# Patient Record
Sex: MALE | Race: OTHER | HISPANIC OR LATINO | URBAN - METROPOLITAN AREA
[De-identification: names, ages, dates, MRNs, and addresses within clinical notes are randomized per-mention and may not be internally consistent; named-entity substitution may affect disease eponyms.]

---

## 2021-05-09 ENCOUNTER — EMERGENCY (EMERGENCY)
Facility: HOSPITAL | Age: 26
LOS: 1 days | End: 2021-05-09
Admitting: EMERGENCY MEDICINE
Payer: SELF-PAY

## 2021-05-09 PROCEDURE — 71045 X-RAY EXAM CHEST 1 VIEW: CPT | Mod: 26

## 2021-05-09 PROCEDURE — 93010 ELECTROCARDIOGRAM REPORT: CPT

## 2021-05-09 PROCEDURE — 99285 EMERGENCY DEPT VISIT HI MDM: CPT

## 2022-08-28 ENCOUNTER — EMERGENCY (EMERGENCY)
Facility: HOSPITAL | Age: 27
LOS: 1 days | Discharge: HOME | End: 2022-08-28
Admitting: EMERGENCY MEDICINE
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
## 2022-08-28 DIAGNOSIS — S01.01XA LACERATION WITHOUT FOREIGN BODY OF SCALP, INITIAL ENCOUNTER: ICD-10-CM

## 2022-08-28 DIAGNOSIS — Z04.89 ENCOUNTER FOR EXAMINATION AND OBSERVATION FOR OTHER SPECIFIED REASONS: ICD-10-CM

## 2022-08-28 DIAGNOSIS — S21.111A LACERATION WITHOUT FOREIGN BODY OF RIGHT FRONT WALL OF THORAX WITHOUT PENETRATION INTO THORACIC CAVITY, INITIAL ENCOUNTER: ICD-10-CM

## 2022-08-28 DIAGNOSIS — S11.81XA LACERATION WITHOUT FOREIGN BODY OF OTHER SPECIFIED PART OF NECK, INITIAL ENCOUNTER: ICD-10-CM
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
## 2022-08-28 DIAGNOSIS — S09.8XXA OTHER SPECIFIED INJURIES OF HEAD, INITIAL ENCOUNTER: ICD-10-CM

## 2022-08-28 PROCEDURE — 99283 EMERGENCY DEPT VISIT LOW MDM: CPT | Mod: 25

## 2022-08-28 PROCEDURE — 12002 RPR S/N/AX/GEN/TRNK2.6-7.5CM: CPT

## 2022-08-28 PROCEDURE — 70450 CT HEAD/BRAIN W/O DYE: CPT | Mod: 26

## 2022-09-01 ENCOUNTER — EMERGENCY (EMERGENCY)
Facility: HOSPITAL | Age: 27
LOS: 1 days | Discharge: HOME | End: 2022-09-01
Admitting: EMERGENCY MEDICINE

## 2022-09-01 DIAGNOSIS — S21.111D: ICD-10-CM

## 2022-09-01 DIAGNOSIS — Z48.02 ENCOUNTER FOR REMOVAL OF SUTURES: ICD-10-CM
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
## 2022-09-01 DIAGNOSIS — S01.01XD LACERATION WITHOUT FOREIGN BODY OF SCALP, SUBSEQUENT ENCOUNTER: ICD-10-CM

## 2022-09-01 PROCEDURE — L9995: CPT

## 2024-05-06 ENCOUNTER — TRANSCRIPTION ENCOUNTER (OUTPATIENT)
Age: 29
End: 2024-05-06

## 2024-05-07 ENCOUNTER — RESULT REVIEW (OUTPATIENT)
Age: 29
End: 2024-05-07

## 2024-05-07 ENCOUNTER — INPATIENT (INPATIENT)
Facility: HOSPITAL | Age: 29
LOS: 22 days | Discharge: HOME | DRG: 536 | End: 2024-05-30
Attending: STUDENT IN AN ORGANIZED HEALTH CARE EDUCATION/TRAINING PROGRAM | Admitting: STUDENT IN AN ORGANIZED HEALTH CARE EDUCATION/TRAINING PROGRAM
Payer: COMMERCIAL

## 2024-05-07 ENCOUNTER — TRANSCRIPTION ENCOUNTER (OUTPATIENT)
Age: 29
End: 2024-05-07

## 2024-05-07 VITALS
HEART RATE: 122 BPM | DIASTOLIC BLOOD PRESSURE: 60 MMHG | TEMPERATURE: 98 F | OXYGEN SATURATION: 96 % | RESPIRATION RATE: 16 BRPM | SYSTOLIC BLOOD PRESSURE: 105 MMHG

## 2024-05-07 DIAGNOSIS — S32.9XXA FRACTURE OF UNSPECIFIED PARTS OF LUMBOSACRAL SPINE AND PELVIS, INITIAL ENCOUNTER FOR CLOSED FRACTURE: ICD-10-CM

## 2024-05-07 LAB
ABO RH CONFIRMATION: SIGNIFICANT CHANGE UP
ALBUMIN SERPL ELPH-MCNC: 2.8 G/DL — LOW (ref 3.3–5.2)
ALBUMIN SERPL ELPH-MCNC: 2.8 G/DL — LOW (ref 3.3–5.2)
ALBUMIN SERPL ELPH-MCNC: 3.2 G/DL — LOW (ref 3.3–5.2)
ALBUMIN SERPL ELPH-MCNC: 3.2 G/DL — LOW (ref 3.3–5.2)
ALP SERPL-CCNC: 43 U/L — SIGNIFICANT CHANGE UP (ref 40–120)
ALP SERPL-CCNC: 49 U/L — SIGNIFICANT CHANGE UP (ref 40–120)
ALP SERPL-CCNC: 54 U/L — SIGNIFICANT CHANGE UP (ref 40–120)
ALP SERPL-CCNC: 57 U/L — SIGNIFICANT CHANGE UP (ref 40–120)
ALT FLD-CCNC: 59 U/L — HIGH
ALT FLD-CCNC: 65 U/L — HIGH
ALT FLD-CCNC: 70 U/L — HIGH
ALT FLD-CCNC: 71 U/L — HIGH
ANION GAP SERPL CALC-SCNC: 11 MMOL/L — SIGNIFICANT CHANGE UP (ref 5–17)
ANION GAP SERPL CALC-SCNC: 12 MMOL/L — SIGNIFICANT CHANGE UP (ref 5–17)
ANION GAP SERPL CALC-SCNC: 12 MMOL/L — SIGNIFICANT CHANGE UP (ref 5–17)
ANION GAP SERPL CALC-SCNC: 13 MMOL/L — SIGNIFICANT CHANGE UP (ref 5–17)
APPEARANCE UR: CLEAR — SIGNIFICANT CHANGE UP
APTT BLD: 26 SEC — SIGNIFICANT CHANGE UP (ref 24.5–35.6)
APTT BLD: 26.2 SEC — SIGNIFICANT CHANGE UP (ref 24.5–35.6)
APTT BLD: 26.9 SEC — SIGNIFICANT CHANGE UP (ref 24.5–35.6)
APTT BLD: 27.7 SEC — SIGNIFICANT CHANGE UP (ref 24.5–35.6)
AST SERPL-CCNC: 122 U/L — HIGH
AST SERPL-CCNC: 152 U/L — HIGH
AST SERPL-CCNC: 172 U/L — HIGH
AST SERPL-CCNC: 93 U/L — HIGH
BACTERIA # UR AUTO: NEGATIVE /HPF — SIGNIFICANT CHANGE UP
BASE EXCESS BLDV CALC-SCNC: -7.5 MMOL/L — LOW (ref -2–3)
BASOPHILS # BLD AUTO: 0.02 K/UL — SIGNIFICANT CHANGE UP (ref 0–0.2)
BASOPHILS # BLD AUTO: 0.03 K/UL — SIGNIFICANT CHANGE UP (ref 0–0.2)
BASOPHILS # BLD AUTO: 0.03 K/UL — SIGNIFICANT CHANGE UP (ref 0–0.2)
BASOPHILS # BLD AUTO: 0.04 K/UL — SIGNIFICANT CHANGE UP (ref 0–0.2)
BASOPHILS NFR BLD AUTO: 0.2 % — SIGNIFICANT CHANGE UP (ref 0–2)
BASOPHILS NFR BLD AUTO: 0.3 % — SIGNIFICANT CHANGE UP (ref 0–2)
BASOPHILS NFR BLD AUTO: 0.3 % — SIGNIFICANT CHANGE UP (ref 0–2)
BASOPHILS NFR BLD AUTO: 0.5 % — SIGNIFICANT CHANGE UP (ref 0–2)
BILIRUB DIRECT SERPL-MCNC: 0.1 MG/DL — SIGNIFICANT CHANGE UP (ref 0–0.3)
BILIRUB DIRECT SERPL-MCNC: 0.1 MG/DL — SIGNIFICANT CHANGE UP (ref 0–0.3)
BILIRUB INDIRECT FLD-MCNC: 0.3 MG/DL — SIGNIFICANT CHANGE UP (ref 0.2–1)
BILIRUB INDIRECT FLD-MCNC: 0.4 MG/DL — SIGNIFICANT CHANGE UP (ref 0.2–1)
BILIRUB SERPL-MCNC: 0.4 MG/DL — SIGNIFICANT CHANGE UP (ref 0.4–2)
BILIRUB SERPL-MCNC: 0.5 MG/DL — SIGNIFICANT CHANGE UP (ref 0.4–2)
BILIRUB SERPL-MCNC: 0.5 MG/DL — SIGNIFICANT CHANGE UP (ref 0.4–2)
BILIRUB SERPL-MCNC: 0.7 MG/DL — SIGNIFICANT CHANGE UP (ref 0.4–2)
BILIRUB UR-MCNC: NEGATIVE — SIGNIFICANT CHANGE UP
BLD GP AB SCN SERPL QL: SIGNIFICANT CHANGE UP
BUN SERPL-MCNC: 11.6 MG/DL — SIGNIFICANT CHANGE UP (ref 8–20)
BUN SERPL-MCNC: 13.3 MG/DL — SIGNIFICANT CHANGE UP (ref 8–20)
BUN SERPL-MCNC: 18.5 MG/DL — SIGNIFICANT CHANGE UP (ref 8–20)
BUN SERPL-MCNC: 19.2 MG/DL — SIGNIFICANT CHANGE UP (ref 8–20)
CALCIUM SERPL-MCNC: 6.3 MG/DL — CRITICAL LOW (ref 8.4–10.5)
CALCIUM SERPL-MCNC: 7.4 MG/DL — LOW (ref 8.4–10.5)
CALCIUM SERPL-MCNC: 7.8 MG/DL — LOW (ref 8.4–10.5)
CALCIUM SERPL-MCNC: 7.8 MG/DL — LOW (ref 8.4–10.5)
CAST: 1 /LPF — SIGNIFICANT CHANGE UP (ref 0–4)
CHLORIDE SERPL-SCNC: 101 MMOL/L — SIGNIFICANT CHANGE UP (ref 96–108)
CHLORIDE SERPL-SCNC: 102 MMOL/L — SIGNIFICANT CHANGE UP (ref 96–108)
CHLORIDE SERPL-SCNC: 106 MMOL/L — SIGNIFICANT CHANGE UP (ref 96–108)
CHLORIDE SERPL-SCNC: 109 MMOL/L — HIGH (ref 96–108)
CK MB CFR SERPL CALC: 35.1 NG/ML — HIGH (ref 0–6.7)
CK MB CFR SERPL CALC: 58.8 NG/ML — HIGH (ref 0–6.7)
CK MB CFR SERPL CALC: 66.3 NG/ML — HIGH (ref 0–6.7)
CK MB CFR SERPL CALC: 77.4 NG/ML — HIGH (ref 0–6.7)
CK SERPL-CCNC: 1734 U/L — HIGH (ref 30–200)
CK SERPL-CCNC: 4030 U/L — HIGH (ref 30–200)
CK SERPL-CCNC: 7146 U/L — HIGH (ref 30–200)
CK SERPL-CCNC: 8880 U/L — HIGH (ref 30–200)
CO2 SERPL-SCNC: 18 MMOL/L — LOW (ref 22–29)
CO2 SERPL-SCNC: 19 MMOL/L — LOW (ref 22–29)
CO2 SERPL-SCNC: 19 MMOL/L — LOW (ref 22–29)
CO2 SERPL-SCNC: 21 MMOL/L — LOW (ref 22–29)
COLOR SPEC: YELLOW — SIGNIFICANT CHANGE UP
CREAT SERPL-MCNC: 0.77 MG/DL — SIGNIFICANT CHANGE UP (ref 0.5–1.3)
CREAT SERPL-MCNC: 0.79 MG/DL — SIGNIFICANT CHANGE UP (ref 0.5–1.3)
CREAT SERPL-MCNC: 0.92 MG/DL — SIGNIFICANT CHANGE UP (ref 0.5–1.3)
CREAT SERPL-MCNC: 0.93 MG/DL — SIGNIFICANT CHANGE UP (ref 0.5–1.3)
DIFF PNL FLD: ABNORMAL
EGFR: 115 ML/MIN/1.73M2 — SIGNIFICANT CHANGE UP
EGFR: 116 ML/MIN/1.73M2 — SIGNIFICANT CHANGE UP
EGFR: 124 ML/MIN/1.73M2 — SIGNIFICANT CHANGE UP
EGFR: 125 ML/MIN/1.73M2 — SIGNIFICANT CHANGE UP
EOSINOPHIL # BLD AUTO: 0 K/UL — SIGNIFICANT CHANGE UP (ref 0–0.5)
EOSINOPHIL # BLD AUTO: 0 K/UL — SIGNIFICANT CHANGE UP (ref 0–0.5)
EOSINOPHIL # BLD AUTO: 0.01 K/UL — SIGNIFICANT CHANGE UP (ref 0–0.5)
EOSINOPHIL # BLD AUTO: 0.02 K/UL — SIGNIFICANT CHANGE UP (ref 0–0.5)
EOSINOPHIL NFR BLD AUTO: 0 % — SIGNIFICANT CHANGE UP (ref 0–6)
EOSINOPHIL NFR BLD AUTO: 0 % — SIGNIFICANT CHANGE UP (ref 0–6)
EOSINOPHIL NFR BLD AUTO: 0.1 % — SIGNIFICANT CHANGE UP (ref 0–6)
EOSINOPHIL NFR BLD AUTO: 0.1 % — SIGNIFICANT CHANGE UP (ref 0–6)
ETHANOL SERPL-MCNC: <10 MG/DL — SIGNIFICANT CHANGE UP (ref 0–9)
FIBRINOGEN PPP-MCNC: 152 MG/DL — LOW (ref 200–450)
FIBRINOGEN PPP-MCNC: 387 MG/DL — SIGNIFICANT CHANGE UP (ref 200–450)
GAS PNL BLDV: SIGNIFICANT CHANGE UP
GLUCOSE BLDC GLUCOMTR-MCNC: 138 MG/DL — HIGH (ref 70–99)
GLUCOSE BLDC GLUCOMTR-MCNC: 173 MG/DL — HIGH (ref 70–99)
GLUCOSE SERPL-MCNC: 131 MG/DL — HIGH (ref 70–99)
GLUCOSE SERPL-MCNC: 158 MG/DL — HIGH (ref 70–99)
GLUCOSE SERPL-MCNC: 159 MG/DL — HIGH (ref 70–99)
GLUCOSE SERPL-MCNC: 170 MG/DL — HIGH (ref 70–99)
GLUCOSE UR QL: NEGATIVE MG/DL — SIGNIFICANT CHANGE UP
HCO3 BLDV-SCNC: 21 MMOL/L — LOW (ref 22–29)
HCT VFR BLD CALC: 22.8 % — LOW (ref 39–50)
HCT VFR BLD CALC: 27.8 % — LOW (ref 39–50)
HCT VFR BLD CALC: 35.3 % — LOW (ref 39–50)
HCT VFR BLD CALC: 35.9 % — LOW (ref 39–50)
HGB BLD-MCNC: 10 G/DL — LOW (ref 13–17)
HGB BLD-MCNC: 12.2 G/DL — LOW (ref 13–17)
HGB BLD-MCNC: 12.3 G/DL — LOW (ref 13–17)
HGB BLD-MCNC: 8.2 G/DL — LOW (ref 13–17)
IMM GRANULOCYTES NFR BLD AUTO: 0.4 % — SIGNIFICANT CHANGE UP (ref 0–0.9)
IMM GRANULOCYTES NFR BLD AUTO: 0.5 % — SIGNIFICANT CHANGE UP (ref 0–0.9)
IMM GRANULOCYTES NFR BLD AUTO: 0.5 % — SIGNIFICANT CHANGE UP (ref 0–0.9)
IMM GRANULOCYTES NFR BLD AUTO: 1.1 % — HIGH (ref 0–0.9)
INR BLD: 1.16 RATIO — SIGNIFICANT CHANGE UP (ref 0.85–1.18)
INR BLD: 1.18 RATIO — SIGNIFICANT CHANGE UP (ref 0.85–1.18)
INR BLD: 1.2 RATIO — HIGH (ref 0.85–1.18)
INR BLD: 1.22 RATIO — HIGH (ref 0.85–1.18)
KETONES UR-MCNC: NEGATIVE MG/DL — SIGNIFICANT CHANGE UP
LACTATE SERPL-SCNC: 2.5 MMOL/L — HIGH (ref 0.5–2)
LEUKOCYTE ESTERASE UR-ACNC: NEGATIVE — SIGNIFICANT CHANGE UP
LIDOCAIN IGE QN: 39 U/L — SIGNIFICANT CHANGE UP (ref 22–51)
LYMPHOCYTES # BLD AUTO: 0.71 K/UL — LOW (ref 1–3.3)
LYMPHOCYTES # BLD AUTO: 0.93 K/UL — LOW (ref 1–3.3)
LYMPHOCYTES # BLD AUTO: 1.51 K/UL — SIGNIFICANT CHANGE UP (ref 1–3.3)
LYMPHOCYTES # BLD AUTO: 10.1 % — LOW (ref 13–44)
LYMPHOCYTES # BLD AUTO: 10.2 % — LOW (ref 13–44)
LYMPHOCYTES # BLD AUTO: 16.1 % — SIGNIFICANT CHANGE UP (ref 13–44)
LYMPHOCYTES # BLD AUTO: 16.7 % — SIGNIFICANT CHANGE UP (ref 13–44)
LYMPHOCYTES # BLD AUTO: 2.44 K/UL — SIGNIFICANT CHANGE UP (ref 1–3.3)
MAGNESIUM SERPL-MCNC: 1.4 MG/DL — LOW (ref 1.6–2.6)
MAGNESIUM SERPL-MCNC: 1.4 MG/DL — LOW (ref 1.8–2.6)
MAGNESIUM SERPL-MCNC: 1.8 MG/DL — SIGNIFICANT CHANGE UP (ref 1.6–2.6)
MCHC RBC-ENTMCNC: 30.6 PG — SIGNIFICANT CHANGE UP (ref 27–34)
MCHC RBC-ENTMCNC: 30.7 PG — SIGNIFICANT CHANGE UP (ref 27–34)
MCHC RBC-ENTMCNC: 31 PG — SIGNIFICANT CHANGE UP (ref 27–34)
MCHC RBC-ENTMCNC: 31.4 PG — SIGNIFICANT CHANGE UP (ref 27–34)
MCHC RBC-ENTMCNC: 34 GM/DL — SIGNIFICANT CHANGE UP (ref 32–36)
MCHC RBC-ENTMCNC: 34.8 GM/DL — SIGNIFICANT CHANGE UP (ref 32–36)
MCHC RBC-ENTMCNC: 36 GM/DL — SIGNIFICANT CHANGE UP (ref 32–36)
MCHC RBC-ENTMCNC: 36 GM/DL — SIGNIFICANT CHANGE UP (ref 32–36)
MCV RBC AUTO: 86.1 FL — SIGNIFICANT CHANGE UP (ref 80–100)
MCV RBC AUTO: 87.4 FL — SIGNIFICANT CHANGE UP (ref 80–100)
MCV RBC AUTO: 87.8 FL — SIGNIFICANT CHANGE UP (ref 80–100)
MCV RBC AUTO: 90.2 FL — SIGNIFICANT CHANGE UP (ref 80–100)
MONOCYTES # BLD AUTO: 0.44 K/UL — SIGNIFICANT CHANGE UP (ref 0–0.9)
MONOCYTES # BLD AUTO: 0.53 K/UL — SIGNIFICANT CHANGE UP (ref 0–0.9)
MONOCYTES # BLD AUTO: 1.12 K/UL — HIGH (ref 0–0.9)
MONOCYTES # BLD AUTO: 1.47 K/UL — HIGH (ref 0–0.9)
MONOCYTES NFR BLD AUTO: 7.5 % — SIGNIFICANT CHANGE UP (ref 2–14)
MONOCYTES NFR BLD AUTO: 7.6 % — SIGNIFICANT CHANGE UP (ref 2–14)
MONOCYTES NFR BLD AUTO: 7.6 % — SIGNIFICANT CHANGE UP (ref 2–14)
MONOCYTES NFR BLD AUTO: 9.9 % — SIGNIFICANT CHANGE UP (ref 2–14)
MRSA PCR RESULT.: SIGNIFICANT CHANGE UP
NEUTROPHILS # BLD AUTO: 10.88 K/UL — HIGH (ref 1.8–7.4)
NEUTROPHILS # BLD AUTO: 11.71 K/UL — HIGH (ref 1.8–7.4)
NEUTROPHILS # BLD AUTO: 4.36 K/UL — SIGNIFICANT CHANGE UP (ref 1.8–7.4)
NEUTROPHILS # BLD AUTO: 5.76 K/UL — SIGNIFICANT CHANGE UP (ref 1.8–7.4)
NEUTROPHILS NFR BLD AUTO: 74.3 % — SIGNIFICANT CHANGE UP (ref 43–77)
NEUTROPHILS NFR BLD AUTO: 75.3 % — SIGNIFICANT CHANGE UP (ref 43–77)
NEUTROPHILS NFR BLD AUTO: 79 % — HIGH (ref 43–77)
NEUTROPHILS NFR BLD AUTO: 81.7 % — HIGH (ref 43–77)
NITRITE UR-MCNC: NEGATIVE — SIGNIFICANT CHANGE UP
PCO2 BLDV: 56 MMHG — HIGH (ref 42–55)
PH BLDV: 7.18 — CRITICAL LOW (ref 7.32–7.43)
PH UR: 6 — SIGNIFICANT CHANGE UP (ref 5–8)
PHOSPHATE SERPL-MCNC: 2.4 MG/DL — SIGNIFICANT CHANGE UP (ref 2.4–4.7)
PHOSPHATE SERPL-MCNC: 3.4 MG/DL — SIGNIFICANT CHANGE UP (ref 2.4–4.7)
PHOSPHATE SERPL-MCNC: 3.4 MG/DL — SIGNIFICANT CHANGE UP (ref 2.4–4.7)
PLATELET # BLD AUTO: 122 K/UL — LOW (ref 150–400)
PLATELET # BLD AUTO: 145 K/UL — LOW (ref 150–400)
PLATELET # BLD AUTO: 190 K/UL — SIGNIFICANT CHANGE UP (ref 150–400)
PLATELET # BLD AUTO: 202 K/UL — SIGNIFICANT CHANGE UP (ref 150–400)
PO2 BLDV: 58 MMHG — HIGH (ref 25–45)
POTASSIUM SERPL-MCNC: 2.9 MMOL/L — CRITICAL LOW (ref 3.5–5.3)
POTASSIUM SERPL-MCNC: 3.9 MMOL/L — SIGNIFICANT CHANGE UP (ref 3.5–5.3)
POTASSIUM SERPL-MCNC: 4.1 MMOL/L — SIGNIFICANT CHANGE UP (ref 3.5–5.3)
POTASSIUM SERPL-MCNC: 4.4 MMOL/L — SIGNIFICANT CHANGE UP (ref 3.5–5.3)
POTASSIUM SERPL-SCNC: 2.9 MMOL/L — CRITICAL LOW (ref 3.5–5.3)
POTASSIUM SERPL-SCNC: 3.9 MMOL/L — SIGNIFICANT CHANGE UP (ref 3.5–5.3)
POTASSIUM SERPL-SCNC: 4.1 MMOL/L — SIGNIFICANT CHANGE UP (ref 3.5–5.3)
POTASSIUM SERPL-SCNC: 4.4 MMOL/L — SIGNIFICANT CHANGE UP (ref 3.5–5.3)
PROT SERPL-MCNC: 4.2 G/DL — LOW (ref 6.6–8.7)
PROT SERPL-MCNC: 4.7 G/DL — LOW (ref 6.6–8.7)
PROT SERPL-MCNC: 4.9 G/DL — LOW (ref 6.6–8.7)
PROT SERPL-MCNC: 5.2 G/DL — LOW (ref 6.6–8.7)
PROT UR-MCNC: NEGATIVE MG/DL — SIGNIFICANT CHANGE UP
PROTHROM AB SERPL-ACNC: 12.8 SEC — SIGNIFICANT CHANGE UP (ref 9.5–13)
PROTHROM AB SERPL-ACNC: 13 SEC — SIGNIFICANT CHANGE UP (ref 9.5–13)
PROTHROM AB SERPL-ACNC: 13.2 SEC — HIGH (ref 9.5–13)
PROTHROM AB SERPL-ACNC: 13.5 SEC — HIGH (ref 9.5–13)
RBC # BLD: 2.61 M/UL — LOW (ref 4.2–5.8)
RBC # BLD: 3.23 M/UL — LOW (ref 4.2–5.8)
RBC # BLD: 3.98 M/UL — LOW (ref 4.2–5.8)
RBC # BLD: 4.02 M/UL — LOW (ref 4.2–5.8)
RBC # FLD: 12.8 % — SIGNIFICANT CHANGE UP (ref 10.3–14.5)
RBC # FLD: 13 % — SIGNIFICANT CHANGE UP (ref 10.3–14.5)
RBC # FLD: 13.6 % — SIGNIFICANT CHANGE UP (ref 10.3–14.5)
RBC # FLD: 14 % — SIGNIFICANT CHANGE UP (ref 10.3–14.5)
RBC CASTS # UR COMP ASSIST: 8 /HPF — HIGH (ref 0–4)
S AUREUS DNA NOSE QL NAA+PROBE: SIGNIFICANT CHANGE UP
SAO2 % BLDV: 88 % — SIGNIFICANT CHANGE UP
SODIUM SERPL-SCNC: 133 MMOL/L — LOW (ref 135–145)
SODIUM SERPL-SCNC: 133 MMOL/L — LOW (ref 135–145)
SODIUM SERPL-SCNC: 137 MMOL/L — SIGNIFICANT CHANGE UP (ref 135–145)
SODIUM SERPL-SCNC: 139 MMOL/L — SIGNIFICANT CHANGE UP (ref 135–145)
SP GR SPEC: >1.03 — HIGH (ref 1–1.03)
SQUAMOUS # UR AUTO: 0 /HPF — SIGNIFICANT CHANGE UP (ref 0–5)
UROBILINOGEN FLD QL: 0.2 MG/DL — SIGNIFICANT CHANGE UP (ref 0.2–1)
WBC # BLD: 14.65 K/UL — HIGH (ref 3.8–10.5)
WBC # BLD: 14.82 K/UL — HIGH (ref 3.8–10.5)
WBC # BLD: 5.79 K/UL — SIGNIFICANT CHANGE UP (ref 3.8–10.5)
WBC # BLD: 7.05 K/UL — SIGNIFICANT CHANGE UP (ref 3.8–10.5)
WBC # FLD AUTO: 14.65 K/UL — HIGH (ref 3.8–10.5)
WBC # FLD AUTO: 14.82 K/UL — HIGH (ref 3.8–10.5)
WBC # FLD AUTO: 5.79 K/UL — SIGNIFICANT CHANGE UP (ref 3.8–10.5)
WBC # FLD AUTO: 7.05 K/UL — SIGNIFICANT CHANGE UP (ref 3.8–10.5)
WBC UR QL: 1 /HPF — SIGNIFICANT CHANGE UP (ref 0–5)

## 2024-05-07 PROCEDURE — 72125 CT NECK SPINE W/O DYE: CPT | Mod: 26

## 2024-05-07 PROCEDURE — 72192 CT PELVIS W/O DYE: CPT | Mod: 26

## 2024-05-07 PROCEDURE — 76937 US GUIDE VASCULAR ACCESS: CPT | Mod: 26

## 2024-05-07 PROCEDURE — 70498 CT ANGIOGRAPHY NECK: CPT | Mod: 26

## 2024-05-07 PROCEDURE — 36247 INS CATH ABD/L-EXT ART 3RD: CPT

## 2024-05-07 PROCEDURE — 73560 X-RAY EXAM OF KNEE 1 OR 2: CPT | Mod: 26,50

## 2024-05-07 PROCEDURE — 99254 IP/OBS CNSLTJ NEW/EST MOD 60: CPT | Mod: 57,25

## 2024-05-07 PROCEDURE — 72128 CT CHEST SPINE W/O DYE: CPT | Mod: 26

## 2024-05-07 PROCEDURE — 73070 X-RAY EXAM OF ELBOW: CPT | Mod: 26,LT

## 2024-05-07 PROCEDURE — 72170 X-RAY EXAM OF PELVIS: CPT | Mod: 26

## 2024-05-07 PROCEDURE — 99223 1ST HOSP IP/OBS HIGH 75: CPT | Mod: 57

## 2024-05-07 PROCEDURE — 70450 CT HEAD/BRAIN W/O DYE: CPT | Mod: 26,59

## 2024-05-07 PROCEDURE — 71045 X-RAY EXAM CHEST 1 VIEW: CPT | Mod: 26

## 2024-05-07 PROCEDURE — 73090 X-RAY EXAM OF FOREARM: CPT | Mod: 26,LT

## 2024-05-07 PROCEDURE — 26670 TREAT HAND DISLOCATION: CPT | Mod: F4

## 2024-05-07 PROCEDURE — 36246 INS CATH ABD/L-EXT ART 2ND: CPT | Mod: 59

## 2024-05-07 PROCEDURE — 27220 TREAT HIP SOCKET FRACTURE: CPT | Mod: 50

## 2024-05-07 PROCEDURE — 73600 X-RAY EXAM OF ANKLE: CPT | Mod: 26,50

## 2024-05-07 PROCEDURE — 99285 EMERGENCY DEPT VISIT HI MDM: CPT

## 2024-05-07 PROCEDURE — 76870 US EXAM SCROTUM: CPT | Mod: 26

## 2024-05-07 PROCEDURE — 73200 CT UPPER EXTREMITY W/O DYE: CPT | Mod: 26,LT

## 2024-05-07 PROCEDURE — 88305 TISSUE EXAM BY PATHOLOGIST: CPT | Mod: 26

## 2024-05-07 PROCEDURE — 70496 CT ANGIOGRAPHY HEAD: CPT | Mod: 26

## 2024-05-07 PROCEDURE — 73130 X-RAY EXAM OF HAND: CPT | Mod: 26,LT

## 2024-05-07 PROCEDURE — 27216 TREAT PELVIC RING FRACTURE: CPT | Mod: LT

## 2024-05-07 PROCEDURE — 70450 CT HEAD/BRAIN W/O DYE: CPT | Mod: 26,59,76

## 2024-05-07 PROCEDURE — 93010 ELECTROCARDIOGRAM REPORT: CPT

## 2024-05-07 PROCEDURE — 73030 X-RAY EXAM OF SHOULDER: CPT | Mod: 26,RT,76

## 2024-05-07 PROCEDURE — 72131 CT LUMBAR SPINE W/O DYE: CPT | Mod: 26

## 2024-05-07 PROCEDURE — 54520 REMOVAL OF TESTIS: CPT | Mod: LT

## 2024-05-07 PROCEDURE — 37244 VASC EMBOLIZE/OCCLUDE BLEED: CPT

## 2024-05-07 PROCEDURE — 27217 TREAT PELVIC RING FRACTURE: CPT

## 2024-05-07 DEVICE — SCREW CORT S-T 3.5X40MM: Type: IMPLANTABLE DEVICE | Site: LEFT | Status: FUNCTIONAL

## 2024-05-07 DEVICE — IMPLANTABLE DEVICE: Type: IMPLANTABLE DEVICE | Site: LEFT | Status: FUNCTIONAL

## 2024-05-07 DEVICE — SCREW CORT S-T 3.5X34MM: Type: IMPLANTABLE DEVICE | Site: LEFT | Status: FUNCTIONAL

## 2024-05-07 DEVICE — SCREW CORT S-T 3.5X38MM: Type: IMPLANTABLE DEVICE | Site: LEFT | Status: FUNCTIONAL

## 2024-05-07 DEVICE — GUIDE WIRE 2.8 150MM CALIBRATION: Type: IMPLANTABLE DEVICE | Site: LEFT | Status: FUNCTIONAL

## 2024-05-07 DEVICE — WASHER LG SCRW 13MM: Type: IMPLANTABLE DEVICE | Site: LEFT | Status: FUNCTIONAL

## 2024-05-07 DEVICE — BONE WAX 2.5GM: Type: IMPLANTABLE DEVICE | Site: LEFT | Status: FUNCTIONAL

## 2024-05-07 DEVICE — SCREW CORT S-T 3.5X30MM: Type: IMPLANTABLE DEVICE | Site: LEFT | Status: FUNCTIONAL

## 2024-05-07 RX ORDER — LIDOCAINE 4 G/100G
2 CREAM TOPICAL EVERY 24 HOURS
Refills: 0 | Status: DISCONTINUED | OUTPATIENT
Start: 2024-05-07 | End: 2024-05-14

## 2024-05-07 RX ORDER — CHLORHEXIDINE GLUCONATE 213 G/1000ML
1 SOLUTION TOPICAL DAILY
Refills: 0 | Status: DISCONTINUED | OUTPATIENT
Start: 2024-05-07 | End: 2024-05-07

## 2024-05-07 RX ORDER — LIDOCAINE HCL 20 MG/ML
40 VIAL (ML) INJECTION ONCE
Refills: 0 | Status: DISCONTINUED | OUTPATIENT
Start: 2024-05-07 | End: 2024-05-07

## 2024-05-07 RX ORDER — CEFAZOLIN SODIUM 1 G
2000 VIAL (EA) INJECTION ONCE
Refills: 0 | Status: DISCONTINUED | OUTPATIENT
Start: 2024-05-07 | End: 2024-05-07

## 2024-05-07 RX ORDER — MAGNESIUM SULFATE 500 MG/ML
2 VIAL (ML) INJECTION
Refills: 0 | Status: COMPLETED | OUTPATIENT
Start: 2024-05-07 | End: 2024-05-07

## 2024-05-07 RX ORDER — POTASSIUM CHLORIDE 20 MEQ
10 PACKET (EA) ORAL
Refills: 0 | Status: COMPLETED | OUTPATIENT
Start: 2024-05-07 | End: 2024-05-07

## 2024-05-07 RX ORDER — CEFAZOLIN SODIUM 1 G
2000 VIAL (EA) INJECTION
Refills: 0 | Status: COMPLETED | OUTPATIENT
Start: 2024-05-07 | End: 2024-05-08

## 2024-05-07 RX ORDER — FENTANYL CITRATE 50 UG/ML
50 INJECTION INTRAVENOUS ONCE
Refills: 0 | Status: DISCONTINUED | OUTPATIENT
Start: 2024-05-07 | End: 2024-05-07

## 2024-05-07 RX ORDER — LIDOCAINE 4 G/100G
1 CREAM TOPICAL DAILY
Refills: 0 | Status: DISCONTINUED | OUTPATIENT
Start: 2024-05-07 | End: 2024-05-07

## 2024-05-07 RX ORDER — SENNA PLUS 8.6 MG/1
2 TABLET ORAL AT BEDTIME
Refills: 0 | Status: DISCONTINUED | OUTPATIENT
Start: 2024-05-07 | End: 2024-05-14

## 2024-05-07 RX ORDER — CHLORHEXIDINE GLUCONATE 213 G/1000ML
1 SOLUTION TOPICAL DAILY
Refills: 0 | Status: DISCONTINUED | OUTPATIENT
Start: 2024-05-07 | End: 2024-05-10

## 2024-05-07 RX ORDER — FENTANYL CITRATE 50 UG/ML
100 INJECTION INTRAVENOUS ONCE
Refills: 0 | Status: DISCONTINUED | OUTPATIENT
Start: 2024-05-07 | End: 2024-05-07

## 2024-05-07 RX ORDER — FENTANYL CITRATE 50 UG/ML
50 INJECTION INTRAVENOUS EVERY 4 HOURS
Refills: 0 | Status: DISCONTINUED | OUTPATIENT
Start: 2024-05-07 | End: 2024-05-07

## 2024-05-07 RX ORDER — POVIDONE-IODINE 5 %
1 AEROSOL (ML) TOPICAL ONCE
Refills: 0 | Status: DISCONTINUED | OUTPATIENT
Start: 2024-05-07 | End: 2024-05-07

## 2024-05-07 RX ORDER — CHLORHEXIDINE GLUCONATE 213 G/1000ML
1 SOLUTION TOPICAL
Refills: 0 | Status: DISCONTINUED | OUTPATIENT
Start: 2024-05-07 | End: 2024-05-07

## 2024-05-07 RX ORDER — SODIUM CHLORIDE 9 MG/ML
1000 INJECTION, SOLUTION INTRAVENOUS
Refills: 0 | Status: DISCONTINUED | OUTPATIENT
Start: 2024-05-07 | End: 2024-05-09

## 2024-05-07 RX ORDER — SODIUM CHLORIDE 9 MG/ML
1000 INJECTION, SOLUTION INTRAVENOUS ONCE
Refills: 0 | Status: COMPLETED | OUTPATIENT
Start: 2024-05-07 | End: 2024-05-07

## 2024-05-07 RX ORDER — ONDANSETRON 8 MG/1
4 TABLET, FILM COATED ORAL ONCE
Refills: 0 | Status: COMPLETED | OUTPATIENT
Start: 2024-05-07 | End: 2024-05-07

## 2024-05-07 RX ORDER — HYDROMORPHONE HYDROCHLORIDE 2 MG/ML
0.2 INJECTION INTRAMUSCULAR; INTRAVENOUS; SUBCUTANEOUS EVERY 4 HOURS
Refills: 0 | Status: DISCONTINUED | OUTPATIENT
Start: 2024-05-07 | End: 2024-05-07

## 2024-05-07 RX ORDER — ACETAMINOPHEN 500 MG
1000 TABLET ORAL ONCE
Refills: 0 | Status: COMPLETED | OUTPATIENT
Start: 2024-05-07 | End: 2024-05-07

## 2024-05-07 RX ORDER — CEFAZOLIN SODIUM 1 G
2000 VIAL (EA) INJECTION ONCE
Refills: 0 | Status: COMPLETED | OUTPATIENT
Start: 2024-05-07 | End: 2024-05-07

## 2024-05-07 RX ORDER — LEVETIRACETAM 250 MG/1
500 TABLET, FILM COATED ORAL EVERY 12 HOURS
Refills: 0 | Status: DISCONTINUED | OUTPATIENT
Start: 2024-05-07 | End: 2024-05-07

## 2024-05-07 RX ORDER — SODIUM CHLORIDE 9 MG/ML
1000 INJECTION, SOLUTION INTRAVENOUS
Refills: 0 | Status: DISCONTINUED | OUTPATIENT
Start: 2024-05-07 | End: 2024-05-07

## 2024-05-07 RX ORDER — OXYCODONE HYDROCHLORIDE 5 MG/1
5 TABLET ORAL EVERY 4 HOURS
Refills: 0 | Status: DISCONTINUED | OUTPATIENT
Start: 2024-05-07 | End: 2024-05-08

## 2024-05-07 RX ORDER — ENOXAPARIN SODIUM 100 MG/ML
40 INJECTION SUBCUTANEOUS EVERY 12 HOURS
Refills: 0 | Status: DISCONTINUED | OUTPATIENT
Start: 2024-05-07 | End: 2024-05-08

## 2024-05-07 RX ORDER — CALCIUM GLUCONATE 100 MG/ML
2 VIAL (ML) INTRAVENOUS
Refills: 0 | Status: COMPLETED | OUTPATIENT
Start: 2024-05-07 | End: 2024-05-07

## 2024-05-07 RX ORDER — MUPIROCIN 20 MG/G
1 OINTMENT TOPICAL
Refills: 0 | Status: DISCONTINUED | OUTPATIENT
Start: 2024-05-07 | End: 2024-05-07

## 2024-05-07 RX ORDER — ACETAMINOPHEN 500 MG
650 TABLET ORAL EVERY 6 HOURS
Refills: 0 | Status: DISCONTINUED | OUTPATIENT
Start: 2024-05-07 | End: 2024-05-14

## 2024-05-07 RX ORDER — SODIUM CHLORIDE 9 MG/ML
250 INJECTION INTRAMUSCULAR; INTRAVENOUS; SUBCUTANEOUS ONCE
Refills: 0 | Status: DISCONTINUED | OUTPATIENT
Start: 2024-05-07 | End: 2024-05-07

## 2024-05-07 RX ORDER — TETANUS TOXOID, REDUCED DIPHTHERIA TOXOID AND ACELLULAR PERTUSSIS VACCINE, ADSORBED 5; 2.5; 8; 8; 2.5 [IU]/.5ML; [IU]/.5ML; UG/.5ML; UG/.5ML; UG/.5ML
0.5 SUSPENSION INTRAMUSCULAR ONCE
Refills: 0 | Status: COMPLETED | OUTPATIENT
Start: 2024-05-07 | End: 2024-05-07

## 2024-05-07 RX ORDER — VANCOMYCIN HCL 1 G
1000 VIAL (EA) INTRAVENOUS ONCE
Refills: 0 | Status: DISCONTINUED | OUTPATIENT
Start: 2024-05-07 | End: 2024-05-07

## 2024-05-07 RX ORDER — HYDROMORPHONE HYDROCHLORIDE 2 MG/ML
0.5 INJECTION INTRAMUSCULAR; INTRAVENOUS; SUBCUTANEOUS EVERY 4 HOURS
Refills: 0 | Status: DISCONTINUED | OUTPATIENT
Start: 2024-05-07 | End: 2024-05-07

## 2024-05-07 RX ORDER — OXYCODONE HYDROCHLORIDE 5 MG/1
10 TABLET ORAL EVERY 4 HOURS
Refills: 0 | Status: DISCONTINUED | OUTPATIENT
Start: 2024-05-07 | End: 2024-05-08

## 2024-05-07 RX ADMIN — Medication 25 GRAM(S): at 13:13

## 2024-05-07 RX ADMIN — TETANUS TOXOID, REDUCED DIPHTHERIA TOXOID AND ACELLULAR PERTUSSIS VACCINE, ADSORBED 0.5 MILLILITER(S): 5; 2.5; 8; 8; 2.5 SUSPENSION INTRAMUSCULAR at 02:28

## 2024-05-07 RX ADMIN — Medication 25 GRAM(S): at 05:03

## 2024-05-07 RX ADMIN — HYDROMORPHONE HYDROCHLORIDE 0.5 MILLIGRAM(S): 2 INJECTION INTRAMUSCULAR; INTRAVENOUS; SUBCUTANEOUS at 13:13

## 2024-05-07 RX ADMIN — SODIUM CHLORIDE 120 MILLILITER(S): 9 INJECTION, SOLUTION INTRAVENOUS at 02:28

## 2024-05-07 RX ADMIN — FENTANYL CITRATE 50 MICROGRAM(S): 50 INJECTION INTRAVENOUS at 03:15

## 2024-05-07 RX ADMIN — HYDROMORPHONE HYDROCHLORIDE 0.5 MILLIGRAM(S): 2 INJECTION INTRAMUSCULAR; INTRAVENOUS; SUBCUTANEOUS at 13:30

## 2024-05-07 RX ADMIN — FENTANYL CITRATE 100 MICROGRAM(S): 50 INJECTION INTRAVENOUS at 04:38

## 2024-05-07 RX ADMIN — Medication 100 MILLIEQUIVALENT(S): at 04:21

## 2024-05-07 RX ADMIN — CHLORHEXIDINE GLUCONATE 1 APPLICATION(S): 213 SOLUTION TOPICAL at 05:04

## 2024-05-07 RX ADMIN — Medication 200 GRAM(S): at 02:51

## 2024-05-07 RX ADMIN — MUPIROCIN 1 APPLICATION(S): 20 OINTMENT TOPICAL at 05:03

## 2024-05-07 RX ADMIN — FENTANYL CITRATE 50 MICROGRAM(S): 50 INJECTION INTRAVENOUS at 07:04

## 2024-05-07 RX ADMIN — FENTANYL CITRATE 50 MICROGRAM(S): 50 INJECTION INTRAVENOUS at 03:02

## 2024-05-07 RX ADMIN — FENTANYL CITRATE 100 MICROGRAM(S): 50 INJECTION INTRAVENOUS at 07:04

## 2024-05-07 RX ADMIN — SODIUM CHLORIDE 150 MILLILITER(S): 9 INJECTION, SOLUTION INTRAVENOUS at 21:00

## 2024-05-07 RX ADMIN — Medication 100 MILLIEQUIVALENT(S): at 02:51

## 2024-05-07 RX ADMIN — ONDANSETRON 4 MILLIGRAM(S): 8 TABLET, FILM COATED ORAL at 02:30

## 2024-05-07 RX ADMIN — CHLORHEXIDINE GLUCONATE 1 APPLICATION(S): 213 SOLUTION TOPICAL at 13:14

## 2024-05-07 RX ADMIN — SODIUM CHLORIDE 1000 MILLILITER(S): 9 INJECTION, SOLUTION INTRAVENOUS at 13:13

## 2024-05-07 RX ADMIN — Medication 2000 MILLIGRAM(S): at 00:00

## 2024-05-07 RX ADMIN — Medication 1000 MILLIGRAM(S): at 03:00

## 2024-05-07 RX ADMIN — Medication 100 MILLIEQUIVALENT(S): at 03:15

## 2024-05-07 RX ADMIN — Medication 400 MILLIGRAM(S): at 02:12

## 2024-05-07 RX ADMIN — Medication 200 GRAM(S): at 04:45

## 2024-05-07 RX ADMIN — FENTANYL CITRATE 50 MICROGRAM(S): 50 INJECTION INTRAVENOUS at 06:17
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
## 2024-05-07 NOTE — PROGRESS NOTE ADULT - SUBJECTIVE AND OBJECTIVE BOX
IR Post Procedure Note    Diagnosis: Trauma    Procedure: Pelvic angio and embo    : Genaro Kelly MD    Contrast: 200cc    Anesthesia: 1% Lidocaine Subcutaneous, Sedation administered by Anesthesiology    Estimated Blood Loss: Less than 10cc    Complications: No Immediate Complications    Findings & Plan: R CFA Access, Aortogram shows possible vessel injury in R pelvis however no PSA or active extrav. Unable to navigate to distal vessel so embo performed w gelfoam. R Anterior iliac embo performed w gelfoam. L anterior and posterior division embo performed w gelfoam. Binder was then removed. Abrupted L pelvic vessel identified and shown to be transected and was embo w coils. No further active extrav. Closure w angioseal      Please call Interventional Radiology with any questions, concerns, or issues.
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
## 2024-05-07 NOTE — BRIEF OPERATIVE NOTE - NSICDXBRIEFPREOP_GEN_ALL_CORE_FT
PRE-OP DIAGNOSIS:  Multiple fractures of pelvis with disruption of pelvic ring 07-May-2024 19:49:44  Huber Esqueda
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
## 2024-05-07 NOTE — PROGRESS NOTE ADULT - SUBJECTIVE AND OBJECTIVE BOX
Attempted to see patient but patient in IR embolization procedure. Possible pelvis ORIF later today pending SICU optimization for procedure.
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
## 2024-05-07 NOTE — PROCEDURE NOTE - ADDITIONAL PROCEDURE DETAILS
Staples to forehead were removed. Running chromic subcutaneous and 5-0 Prolene interrupted placed.   Patient has previous staples to scalp that were left intact.
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
## 2024-05-07 NOTE — ED ADULT TRIAGE NOTE - MEANS OF ARRIVAL
stretcher
DO NOT take any Tylenol (Acetaminophen) or narcotics containing Tylenol until after 5:15pm . You received Tylenol during your operation and it can cause damage to your liver if too much is taken within a 24 hour time period.

## 2024-05-07 NOTE — ED PROVIDER NOTE - CONSTITUTIONAL NEGATIVE STATEMENT, MLM
Surgery    No complaints. Afebrile, VSS. Alert. Left arm dressing dry, hand warm. Glucose around 90 overnight. I will decrease dose on 75/25 for this AM, resume usual doses at home. He reports glucose running in low 100's at home. O2 saturation now over 90% on RA. For HD today then discharge.     Emelia Hernandez MD no fever and no chills.

## 2024-05-07 NOTE — ED PROVIDER NOTE - CLINICAL SUMMARY MEDICAL DECISION MAKING FREE TEXT BOX
hemodynamically stable pain control trauma team recommendations Ortho record that Ortho recommendations implemented admitted to SICU for further care

## 2024-05-07 NOTE — PROGRESS NOTE ADULT - SUBJECTIVE AND OBJECTIVE BOX
SUBJECTIVE: Patient seen and examined post IR procedure. Patient received intraprocedural sedation and is arousable complaining of pelvic pain. On assessment patient endorsing photophobia and decreased visual acuity bilaterally. Per SICU team the patient has been endorsing dec visual acuity since arrival this AM.     Vital Signs Last 24 Hrs  T(C): 36.7 (05-07-24 @ 11:25), Max: 37.6 (05-07-24 @ 11:01)  T(F): 98.1 (05-07-24 @ 11:25), Max: 99.6 (05-07-24 @ 11:01)  HR: 106 (05-07-24 @ 14:00) (99 - 122)  BP: 115/83 (05-07-24 @ 14:00) (93/59 - 141/82)  BP(mean): 94 (05-07-24 @ 14:00) (70 - 99)  RR: 16 (05-07-24 @ 14:00) (11 - 19)  SpO2: 98% (05-07-24 @ 14:00) (96% - 100%)    EXAM:  Constitutional: Patient laying supine, with C-Collar in place.    Neurological: Arousable, EOV, Oriented x3 (person, place and time), pupils 4mm briskly reactive b/l, dec visual acuity b/l however blinks to threat b/l, EOMI, Motor exam: RUE antigravity, LUE immobilized, RLE immobilized 2/2 IR procedure, distally 5/5, LLE externally rotated distally 5/5.     LABS:             10.0   5.79  )-----------( 145      ( 07 May 2024 11:46 )             27.8    05-07  133<L>  |  102  |  13.3  ----------------------------<  159<H>  4.4   |  19.0<L>  |  0.77  Ca    7.8<L>      07 May 2024 11:46  Phos  3.4     05-07  Mg     1.4     05-07  TPro  5.2<L>  /  Alb  3.2<L>  /  TBili  0.5  /  DBili  0.1  /  AST  152<H>  /  ALT  71<H>  /  AlkPhos  49  05-07    PT/INR - ( 07 May 2024 11:46 )   PT: 12.8 sec;   INR: 1.16 ratio    PTT - ( 07 May 2024 11:46 )  PTT:27.7 sec    05-06 @ 07:01  -  05-07 @ 07:00  --------------------------------------------------------  IN: 1470 mL / OUT: 375 mL / NET: 1095 mL    05-07 @ 07:01 - 05-07 @ 14:36  --------------------------------------------------------  IN: 2142 mL / OUT: 830 mL / NET: 1312 mL    IMAGING:   < from: CT Head No Cont (05.07.24 @ 13:55) >  ACC: [id number] EXAM:  CT BRAIN   ORDERED BY: LI RODRIGUEZ   PROCEDURE DATE:  05/07/2024    INTERPRETATION:  CLINICAL INDICATION: Head trauma, follow-up  5mm axial sections of the brain were obtained from base to vertex,   without theintravenous administration of contrast material. Coronal and   sagittal computer generated reconstructed views are available.    Comparison is made with the prior CT of 6:46 AM.    There is increased density in the posterior parafalcine region consistent   with small amount of subdural hemorrhage which also extends along the   right tentorium. There are small areas of petechial hemorrhage in the   left parietal parenchyma and adjacent subarachnoid space. There is also a   small amount of subarachnoid hemorrhage in the interpeduncular cistern   which appears slightly more prominent compared to the previous exam.   There is no midline shift or hydrocephalus. There is no evidence of   herniation.    IMPRESSION: Subarachnoid hemorrhage in the interpeduncular cistern   slightly more prominent compared with 6:46 AM. Posterior parafalcine   subdural hemorrhage extending along the tentorium. No change in   parenchymal or subarachnoid hemorrhage left parietal cortex.  --- End of Report ---  ALBANIA ALY MD; Attending Radiologist  This document has been electronically signed. May  7 2024  2:05PM  < end of copied text >    MEDICATIONS  (STANDING):  ceFAZolin  Injectable. 2000 milliGRAM(s) IV Push once  chlorhexidine 2% Cloths 1 Application(s) Topical <User Schedule>  chlorhexidine 2% Cloths 1 Application(s) Topical daily  levETIRAcetam   Injectable 500 milliGRAM(s) IV Push every 12 hours  lidocaine   4% Patch 1 Patch Transdermal daily  multiple electrolytes Injection Type 1 1000 milliLiter(s) (120 mL/Hr) IV Continuous <Continuous>    MEDICATIONS  (PRN):  HYDROmorphone  Injectable 0.2 milliGRAM(s) IV Push every 4 hours PRN Moderate Pain (4 - 6)  HYDROmorphone  Injectable 0.5 milliGRAM(s) IV Push every 4 hours PRN Severe Pain (7 - 10)    DIET: NPO
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
## 2024-05-07 NOTE — BRIEF OPERATIVE NOTE - COMMENTS
post-op plan: WBAT RLE, TTWB LLE x 6 weeks, PT/OT, ancef per protocol, BLE SCD, LMWH (or equivalent) x 4 weeks post-op, hemovac in space of retzius, keep in place until <60/shift or <100/day,

## 2024-05-07 NOTE — ED PROVIDER NOTE - CARE PLAN
1 Principal Discharge DX:	Pelvis fracture  Secondary Diagnosis:	Open skull fracture  Secondary Diagnosis:	Dislocation of left elbow

## 2024-05-07 NOTE — PROGRESS NOTE ADULT - SUBJECTIVE AND OBJECTIVE BOX
ORTHO-POST-OP PROGRESS NOTE:      [id number]    [facility]IT[facility]      PROCEDURE: ORIF symphysis pubis, Percutaneous pinning of sacrum    DOS: 5/7/24      SUBJECTIVE: 29y Patient seen and examined. POD#0, Patient reports of moderate discomfort that is controlled by pain medications. C collar in place, LUE splinted,  Patient denies of acute sensory or motor changes.                           8.2    7.05  )-----------( 122      ( 07 May 2024 21:00 )             22.8     I&O's Detail    06 May 2024 07:01  -  07 May 2024 07:00  --------------------------------------------------------  IN:    Frozen Plasma Cryoprecipitate Reduced: 130 mL    IV PiggyBack: 100 mL    IV PiggyBack: 20 mL    IV PiggyBack: 200 mL    IV PiggyBack: 300 mL    multiple electrolytes Injection Type 1.: 720 mL  Total IN: 1470 mL    OUT:    Voided (mL): 375 mL  Total OUT: 375 mL    Total NET: 1095 mL      07 May 2024 07:01  -  07 May 2024 22:29  --------------------------------------------------------  IN:    Cryoprecipitate: 252 mL    IV PiggyBack: 50 mL    multiple electrolytes Injection Type 1 Bolus: 1000 mL    multiple electrolytes Injection Type 1.: 1080 mL    multiple electrolytes Injection Type 1.: 150 mL  Total IN: 2532 mL    OUT:    Indwelling Catheter - Urethral (mL): 955 mL  Total OUT: 955 mL    Total NET: 1577 mL    acetaminophen     Tablet .. 650 milliGRAM(s) Oral every 6 hours  chlorhexidine 2% Cloths 1 Application(s) Topical daily  enoxaparin Injectable 40 milliGRAM(s) SubCutaneous every 12 hours  lidocaine   4% Patch 2 Patch Transdermal every 24 hours  multiple electrolytes Injection Type 1 1000 milliLiter(s) IV Continuous <Continuous>  oxyCODONE    IR 10 milliGRAM(s) Oral every 4 hours PRN  oxyCODONE    IR 5 milliGRAM(s) Oral every 4 hours PRN  senna 2 Tablet(s) Oral at bedtime    T(C): 37.6 (05-07-24 @ 22:00), Max: 37.7 (05-07-24 @ 20:50)  HR: 102 (05-07-24 @ 22:00) (99 - 129)  BP: 128/82 (05-07-24 @ 16:00) (93/59 - 141/82)  RR: 14 (05-07-24 @ 22:00) (11 - 19)  SpO2: 99% (05-07-24 @ 22:00) (96% - 100%)  Wt(kg): --      PHYSICAL EXAM:     Constitutional: Alert, responsive, in no acute distress. C collar in place     Injured Extremity:          Dressing: Lower abdominal / Upper pevlic region dressing Clean/dry/intact. + HV x1 intact and functioning well. No active bleeding or discharge noted.            Sensation: normal to light touch. to LUE and b/l LE               Motor exam: LE exam: Ankle DF/PF intact b/l, EHL/FHL intact b/l, compartments soft and compressible b/l, SILT to b/l LE, + palpable DP pulse b/l             LUE: splint inplace,, + edema to Left hand, SILT to all digits left hand, unable to test elbow, wrist flexion/ extension 2/2 to sugar tong splint, patient able to wiggle all fingers, Cap refil <2sec to all digits left hand                                                Vascular:  + warm well perfused; capillary refill <3 seconds                                                       A/P :   Male S/P ORIF symphysis pubis, Percutaneous pinning of sacrum  POD# 0    -  Pain control  -  DVT ppx: [x]SCDs   [x] Pharmacolgic    -  Weight bearing status: WBAT RLE, TTWB LLE x 6 weeks,   - NWB to LUE, maintain splint, recs per ortho hand  -  ABX per protocol   - hemovac in space of retzius monitor output, keep in place until <60/shift or <100/day  - Remaining care per SICU team

## 2024-05-07 NOTE — CONSULT NOTE ADULT - NS ATTEND AMEND GEN_ALL_CORE FT
Agree w above.
Orthopaedic Trauma Surgeon Addendum:    I have personally performed a face-to-face diagnostic evaluation on this patient.  I have reviewed the physician assistant note and agree with the history, exam, and plan of care, except as noted.    Orthopedic Surgery is ready to proceed with surgery pending medical optimization and adequate Operating Room availability. Risks of surgical delay discussed with other providers and staff. Please call with any questions or concerns.     Huber Esqueda MD  Orthopaedic Trauma Surgeon  [facility] [place]
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
## 2024-05-08 LAB
ALBUMIN SERPL ELPH-MCNC: 2.6 G/DL — LOW (ref 3.3–5.2)
ALP SERPL-CCNC: 44 U/L — SIGNIFICANT CHANGE UP (ref 40–120)
ALT FLD-CCNC: 63 U/L — HIGH
ANION GAP SERPL CALC-SCNC: 11 MMOL/L — SIGNIFICANT CHANGE UP (ref 5–17)
ANION GAP SERPL CALC-SCNC: 8 MMOL/L — SIGNIFICANT CHANGE UP (ref 5–17)
ANISOCYTOSIS BLD QL: SLIGHT — SIGNIFICANT CHANGE UP
AST SERPL-CCNC: 159 U/L — HIGH
BASOPHILS # BLD AUTO: 0.02 K/UL — SIGNIFICANT CHANGE UP (ref 0–0.2)
BASOPHILS NFR BLD AUTO: 0.3 % — SIGNIFICANT CHANGE UP (ref 0–2)
BILIRUB DIRECT SERPL-MCNC: 0.1 MG/DL — SIGNIFICANT CHANGE UP (ref 0–0.3)
BILIRUB INDIRECT FLD-MCNC: 0.3 MG/DL — SIGNIFICANT CHANGE UP (ref 0.2–1)
BILIRUB SERPL-MCNC: 0.4 MG/DL — SIGNIFICANT CHANGE UP (ref 0.4–2)
BUN SERPL-MCNC: 10.6 MG/DL — SIGNIFICANT CHANGE UP (ref 8–20)
BUN SERPL-MCNC: 8.4 MG/DL — SIGNIFICANT CHANGE UP (ref 8–20)
BURR CELLS BLD QL SMEAR: PRESENT — SIGNIFICANT CHANGE UP
CALCIUM SERPL-MCNC: 7.3 MG/DL — LOW (ref 8.4–10.5)
CALCIUM SERPL-MCNC: 7.3 MG/DL — LOW (ref 8.4–10.5)
CHLORIDE SERPL-SCNC: 100 MMOL/L — SIGNIFICANT CHANGE UP (ref 96–108)
CHLORIDE SERPL-SCNC: 100 MMOL/L — SIGNIFICANT CHANGE UP (ref 96–108)
CK MB CFR SERPL CALC: 41.3 NG/ML — HIGH (ref 0–6.7)
CK MB CFR SERPL CALC: 56.3 NG/ML — HIGH (ref 0–6.7)
CK SERPL-CCNC: 7396 U/L — HIGH (ref 30–200)
CK SERPL-CCNC: 7940 U/L — HIGH (ref 30–200)
CO2 SERPL-SCNC: 23 MMOL/L — SIGNIFICANT CHANGE UP (ref 22–29)
CO2 SERPL-SCNC: 25 MMOL/L — SIGNIFICANT CHANGE UP (ref 22–29)
CREAT SERPL-MCNC: 0.59 MG/DL — SIGNIFICANT CHANGE UP (ref 0.5–1.3)
CREAT SERPL-MCNC: 0.64 MG/DL — SIGNIFICANT CHANGE UP (ref 0.5–1.3)
EGFR: 132 ML/MIN/1.73M2 — SIGNIFICANT CHANGE UP
EGFR: 136 ML/MIN/1.73M2 — SIGNIFICANT CHANGE UP
EOSINOPHIL # BLD AUTO: 0 K/UL — SIGNIFICANT CHANGE UP (ref 0–0.5)
EOSINOPHIL NFR BLD AUTO: 0 % — SIGNIFICANT CHANGE UP (ref 0–6)
GLUCOSE BLDC GLUCOMTR-MCNC: 132 MG/DL — HIGH (ref 70–99)
GLUCOSE BLDC GLUCOMTR-MCNC: 139 MG/DL — HIGH (ref 70–99)
GLUCOSE BLDC GLUCOMTR-MCNC: 155 MG/DL — HIGH (ref 70–99)
GLUCOSE SERPL-MCNC: 138 MG/DL — HIGH (ref 70–99)
GLUCOSE SERPL-MCNC: 152 MG/DL — HIGH (ref 70–99)
HCT VFR BLD CALC: 19.3 % — CRITICAL LOW (ref 39–50)
HCT VFR BLD CALC: 21.2 % — LOW (ref 39–50)
HCT VFR BLD CALC: 22.8 % — LOW (ref 39–50)
HGB BLD-MCNC: 7 G/DL — CRITICAL LOW (ref 13–17)
HGB BLD-MCNC: 7.4 G/DL — LOW (ref 13–17)
HGB BLD-MCNC: 8.1 G/DL — LOW (ref 13–17)
IMM GRANULOCYTES NFR BLD AUTO: 0.5 % — SIGNIFICANT CHANGE UP (ref 0–0.9)
LYMPHOCYTES # BLD AUTO: 1.12 K/UL — SIGNIFICANT CHANGE UP (ref 1–3.3)
LYMPHOCYTES # BLD AUTO: 14.4 % — SIGNIFICANT CHANGE UP (ref 13–44)
MAGNESIUM SERPL-MCNC: 1.9 MG/DL — SIGNIFICANT CHANGE UP (ref 1.6–2.6)
MAGNESIUM SERPL-MCNC: 2 MG/DL — SIGNIFICANT CHANGE UP (ref 1.6–2.6)
MANUAL SMEAR VERIFICATION: SIGNIFICANT CHANGE UP
MCHC RBC-ENTMCNC: 30.5 PG — SIGNIFICANT CHANGE UP (ref 27–34)
MCHC RBC-ENTMCNC: 31.2 PG — SIGNIFICANT CHANGE UP (ref 27–34)
MCHC RBC-ENTMCNC: 31.7 PG — SIGNIFICANT CHANGE UP (ref 27–34)
MCHC RBC-ENTMCNC: 34.9 GM/DL — SIGNIFICANT CHANGE UP (ref 32–36)
MCHC RBC-ENTMCNC: 35.5 GM/DL — SIGNIFICANT CHANGE UP (ref 32–36)
MCHC RBC-ENTMCNC: 36.3 GM/DL — HIGH (ref 32–36)
MCV RBC AUTO: 87.2 FL — SIGNIFICANT CHANGE UP (ref 80–100)
MCV RBC AUTO: 87.3 FL — SIGNIFICANT CHANGE UP (ref 80–100)
MCV RBC AUTO: 87.7 FL — SIGNIFICANT CHANGE UP (ref 80–100)
MICROCYTES BLD QL: SLIGHT — SIGNIFICANT CHANGE UP
MONOCYTES # BLD AUTO: 0.82 K/UL — SIGNIFICANT CHANGE UP (ref 0–0.9)
MONOCYTES NFR BLD AUTO: 10.5 % — SIGNIFICANT CHANGE UP (ref 2–14)
NEUTROPHILS # BLD AUTO: 5.79 K/UL — SIGNIFICANT CHANGE UP (ref 1.8–7.4)
NEUTROPHILS NFR BLD AUTO: 74.3 % — SIGNIFICANT CHANGE UP (ref 43–77)
PHOSPHATE SERPL-MCNC: 2.2 MG/DL — LOW (ref 2.4–4.7)
PHOSPHATE SERPL-MCNC: 2.5 MG/DL — SIGNIFICANT CHANGE UP (ref 2.4–4.7)
PLAT MORPH BLD: NORMAL — SIGNIFICANT CHANGE UP
PLATELET # BLD AUTO: 108 K/UL — LOW (ref 150–400)
PLATELET # BLD AUTO: 120 K/UL — LOW (ref 150–400)
PLATELET # BLD AUTO: 98 K/UL — LOW (ref 150–400)
POIKILOCYTOSIS BLD QL AUTO: SLIGHT — SIGNIFICANT CHANGE UP
POLYCHROMASIA BLD QL SMEAR: SIGNIFICANT CHANGE UP
POTASSIUM SERPL-MCNC: 3.7 MMOL/L — SIGNIFICANT CHANGE UP (ref 3.5–5.3)
POTASSIUM SERPL-MCNC: 4.1 MMOL/L — SIGNIFICANT CHANGE UP (ref 3.5–5.3)
POTASSIUM SERPL-SCNC: 3.7 MMOL/L — SIGNIFICANT CHANGE UP (ref 3.5–5.3)
POTASSIUM SERPL-SCNC: 4.1 MMOL/L — SIGNIFICANT CHANGE UP (ref 3.5–5.3)
PROT SERPL-MCNC: 4.8 G/DL — LOW (ref 6.6–8.7)
RBC # BLD: 2.21 M/UL — LOW (ref 4.2–5.8)
RBC # BLD: 2.43 M/UL — LOW (ref 4.2–5.8)
RBC # BLD: 2.6 M/UL — LOW (ref 4.2–5.8)
RBC # FLD: 13.5 % — SIGNIFICANT CHANGE UP (ref 10.3–14.5)
RBC # FLD: 13.7 % — SIGNIFICANT CHANGE UP (ref 10.3–14.5)
RBC # FLD: 13.8 % — SIGNIFICANT CHANGE UP (ref 10.3–14.5)
RBC BLD AUTO: ABNORMAL
SODIUM SERPL-SCNC: 133 MMOL/L — LOW (ref 135–145)
SODIUM SERPL-SCNC: 134 MMOL/L — LOW (ref 135–145)
WBC # BLD: 6.75 K/UL — SIGNIFICANT CHANGE UP (ref 3.8–10.5)
WBC # BLD: 7.25 K/UL — SIGNIFICANT CHANGE UP (ref 3.8–10.5)
WBC # BLD: 7.79 K/UL — SIGNIFICANT CHANGE UP (ref 3.8–10.5)
WBC # FLD AUTO: 6.75 K/UL — SIGNIFICANT CHANGE UP (ref 3.8–10.5)
WBC # FLD AUTO: 7.25 K/UL — SIGNIFICANT CHANGE UP (ref 3.8–10.5)
WBC # FLD AUTO: 7.79 K/UL — SIGNIFICANT CHANGE UP (ref 3.8–10.5)

## 2024-05-08 PROCEDURE — 73200 CT UPPER EXTREMITY W/O DYE: CPT | Mod: 26,LT,76

## 2024-05-08 PROCEDURE — 73080 X-RAY EXAM OF ELBOW: CPT | Mod: 26,LT

## 2024-05-08 PROCEDURE — 73130 X-RAY EXAM OF HAND: CPT | Mod: 26,LT,77

## 2024-05-08 PROCEDURE — 99291 CRITICAL CARE FIRST HOUR: CPT

## 2024-05-08 PROCEDURE — 99233 SBSQ HOSP IP/OBS HIGH 50: CPT | Mod: 24

## 2024-05-08 PROCEDURE — 71045 X-RAY EXAM CHEST 1 VIEW: CPT | Mod: 26

## 2024-05-08 PROCEDURE — 99223 1ST HOSP IP/OBS HIGH 75: CPT

## 2024-05-08 PROCEDURE — 73130 X-RAY EXAM OF HAND: CPT | Mod: 26,RT

## 2024-05-08 RX ORDER — OXYCODONE HYDROCHLORIDE 5 MG/1
5 TABLET ORAL EVERY 4 HOURS
Refills: 0 | Status: DISCONTINUED | OUTPATIENT
Start: 2024-05-08 | End: 2024-05-14

## 2024-05-08 RX ORDER — ENOXAPARIN SODIUM 100 MG/ML
40 INJECTION SUBCUTANEOUS EVERY 12 HOURS
Refills: 0 | Status: DISCONTINUED | OUTPATIENT
Start: 2024-05-08 | End: 2024-05-09

## 2024-05-08 RX ORDER — LEVETIRACETAM 250 MG/1
500 TABLET, FILM COATED ORAL EVERY 12 HOURS
Refills: 0 | Status: DISCONTINUED | OUTPATIENT
Start: 2024-05-08 | End: 2024-05-08

## 2024-05-08 RX ORDER — LEVETIRACETAM 250 MG/1
500 TABLET, FILM COATED ORAL
Refills: 0 | Status: COMPLETED | OUTPATIENT
Start: 2024-05-08 | End: 2024-05-14

## 2024-05-08 RX ORDER — HYDROMORPHONE HYDROCHLORIDE 2 MG/ML
0.5 INJECTION INTRAMUSCULAR; INTRAVENOUS; SUBCUTANEOUS EVERY 4 HOURS
Refills: 0 | Status: DISCONTINUED | OUTPATIENT
Start: 2024-05-08 | End: 2024-05-14

## 2024-05-08 RX ORDER — OXYCODONE HYDROCHLORIDE 5 MG/1
10 TABLET ORAL EVERY 4 HOURS
Refills: 0 | Status: DISCONTINUED | OUTPATIENT
Start: 2024-05-08 | End: 2024-05-14

## 2024-05-08 RX ORDER — HYDROMORPHONE HYDROCHLORIDE 2 MG/ML
1 INJECTION INTRAMUSCULAR; INTRAVENOUS; SUBCUTANEOUS EVERY 4 HOURS
Refills: 0 | Status: DISCONTINUED | OUTPATIENT
Start: 2024-05-08 | End: 2024-05-08

## 2024-05-08 RX ORDER — ACETAMINOPHEN 500 MG
1000 TABLET ORAL ONCE
Refills: 0 | Status: COMPLETED | OUTPATIENT
Start: 2024-05-08 | End: 2024-05-08

## 2024-05-08 RX ORDER — HYDROMORPHONE HYDROCHLORIDE 2 MG/ML
0.5 INJECTION INTRAMUSCULAR; INTRAVENOUS; SUBCUTANEOUS EVERY 4 HOURS
Refills: 0 | Status: DISCONTINUED | OUTPATIENT
Start: 2024-05-08 | End: 2024-05-08

## 2024-05-08 RX ORDER — BACITRACIN ZINC 500 UNIT/G
1 OINTMENT IN PACKET (EA) TOPICAL
Refills: 0 | Status: DISCONTINUED | OUTPATIENT
Start: 2024-05-08 | End: 2024-05-14

## 2024-05-08 RX ADMIN — SODIUM CHLORIDE 150 MILLILITER(S): 9 INJECTION, SOLUTION INTRAVENOUS at 00:30

## 2024-05-08 RX ADMIN — HYDROMORPHONE HYDROCHLORIDE 0.5 MILLIGRAM(S): 2 INJECTION INTRAMUSCULAR; INTRAVENOUS; SUBCUTANEOUS at 12:37

## 2024-05-08 RX ADMIN — Medication 2000 MILLIGRAM(S): at 00:08

## 2024-05-08 RX ADMIN — ENOXAPARIN SODIUM 40 MILLIGRAM(S): 100 INJECTION SUBCUTANEOUS at 23:04

## 2024-05-08 RX ADMIN — ENOXAPARIN SODIUM 40 MILLIGRAM(S): 100 INJECTION SUBCUTANEOUS at 05:45

## 2024-05-08 RX ADMIN — Medication 650 MILLIGRAM(S): at 12:37

## 2024-05-08 RX ADMIN — Medication 650 MILLIGRAM(S): at 13:00

## 2024-05-08 RX ADMIN — HYDROMORPHONE HYDROCHLORIDE 0.5 MILLIGRAM(S): 2 INJECTION INTRAMUSCULAR; INTRAVENOUS; SUBCUTANEOUS at 13:00

## 2024-05-08 RX ADMIN — OXYCODONE HYDROCHLORIDE 10 MILLIGRAM(S): 5 TABLET ORAL at 09:05

## 2024-05-08 RX ADMIN — LIDOCAINE 2 PATCH: 4 CREAM TOPICAL at 19:48

## 2024-05-08 RX ADMIN — Medication 650 MILLIGRAM(S): at 17:28

## 2024-05-08 RX ADMIN — Medication 400 MILLIGRAM(S): at 00:15

## 2024-05-08 RX ADMIN — Medication 1 APPLICATION(S): at 17:28

## 2024-05-08 RX ADMIN — Medication 2000 MILLIGRAM(S): at 09:05

## 2024-05-08 RX ADMIN — OXYCODONE HYDROCHLORIDE 10 MILLIGRAM(S): 5 TABLET ORAL at 10:50

## 2024-05-08 RX ADMIN — CHLORHEXIDINE GLUCONATE 1 APPLICATION(S): 213 SOLUTION TOPICAL at 11:37

## 2024-05-08 RX ADMIN — LEVETIRACETAM 500 MILLIGRAM(S): 250 TABLET, FILM COATED ORAL at 17:28

## 2024-05-08 RX ADMIN — HYDROMORPHONE HYDROCHLORIDE 0.5 MILLIGRAM(S): 2 INJECTION INTRAMUSCULAR; INTRAVENOUS; SUBCUTANEOUS at 02:27

## 2024-05-08 RX ADMIN — OXYCODONE HYDROCHLORIDE 10 MILLIGRAM(S): 5 TABLET ORAL at 20:30

## 2024-05-08 RX ADMIN — Medication 85 MILLIMOLE(S): at 05:45

## 2024-05-08 RX ADMIN — Medication 1000 MILLIGRAM(S): at 00:45

## 2024-05-08 RX ADMIN — Medication 650 MILLIGRAM(S): at 18:32

## 2024-05-08 RX ADMIN — OXYCODONE HYDROCHLORIDE 10 MILLIGRAM(S): 5 TABLET ORAL at 19:48

## 2024-05-08 RX ADMIN — HYDROMORPHONE HYDROCHLORIDE 0.5 MILLIGRAM(S): 2 INJECTION INTRAMUSCULAR; INTRAVENOUS; SUBCUTANEOUS at 02:57

## 2024-05-08 RX ADMIN — Medication 650 MILLIGRAM(S): at 23:04

## 2024-05-08 RX ADMIN — Medication 650 MILLIGRAM(S): at 05:45

## 2024-05-08 RX ADMIN — SENNA PLUS 2 TABLET(S): 8.6 TABLET ORAL at 23:02

## 2024-05-08 RX ADMIN — SODIUM CHLORIDE 125 MILLILITER(S): 9 INJECTION, SOLUTION INTRAVENOUS at 11:37

## 2024-05-08 NOTE — PATIENT PROFILE ADULT - FALL HARM RISK - HARM RISK INTERVENTIONS

## 2024-05-08 NOTE — DISCHARGE NOTE PROVIDER - PROVIDER TOKENS
PROVIDER:[TOKEN:[[id number]]] PROVIDER:[TOKEN:[[id number]]],PROVIDER:[TOKEN:[[id number]]] PROVIDER:[TOKEN:[[id number]]],PROVIDER:[TOKEN:[[id number]]],PROVIDER:[TOKEN:[[id number]],FOLLOWUP:[1 week]]

## 2024-05-08 NOTE — PATIENT PROFILE ADULT - VISION (WITH CORRECTIVE LENSES IF THE PATIENT USUALLY WEARS THEM):
complains of intermittent blurriness./Normal vision: sees adequately in most situations; can see medication labels, newsprint

## 2024-05-08 NOTE — DISCHARGE NOTE PROVIDER - NSDCCPTREATMENT_GEN_ALL_CORE_FT
PRINCIPAL PROCEDURE  Procedure: Open reduction and internal fixation of symphysis pubis  Findings and Treatment:       SECONDARY PROCEDURE  Procedure: Simple left orchiectomy  Findings and Treatment:

## 2024-05-08 NOTE — DISCHARGE NOTE PROVIDER - CARE PROVIDER_API CALL
Huber Esqueda  Orthopaedic Surgery  [address] [place] [address]  Phone: [phone]  Fax: [phone]  Follow Up Time:    Huber Esqueda  Orthopaedic Surgery  [address] [place] [address]  Phone: [phone]  Fax: [phone]  Follow Up Time:     Rukhsana Mccauley  Orthopaedic Surgery  [address] [place] [address]  Phone: [phone]  Fax: [phone]  Follow Up Time:    Huber Esqueda  Orthopaedic Surgery  [address] [place] [address]  Phone: [phone]  Fax: [phone]  Follow Up Time:     Rukhsana Mccauley  Orthopaedic Surgery  [address] [place] [address]  Phone: [phone]  Fax: [phone]  Follow Up Time:     Benton Hahn  Urology  [address] [facility], [address]  [place] [address]  Phone: [phone]  Fax: [phone]  Follow Up Time: 1 week

## 2024-05-08 NOTE — DISCHARGE NOTE PROVIDER - CARE PROVIDERS DIRECT ADDRESSES
,[email or web address]"[employer]"[email or web address]"[employer]"[email or web address]
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
## 2024-05-08 NOTE — CONSULT NOTE ADULT - CONSULT REASON
Polytrauma
Questionable Tentorial SDH and L5 TP Fracture
Trauma B
Trauma B
r/o ruptured left testicle, evaluate blood flow
Trauma
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
## 2024-05-08 NOTE — DISCHARGE NOTE PROVIDER - NSDCCPCAREPLAN_GEN_ALL_CORE_FT
PRINCIPAL DISCHARGE DIAGNOSIS  Diagnosis: Pelvis fracture  Assessment and Plan of Treatment:       SECONDARY DISCHARGE DIAGNOSES  Diagnosis: Open skull fracture  Assessment and Plan of Treatment:     Diagnosis: Dislocation of left elbow  Assessment and Plan of Treatment:     Diagnosis: Orchitis  Assessment and Plan of Treatment:
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
## 2024-05-08 NOTE — PROGRESS NOTE ADULT - SUBJECTIVE AND OBJECTIVE BOX
new findings on x ray or right hand.   Radial styloid fracture, cock up wrist splint placed.   OR case cancelled for this evening.  Dr Mccauley will operate on Left Hand  saturday 1pm ]  patient may resume diet.

## 2024-05-08 NOTE — PROGRESS NOTE ADULT - NS ATTEND AMEND GEN_ALL_CORE FT
Saw pt with MARY ANN Ramon around 8 pm    Left hand in sugartong splint, right wrist in brace. Left fingers active motion, and intact sensation, no major pain.    Left hand CMC joint dislocation (/reduced, 5th MC fx, and distal scaphoid fx, also has right wrist ulnar styloid non-displaced fx.    Plan: Discussed with the pt, his two brothers.  Will plan to treat right ulnar styloid non-op using a wrist brace.  Will plan for ORIF or pinning for left hand dislocation and fractures, possibly this weekend.    Signed. Dr. Mccauley, Orthopaedic surgeon and Hand surgeon.
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
## 2024-05-08 NOTE — PROGRESS NOTE ADULT - SUBJECTIVE AND OBJECTIVE BOX
[facility]    [id number]    History:  The patient is status post pubic symphysis ORIF, percutaneous pinning of sacrum, POD#1. Patient under the care for a Left elbow dislocation (Reduced at [place]) Left hand 5th metacarpal fracture. HAND CT pending.  No new orthopedic complaint at this time.  Will need secondary survey.  No acute motor or sensory changes are reported.    Vital Signs Last 24 Hrs  T(C): 37.3 (08 May 2024 09:00), Max: 37.7 (07 May 2024 20:50)  T(F): 99.1 (08 May 2024 09:00), Max: 99.9 (07 May 2024 20:50)  HR: 88 (08 May 2024 09:00) (88 - 129)  BP: 149/74 (08 May 2024 08:43) (115/83 - 149/74)  BP(mean): 90 (08 May 2024 08:43) (90 - 99)  RR: 12 (08 May 2024 09:00) (11 - 27)  SpO2: 99% (08 May 2024 09:00) (98% - 100%)    Parameters below as of 08 May 2024 08:00  Patient On (Oxygen Delivery Method): room air      I&O's Summary    07 May 2024 07:01  -  08 May 2024 07:00  --------------------------------------------------------  IN: 4298.6 mL / OUT: 2435 mL / NET: 1863.6 mL    08 May 2024 07:01  -  08 May 2024 10:01  --------------------------------------------------------  IN: 616.6 mL / OUT: 905 mL / NET: -288.4 mL                              7.4    7.79  )-----------( 120      ( 08 May 2024 03:25 )             21.2     05-08    133<L>  |  100  |  10.6  ----------------------------<  152<H>  4.1   |  25.0  |  0.64    Ca    7.3<L>      08 May 2024 03:25  Phos  2.2     05-08  Mg     2.0     05-08    TPro  4.7<L>  /  Alb  2.8<L>  /  TBili  0.7  /  DBili  x   /  AST  172<H>  /  ALT  65<H>  /  AlkPhos  43  05-07      MEDICATIONS  (STANDING):  acetaminophen     Tablet .. 650 milliGRAM(s) Oral every 6 hours  chlorhexidine 2% Cloths 1 Application(s) Topical daily  enoxaparin Injectable 40 milliGRAM(s) SubCutaneous every 12 hours  lidocaine   4% Patch 2 Patch Transdermal every 24 hours  multiple electrolytes Injection Type 1 1000 milliLiter(s) (150 mL/Hr) IV Continuous <Continuous>  senna 2 Tablet(s) Oral at bedtime    MEDICATIONS  (PRN):  oxyCODONE    IR 5 milliGRAM(s) Oral every 4 hours PRN Moderate Pain (4 - 6)  oxyCODONE    IR 10 milliGRAM(s) Oral every 4 hours PRN Severe Pain (7 - 10)      Physical exam: Well padded splint is in good position to left upper extremity. motor and sensory intact. capillary refil brisk.   The pelvic  dressing remains clean, dry and intact. Drain functioning.  No wound erythema, discharge, drainage is noted. Sensation to light touch is grossly intact without focal deficit and is symmetric bilaterally lower extremity.  No calf tenderness. Sensation to light touch is grossly intact distally. Motor function distally is 5/5. No foot drop. 2+ dorsalis pedis pulse. Capillary refill is less than 2 seconds. No cyanosis.    Primary Orthopedic Assessment:  •   Secondary  Orthopedic Assessment(s):   •   Secondary  Medical Assessment(s):   •   Plan:   • DVT prophylaxis as prescribed, including use of compression devices and ankle pumps  • Non-weight bearing of the splinted Left upper extremity  • Continue splint as applied  • Elevation of the splinted extremity  CT  Left hand pending.  • Pain control as clinically indicated  • Incentive spirometry encouraged  • NPO for possible OR today with Dr Mccauley for left hand 5th metacarpal pinning. pending CT report     [facility]    [id number]    History:  The patient is status post pubic symphysis ORIF, percutaneous pinning of sacrum, POD#1. Patient under the care for a Left elbow dislocation (Reduced at [place]) Left hand 5th metacarpal fracture. HAND CT pending.  No new orthopedic complaint at this time.  Will need secondary survey.  No acute motor or sensory changes are reported.    Vital Signs Last 24 Hrs  T(C): 37.3 (08 May 2024 09:00), Max: 37.7 (07 May 2024 20:50)  T(F): 99.1 (08 May 2024 09:00), Max: 99.9 (07 May 2024 20:50)  HR: 88 (08 May 2024 09:00) (88 - 129)  BP: 149/74 (08 May 2024 08:43) (115/83 - 149/74)  BP(mean): 90 (08 May 2024 08:43) (90 - 99)  RR: 12 (08 May 2024 09:00) (11 - 27)  SpO2: 99% (08 May 2024 09:00) (98% - 100%)    Parameters below as of 08 May 2024 08:00  Patient On (Oxygen Delivery Method): room air      I&O's Summary    07 May 2024 07:01  -  08 May 2024 07:00  --------------------------------------------------------  IN: 4298.6 mL / OUT: 2435 mL / NET: 1863.6 mL    08 May 2024 07:01  -  08 May 2024 10:01  --------------------------------------------------------  IN: 616.6 mL / OUT: 905 mL / NET: -288.4 mL                              7.4    7.79  )-----------( 120      ( 08 May 2024 03:25 )             21.2     05-08    133<L>  |  100  |  10.6  ----------------------------<  152<H>  4.1   |  25.0  |  0.64    Ca    7.3<L>      08 May 2024 03:25  Phos  2.2     05-08  Mg     2.0     05-08    TPro  4.7<L>  /  Alb  2.8<L>  /  TBili  0.7  /  DBili  x   /  AST  172<H>  /  ALT  65<H>  /  AlkPhos  43  05-07      MEDICATIONS  (STANDING):  acetaminophen     Tablet .. 650 milliGRAM(s) Oral every 6 hours  chlorhexidine 2% Cloths 1 Application(s) Topical daily  enoxaparin Injectable 40 milliGRAM(s) SubCutaneous every 12 hours  lidocaine   4% Patch 2 Patch Transdermal every 24 hours  multiple electrolytes Injection Type 1 1000 milliLiter(s) (150 mL/Hr) IV Continuous <Continuous>  senna 2 Tablet(s) Oral at bedtime    MEDICATIONS  (PRN):  oxyCODONE    IR 5 milliGRAM(s) Oral every 4 hours PRN Moderate Pain (4 - 6)  oxyCODONE    IR 10 milliGRAM(s) Oral every 4 hours PRN Severe Pain (7 - 10)      Physical exam: Well padded splint is in good position to left upper extremity. motor and sensory intact. capillary refil brisk.   The pelvic  dressing remains clean, dry and intact. Drain functioning.  No wound erythema, discharge, drainage is noted. Sensation to light touch is grossly intact without focal deficit and is symmetric bilaterally lower extremity.  No calf tenderness. Sensation to light touch is grossly intact distally. Motor function distally is 5/5. No foot drop. 2+ dorsalis pedis pulse. Capillary refill is less than 2 seconds. No cyanosis.  New findings on x ray show right hand radial styloid fracture. Discussed with Dr Mccauley , cock up wrist splint will be affective in stabilizing the fracture.   Primary Orthopedic Assessment:  •   Secondary  Orthopedic Assessment(s):   •   Secondary  Medical Assessment(s):   •   Plan:   • DVT prophylaxis as prescribed, including use of compression devices and ankle pumps  • Non-weight bearing of the splinted Left upper extremity  • Continue splint as applied  • Elevation of the splinted extremity  CT  Left hand pending.  • Pain control as clinically indicated  • Incentive spirometry encouraged  • NPO for possible OR today with Dr Mccauley for left hand 5th metacarpal pinning. pending CT report

## 2024-05-08 NOTE — DISCHARGE NOTE PROVIDER - NSDCFUADDINST_GEN_ALL_CORE_FT
Orthopedic discharge instructions   Weight bear as tolerated RLE, TTWB LLE   Lovenox daily x 6 weeks for DVTP   follow up in office within 1 week of discharge    ORTHOPEDIC RECOMMENDATIONS:   Dr. Esqueda - (pelvis/sacral follow-up)The patient will be seen in the office between 2-3 weeks for wound check. Dr. Mccauley-(Wrist/elbow/hand) The patient will be seen in the office between 2-3 weeks for wound check. Suture* will be removed at that time. Continue splint and dressing to to left upper extremity until follow-up visit with Dr. Mccauley. continue brace to right wrist-may weight bear through right elbow to right upper extremity.  Patient may shower after post-op day #5-DO NOT GET DRESSINGS/SPLINT/BRACE wet. The silver based pelvic dressing is to be removed on post-op day #10. The patient will contact the office if the wound becomes red, has increasing pain, develops bleeding or discharge, an injury occurs, or has other concerns. The patient will continue PT for gait training.  The patient will continue LOVENOX for 6 weeks DVTP. The patient is FULL weight bearing right lower extremity. The patient is Toe touch weight bearing to left lower extremity.     ORTHOPEDIC RECOMMENDATIONS:   Dr. Esqueda - (pelvis/sacral follow-up)The patient will be seen in the office between 2-3 weeks for wound check. The patient is FULL weight bearing right lower extremity. The patient is Toe touch weight bearing to left lower extremity. Patient may shower after post-op day #5-DO NOT GET DRESSINGS/SPLINT/BRACE wet. Continue steri strips to left sacrum and pubic incision until follow-up with Dr. Esqueda. The patient will contact the office if the wound becomes red, has increasing pain, develops bleeding or discharge, an injury occurs, or has other concerns. The patient will continue PT for gait training.      Dr. Mccauley-(Wrist/elbow/hand) The patient will be seen in the office between 2-3 weeks for wound check. Suture* will be removed at that time. Continue splint and dressing to left upper extremity until follow-up visit with Dr. Mccauley. may perform left elbow and finger range of motion. continue brace to right wrist-may weight bear through right elbow to right upper extremity.  Patient may shower after post-op day #5-DO NOT GET DRESSINGS/SPLINT/BRACE wet. Continue steri strips to left sacrum and pubic incision until follow-up with Dr. Esqueda. The patient will contact the office if the wound becomes red, has increasing pain, develops bleeding or discharge, an injury occurs, or has other concerns. The patient will continue PT for gait training.  The patient is non weight bearing to left upper extremity and Partial weight bearing to right upper extremity.       The patient will continue LOVENOX for 6 weeks DVTP.

## 2024-05-08 NOTE — DISCHARGE NOTE PROVIDER - NSFOLLOWUPCLINICS_GEN_ALL_ED_FT
[facility] Acute Care Surgery  Acute Care Surgery  [address] [place] [id number]  Phone: [phone]  Fax:   Follow Up Time: 1 week

## 2024-05-08 NOTE — CONSULT NOTE ADULT - CONSULT REQUESTED DATE/TIME
07-May-2024 01:26
07-May-2024 02:15
07-May-2024 02:27
08-May-2024 11:08
07-May-2024 11:53
07-May-2024 02:42

## 2024-05-08 NOTE — PROGRESS NOTE ADULT - SUBJECTIVE AND OBJECTIVE BOX
SUBJECTIVE / 24H EVENTS:    Pt seen and examined at bedside during morning rounds. Pt found to be sleeping in bed comfortably. Pt remained afebrile and hemodynamically stable overnight. Pt did not express any complaints at the time of examination. Pt denies CP, SOB, fever, chills.     MEDICATIONS  (STANDING):  acetaminophen     Tablet .. 650 milliGRAM(s) Oral every 6 hours  chlorhexidine 2% Cloths 1 Application(s) Topical daily  enoxaparin Injectable 40 milliGRAM(s) SubCutaneous every 12 hours  lidocaine   4% Patch 2 Patch Transdermal every 24 hours  multiple electrolytes Injection Type 1 1000 milliLiter(s) (150 mL/Hr) IV Continuous <Continuous>  senna 2 Tablet(s) Oral at bedtime    MEDICATIONS  (PRN):  HYDROmorphone  Injectable 0.5 milliGRAM(s) IV Push every 4 hours PRN breakthrough  oxyCODONE    IR 5 milliGRAM(s) Oral every 4 hours PRN Moderate Pain (4 - 6)  oxyCODONE    IR 10 milliGRAM(s) Oral every 4 hours PRN Severe Pain (7 - 10)      Vital Signs Last 24 Hrs  T(C): 37.3 (08 May 2024 09:00), Max: 37.7 (07 May 2024 20:50)  T(F): 99.1 (08 May 2024 09:00), Max: 99.9 (07 May 2024 20:50)  HR: 88 (08 May 2024 09:00) (88 - 129)  BP: 149/74 (08 May 2024 08:43) (115/83 - 149/74)  BP(mean): 90 (08 May 2024 08:43) (90 - 99)  RR: 12 (08 May 2024 09:00) (11 - 27)  SpO2: 99% (08 May 2024 09:00) (98% - 100%)    Parameters below as of 08 May 2024 08:00  Patient On (Oxygen Delivery Method): room air    Physical Exam   Constitutional: patient appears comfortable resting in bed, in no apparent distress  HEENT: head normocephalic, repaired forehead laceration, c-collar in place  Respiratory: respirations are unlabored, no accessory muscle use, no conversational dyspnea  Cardiovascular: regular rate & rhythm  : mesh underwear and krilex padding scrotum intact and dry, sutures in place without signs of erythema, small distal penile superficial laceration healing well, right testicle elevated into inguinal region  Musculoskeletal: well padded splint is in good position to left upper extremity      I&O's Detail    07 May 2024 07:01  -  08 May 2024 07:00  --------------------------------------------------------  IN:    Cryoprecipitate: 252 mL    IV PiggyBack: 50 mL    IV PiggyBack: 100 mL    IV PiggyBack: 166.6 mL    multiple electrolytes Injection Type 1 Bolus: 1000 mL    multiple electrolytes Injection Type 1.: 1080 mL    multiple electrolytes Injection Type 1.: 1650 mL  Total IN: 4298.6 mL    OUT:    Accordian (mL): 400 mL    Indwelling Catheter - Urethral (mL): 2035 mL  Total OUT: 2435 mL    Total NET: 1863.6 mL      08 May 2024 07:01  -  08 May 2024 10:26  --------------------------------------------------------  IN:    IV PiggyBack: 166.6 mL    multiple electrolytes Injection Type 1.: 450 mL  Total IN: 616.6 mL    OUT:    Indwelling Catheter - Urethral (mL): 905 mL  Total OUT: 905 mL    Total NET: -288.4 mL          LABS:                        7.4    7.79  )-----------( 120      ( 08 May 2024 03:25 )             21.2     05-08    133<L>  |  100  |  10.6  ----------------------------<  152<H>  4.1   |  25.0  |  0.64    Ca    7.3<L>      08 May 2024 03:25  Phos  2.2     05-08  Mg     2.0     05-08    TPro  4.7<L>  /  Alb  2.8<L>  /  TBili  0.7  /  DBili  x   /  AST  172<H>  /  ALT  65<H>  /  AlkPhos  43  05-07    PT/INR - ( 07 May 2024 21:00 )   PT: 13.2 sec;   INR: 1.20 ratio         PTT - ( 07 May 2024 21:00 )  PTT:26.0 sec  Urinalysis Basic - ( 08 May 2024 03:25 )    Color: x / Appearance: x / SG: x / pH: x  Gluc: 152 mg/dL / Ketone: x  / Bili: x / Urobili: x   Blood: x / Protein: x / Nitrite: x   Leuk Esterase: x / RBC: x / WBC x   Sq Epi: x / Non Sq Epi: x / Bacteria: x
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
## 2024-05-08 NOTE — PROGRESS NOTE ADULT - SUBJECTIVE AND OBJECTIVE BOX
INTERVAL HPI/OVERNIGHT EVENTS:    IR embolization.  L orchiectomy.  R noted to be undescended testicle.  ORIF pelvis/sacrum.  ORIF of digits of LUE.  CPKs down trending to 7900    SUBJECTIVE:      MEDICATIONS  (STANDING):  acetaminophen     Tablet .. 650 milliGRAM(s) Oral every 6 hours  chlorhexidine 2% Cloths 1 Application(s) Topical daily  enoxaparin Injectable 40 milliGRAM(s) SubCutaneous every 12 hours  lidocaine   4% Patch 2 Patch Transdermal every 24 hours  multiple electrolytes Injection Type 1 1000 milliLiter(s) (150 mL/Hr) IV Continuous <Continuous>  senna 2 Tablet(s) Oral at bedtime    MEDICATIONS  (PRN):  oxyCODONE    IR 10 milliGRAM(s) Oral every 4 hours PRN Severe Pain (7 - 10)  oxyCODONE    IR 5 milliGRAM(s) Oral every 4 hours PRN Moderate Pain (4 - 6)      Drug Dosing Weight  Height (cm): 175.3 (07 May 2024 02:00)  Weight (kg): 77.9 (07 May 2024 02:00)  BMI (kg/m2): 25.3 (07 May 2024 02:00)  BSA (m2): 1.94 (07 May 2024 02:00)    PAST MEDICAL & SURGICAL HISTORY:  No pertinent past medical history      No significant past surgical history        ICU Vital Signs Last 24 Hrs  T(C): 37.3 (08 May 2024 09:00), Max: 37.7 (07 May 2024 20:50)  T(F): 99.1 (08 May 2024 09:00), Max: 99.9 (07 May 2024 20:50)  HR: 88 (08 May 2024 09:00) (88 - 129)  BP: 149/74 (08 May 2024 08:43) (115/83 - 149/74)  BP(mean): 90 (08 May 2024 08:43) (90 - 99)  ABP: 172/67 (08 May 2024 09:00) (136/61 - 173/88)  ABP(mean): 93 (08 May 2024 09:00) (79 - 232)  RR: 12 (08 May 2024 09:00) (11 - 27)  SpO2: 99% (08 May 2024 09:00) (98% - 100%)    O2 Parameters below as of 08 May 2024 08:00  Patient On (Oxygen Delivery Method): room air            I&O's Detail    07 May 2024 07:01  -  08 May 2024 07:00  --------------------------------------------------------  IN:    Cryoprecipitate: 252 mL    IV PiggyBack: 50 mL    IV PiggyBack: 100 mL    IV PiggyBack: 166.6 mL    multiple electrolytes Injection Type 1 Bolus: 1000 mL    multiple electrolytes Injection Type 1.: 1080 mL    multiple electrolytes Injection Type 1.: 1650 mL  Total IN: 4298.6 mL    OUT:    Accordian (mL): 400 mL    Indwelling Catheter - Urethral (mL): 2035 mL  Total OUT: 2435 mL    Total NET: 1863.6 mL      08 May 2024 07:01  -  08 May 2024 10:12  --------------------------------------------------------  IN:    IV PiggyBack: 166.6 mL    multiple electrolytes Injection Type 1.: 450 mL  Total IN: 616.6 mL    OUT:    Indwelling Catheter - Urethral (mL): 905 mL  Total OUT: 905 mL    Total NET: -288.4 mL            Physical Exam:    Neurological: Awake,     HEENT:     Neck: Neck supple, No JVD    Respiratory:  Equal chest rise.  Breath Sounds equal bilateral    Cardiovascular:     Gastrointestinal:     Extremities:    Vascular:     Skin: No rashes    PATIENT CARE ACCESS DEVICES:  [ ] Peripheral IV  [ ] Central Venous Line	[ ] R	[ ] L	[ ] IJ	[ ] Fem	[ ] SC	Placed:   [ ] Arterial Line		[ ] R	[ ] L	[ ] Fem	[ ] Rad	[ ] Ax	Placed:   [ ] PICC:					[ ] Mediport  [ ] Urinary Catheter:   [ ] Necessity of urinary, arterial, and venous catheters discussed    LABS:  CBC Full  -  ( 08 May 2024 03:25 )  WBC Count : 7.79 K/uL  RBC Count : 2.43 M/uL  Hemoglobin : 7.4 g/dL  Hematocrit : 21.2 %  Platelet Count - Automated : 120 K/uL  Mean Cell Volume : 87.2 fl  Mean Cell Hemoglobin : 30.5 pg  Mean Cell Hemoglobin Concentration : 34.9 gm/dL  Auto Neutrophil # : 5.79 K/uL  Auto Lymphocyte # : 1.12 K/uL  Auto Monocyte # : 0.82 K/uL  Auto Eosinophil # : 0.00 K/uL  Auto Basophil # : 0.02 K/uL  Auto Neutrophil % : 74.3 %  Auto Lymphocyte % : 14.4 %  Auto Monocyte % : 10.5 %  Auto Eosinophil % : 0.0 %  Auto Basophil % : 0.3 %    05-08    133<L>  |  100  |  10.6  ----------------------------<  152<H>  4.1   |  25.0  |  0.64    Ca    7.3<L>      08 May 2024 03:25  Phos  2.2     05-08  Mg     2.0     05-08    TPro  4.7<L>  /  Alb  2.8<L>  /  TBili  0.7  /  DBili  x   /  AST  172<H>  /  ALT  65<H>  /  AlkPhos  43  05-07    PT/INR - ( 07 May 2024 21:00 )   PT: 13.2 sec;   INR: 1.20 ratio         PTT - ( 07 May 2024 21:00 )  PTT:26.0 sec  Urinalysis Basic - ( 08 May 2024 03:25 )    Color: x / Appearance: x / SG: x / pH: x  Gluc: 152 mg/dL / Ketone: x  / Bili: x / Urobili: x   Blood: x / Protein: x / Nitrite: x   Leuk Esterase: x / RBC: x / WBC x   Sq Epi: x / Non Sq Epi: x / Bacteria: x           INTERVAL HPI/OVERNIGHT EVENTS:    IR embolization.  L orchiectomy.  R noted to be undescended testicle.  ORIF pelvis/sacrum.  ORIF of digits of LUE.  CPKs down trending to 7900    SUBJECTIVE:      MEDICATIONS  (STANDING):  acetaminophen     Tablet .. 650 milliGRAM(s) Oral every 6 hours  chlorhexidine 2% Cloths 1 Application(s) Topical daily  enoxaparin Injectable 40 milliGRAM(s) SubCutaneous every 12 hours  lidocaine   4% Patch 2 Patch Transdermal every 24 hours  multiple electrolytes Injection Type 1 1000 milliLiter(s) (150 mL/Hr) IV Continuous <Continuous>  senna 2 Tablet(s) Oral at bedtime    MEDICATIONS  (PRN):  oxyCODONE    IR 10 milliGRAM(s) Oral every 4 hours PRN Severe Pain (7 - 10)  oxyCODONE    IR 5 milliGRAM(s) Oral every 4 hours PRN Moderate Pain (4 - 6)      Drug Dosing Weight  Height (cm): 175.3 (07 May 2024 02:00)  Weight (kg): 77.9 (07 May 2024 02:00)  BMI (kg/m2): 25.3 (07 May 2024 02:00)  BSA (m2): 1.94 (07 May 2024 02:00)    PAST MEDICAL & SURGICAL HISTORY:  No pertinent past medical history      No significant past surgical history        ICU Vital Signs Last 24 Hrs  T(C): 37.3 (08 May 2024 09:00), Max: 37.7 (07 May 2024 20:50)  T(F): 99.1 (08 May 2024 09:00), Max: 99.9 (07 May 2024 20:50)  HR: 88 (08 May 2024 09:00) (88 - 129)  BP: 149/74 (08 May 2024 08:43) (115/83 - 149/74)  BP(mean): 90 (08 May 2024 08:43) (90 - 99)  ABP: 172/67 (08 May 2024 09:00) (136/61 - 173/88)  ABP(mean): 93 (08 May 2024 09:00) (79 - 232)  RR: 12 (08 May 2024 09:00) (11 - 27)  SpO2: 99% (08 May 2024 09:00) (98% - 100%)    O2 Parameters below as of 08 May 2024 08:00  Patient On (Oxygen Delivery Method): room air            I&O's Detail    07 May 2024 07:01  -  08 May 2024 07:00  --------------------------------------------------------  IN:    Cryoprecipitate: 252 mL    IV PiggyBack: 50 mL    IV PiggyBack: 100 mL    IV PiggyBack: 166.6 mL    multiple electrolytes Injection Type 1 Bolus: 1000 mL    multiple electrolytes Injection Type 1.: 1080 mL    multiple electrolytes Injection Type 1.: 1650 mL  Total IN: 4298.6 mL    OUT:    Accordian (mL): 400 mL    Indwelling Catheter - Urethral (mL): 2035 mL  Total OUT: 2435 mL    Total NET: 1863.6 mL      08 May 2024 07:01  -  08 May 2024 10:12  --------------------------------------------------------  IN:    IV PiggyBack: 166.6 mL    multiple electrolytes Injection Type 1.: 450 mL  Total IN: 616.6 mL    OUT:    Indwelling Catheter - Urethral (mL): 905 mL  Total OUT: 905 mL    Total NET: -288.4 mL            Physical Exam:    Neurological: Awake, GCS 15, oriented to hospital, name and month, appropriate    HEENT: Conjunctival hemorrhage on Right eye, EOMI    Neck: Neck supple, No JVD, non tender    Respiratory:  Equal chest rise.  Breath Sounds equal bilateral    Cardiovascular: RRR w/tachycardia    Gastrointestinal: Soft, flat, non tender, non distended    Extremities: LUE w/decreased motion and sensation of left hand/fingers    Vascular: Palpable DP/PT pulses    Back:  Non tender, no stepoffs    Skin: No rashes, tattoos over entire back, chest and arm    PATIENT CARE ACCESS DEVICES:  xx] Peripheral IV  [ ] Central Venous Line	[ ] R	[ ] L	[ ] IJ	[ ] Fem	[ ] SC	Placed:   [ x] Arterial Line		[x ] R	[ ] L	[ ] Fem	[x ] Rad	[ ] Ax	Placed:   [ ] PICC:					[ ] Mediport  [x ] Urinary Catheter:   [ ] Necessity of urinary, arterial, and venous catheters discussed    LABS:  CBC Full  -  ( 08 May 2024 03:25 )  WBC Count : 7.79 K/uL  RBC Count : 2.43 M/uL  Hemoglobin : 7.4 g/dL  Hematocrit : 21.2 %  Platelet Count - Automated : 120 K/uL  Mean Cell Volume : 87.2 fl  Mean Cell Hemoglobin : 30.5 pg  Mean Cell Hemoglobin Concentration : 34.9 gm/dL  Auto Neutrophil # : 5.79 K/uL  Auto Lymphocyte # : 1.12 K/uL  Auto Monocyte # : 0.82 K/uL  Auto Eosinophil # : 0.00 K/uL  Auto Basophil # : 0.02 K/uL  Auto Neutrophil % : 74.3 %  Auto Lymphocyte % : 14.4 %  Auto Monocyte % : 10.5 %  Auto Eosinophil % : 0.0 %  Auto Basophil % : 0.3 %    05-08    133<L>  |  100  |  10.6  ----------------------------<  152<H>  4.1   |  25.0  |  0.64    Ca    7.3<L>      08 May 2024 03:25  Phos  2.2     05-08  Mg     2.0     05-08    TPro  4.7<L>  /  Alb  2.8<L>  /  TBili  0.7  /  DBili  x   /  AST  172<H>  /  ALT  65<H>  /  AlkPhos  43  05-07    PT/INR - ( 07 May 2024 21:00 )   PT: 13.2 sec;   INR: 1.20 ratio         PTT - ( 07 May 2024 21:00 )  PTT:26.0 sec  Urinalysis Basic - ( 08 May 2024 03:25 )    Color: x / Appearance: x / SG: x / pH: x  Gluc: 152 mg/dL / Ketone: x  / Bili: x / Urobili: x   Blood: x / Protein: x / Nitrite: x   Leuk Esterase: x / RBC: x / WBC x   Sq Epi: x / Non Sq Epi: x / Bacteria: x           INTERVAL HPI/OVERNIGHT EVENTS:    IR embolization.  L orchiectomy.  R noted to be undescended testicle.  ORIF pelvis/sacrum.  ORIF of digits of LUE.  CPKs down trending to 7900    SUBJECTIVE:  Reports mild pain of Left hand/elbow as well as right wrist.  Denies chest pain or shortness of breath.    MEDICATIONS  (STANDING):  acetaminophen     Tablet .. 650 milliGRAM(s) Oral every 6 hours  chlorhexidine 2% Cloths 1 Application(s) Topical daily  enoxaparin Injectable 40 milliGRAM(s) SubCutaneous every 12 hours  lidocaine   4% Patch 2 Patch Transdermal every 24 hours  multiple electrolytes Injection Type 1 1000 milliLiter(s) (150 mL/Hr) IV Continuous <Continuous>  senna 2 Tablet(s) Oral at bedtime    MEDICATIONS  (PRN):  oxyCODONE    IR 10 milliGRAM(s) Oral every 4 hours PRN Severe Pain (7 - 10)  oxyCODONE    IR 5 milliGRAM(s) Oral every 4 hours PRN Moderate Pain (4 - 6)      Drug Dosing Weight  Height (cm): 175.3 (07 May 2024 02:00)  Weight (kg): 77.9 (07 May 2024 02:00)  BMI (kg/m2): 25.3 (07 May 2024 02:00)  BSA (m2): 1.94 (07 May 2024 02:00)    PAST MEDICAL & SURGICAL HISTORY:  No pertinent past medical history      No significant past surgical history        ICU Vital Signs Last 24 Hrs  T(C): 37.3 (08 May 2024 09:00), Max: 37.7 (07 May 2024 20:50)  T(F): 99.1 (08 May 2024 09:00), Max: 99.9 (07 May 2024 20:50)  HR: 88 (08 May 2024 09:00) (88 - 129)  BP: 149/74 (08 May 2024 08:43) (115/83 - 149/74)  BP(mean): 90 (08 May 2024 08:43) (90 - 99)  ABP: 172/67 (08 May 2024 09:00) (136/61 - 173/88)  ABP(mean): 93 (08 May 2024 09:00) (79 - 232)  RR: 12 (08 May 2024 09:00) (11 - 27)  SpO2: 99% (08 May 2024 09:00) (98% - 100%)    O2 Parameters below as of 08 May 2024 08:00  Patient On (Oxygen Delivery Method): room air      I&O's Detail    07 May 2024 07:01  -  08 May 2024 07:00  --------------------------------------------------------  IN:    Cryoprecipitate: 252 mL    IV PiggyBack: 50 mL    IV PiggyBack: 100 mL    IV PiggyBack: 166.6 mL    multiple electrolytes Injection Type 1 Bolus: 1000 mL    multiple electrolytes Injection Type 1.: 1080 mL    multiple electrolytes Injection Type 1.: 1650 mL  Total IN: 4298.6 mL    OUT:    Accordian (mL): 400 mL    Indwelling Catheter - Urethral (mL): 2035 mL  Total OUT: 2435 mL    Total NET: 1863.6 mL      08 May 2024 07:01  -  08 May 2024 10:12  --------------------------------------------------------  IN:    IV PiggyBack: 166.6 mL    multiple electrolytes Injection Type 1.: 450 mL  Total IN: 616.6 mL    OUT:    Indwelling Catheter - Urethral (mL): 905 mL  Total OUT: 905 mL    Total NET: -288.4 mL            Physical Exam:    Neurological: Awake, GCS 15, oriented to hospital, name and month, appropriate    HEENT: Conjunctival hemorrhage on Right eye, EOMI, forehead laceration that extends laterally to left scalp - sutured anteriorly and two staples within hairline    Neck: Neck supple, No JVD, non tender    Respiratory:  Equal chest rise.  Breath Sounds equal bilateral, anterior chest wall w/superficial abrasions and L chest wall superficial laceration (not closed)    Cardiovascular: RRR w/tachycardia    Gastrointestinal: Soft, flat, non tender, non distended, groin dressed w/gauze panties and packing of left scrotum    Extremities: LUE splinted, fingers warm w/decreased motion and sensation of left hand/fingers (unchanged per team), tender at right shoulder w/o swelling or deformity, suprapubic drain noted w/dark bloody drainage (400cc in total)    Vascular: Palpable DP/PT pulses    Back:  Non tender, no stepoffs    Skin: No rashes, tattoos over entire back, chest and arm    PATIENT CARE ACCESS DEVICES:  xx] Peripheral IV  [ ] Central Venous Line	[ ] R	[ ] L	[ ] IJ	[ ] Fem	[ ] SC	Placed:   [ x] Arterial Line		[x ] R	[ ] L	[ ] Fem	[x ] Rad	[ ] Ax	Placed:   [ ] PICC:					[ ] Mediport  [x ] Urinary Catheter:   [ ] Necessity of urinary, arterial, and venous catheters discussed    LABS:  CBC Full  -  ( 08 May 2024 03:25 )  WBC Count : 7.79 K/uL  RBC Count : 2.43 M/uL  Hemoglobin : 7.4 g/dL  Hematocrit : 21.2 %  Platelet Count - Automated : 120 K/uL  Mean Cell Volume : 87.2 fl  Mean Cell Hemoglobin : 30.5 pg  Mean Cell Hemoglobin Concentration : 34.9 gm/dL  Auto Neutrophil # : 5.79 K/uL  Auto Lymphocyte # : 1.12 K/uL  Auto Monocyte # : 0.82 K/uL  Auto Eosinophil # : 0.00 K/uL  Auto Basophil # : 0.02 K/uL  Auto Neutrophil % : 74.3 %  Auto Lymphocyte % : 14.4 %  Auto Monocyte % : 10.5 %  Auto Eosinophil % : 0.0 %  Auto Basophil % : 0.3 %    05-08    133<L>  |  100  |  10.6  ----------------------------<  152<H>  4.1   |  25.0  |  0.64    Ca    7.3<L>      08 May 2024 03:25  Phos  2.2     05-08  Mg     2.0     05-08    TPro  4.7<L>  /  Alb  2.8<L>  /  TBili  0.7  /  DBili  x   /  AST  172<H>  /  ALT  65<H>  /  AlkPhos  43  05-07    PT/INR - ( 07 May 2024 21:00 )   PT: 13.2 sec;   INR: 1.20 ratio         PTT - ( 07 May 2024 21:00 )  PTT:26.0 sec  Urinalysis Basic - ( 08 May 2024 03:25 )    Color: x / Appearance: x / SG: x / pH: x  Gluc: 152 mg/dL / Ketone: x  / Bili: x / Urobili: x   Blood: x / Protein: x / Nitrite: x   Leuk Esterase: x / RBC: x / WBC x   Sq Epi: x / Non Sq Epi: x / Bacteria: x

## 2024-05-09 LAB
ANION GAP SERPL CALC-SCNC: 9 MMOL/L — SIGNIFICANT CHANGE UP (ref 5–17)
BASOPHILS # BLD AUTO: 0.03 K/UL — SIGNIFICANT CHANGE UP (ref 0–0.2)
BASOPHILS NFR BLD AUTO: 0.4 % — SIGNIFICANT CHANGE UP (ref 0–2)
BUN SERPL-MCNC: 8.1 MG/DL — SIGNIFICANT CHANGE UP (ref 8–20)
CALCIUM SERPL-MCNC: 8 MG/DL — LOW (ref 8.4–10.5)
CHLORIDE SERPL-SCNC: 102 MMOL/L — SIGNIFICANT CHANGE UP (ref 96–108)
CK MB CFR SERPL CALC: 18.7 NG/ML — HIGH (ref 0–6.7)
CK SERPL-CCNC: 6116 U/L — HIGH (ref 30–200)
CO2 SERPL-SCNC: 25 MMOL/L — SIGNIFICANT CHANGE UP (ref 22–29)
CREAT SERPL-MCNC: 0.55 MG/DL — SIGNIFICANT CHANGE UP (ref 0.5–1.3)
EGFR: 138 ML/MIN/1.73M2 — SIGNIFICANT CHANGE UP
EOSINOPHIL # BLD AUTO: 0.1 K/UL — SIGNIFICANT CHANGE UP (ref 0–0.5)
EOSINOPHIL NFR BLD AUTO: 1.3 % — SIGNIFICANT CHANGE UP (ref 0–6)
GLUCOSE SERPL-MCNC: 98 MG/DL — SIGNIFICANT CHANGE UP (ref 70–99)
HCT VFR BLD CALC: 22.9 % — LOW (ref 39–50)
HGB BLD-MCNC: 8.1 G/DL — LOW (ref 13–17)
IMM GRANULOCYTES NFR BLD AUTO: 0.4 % — SIGNIFICANT CHANGE UP (ref 0–0.9)
LYMPHOCYTES # BLD AUTO: 1.8 K/UL — SIGNIFICANT CHANGE UP (ref 1–3.3)
LYMPHOCYTES # BLD AUTO: 23.4 % — SIGNIFICANT CHANGE UP (ref 13–44)
MAGNESIUM SERPL-MCNC: 2 MG/DL — SIGNIFICANT CHANGE UP (ref 1.6–2.6)
MCHC RBC-ENTMCNC: 31.2 PG — SIGNIFICANT CHANGE UP (ref 27–34)
MCHC RBC-ENTMCNC: 35.4 GM/DL — SIGNIFICANT CHANGE UP (ref 32–36)
MCV RBC AUTO: 88.1 FL — SIGNIFICANT CHANGE UP (ref 80–100)
MONOCYTES # BLD AUTO: 0.57 K/UL — SIGNIFICANT CHANGE UP (ref 0–0.9)
MONOCYTES NFR BLD AUTO: 7.4 % — SIGNIFICANT CHANGE UP (ref 2–14)
NEUTROPHILS # BLD AUTO: 5.15 K/UL — SIGNIFICANT CHANGE UP (ref 1.8–7.4)
NEUTROPHILS NFR BLD AUTO: 67.1 % — SIGNIFICANT CHANGE UP (ref 43–77)
PHOSPHATE SERPL-MCNC: 1.6 MG/DL — LOW (ref 2.4–4.7)
PLATELET # BLD AUTO: 95 K/UL — LOW (ref 150–400)
POTASSIUM SERPL-MCNC: 3.7 MMOL/L — SIGNIFICANT CHANGE UP (ref 3.5–5.3)
POTASSIUM SERPL-SCNC: 3.7 MMOL/L — SIGNIFICANT CHANGE UP (ref 3.5–5.3)
RBC # BLD: 2.6 M/UL — LOW (ref 4.2–5.8)
RBC # FLD: 13.5 % — SIGNIFICANT CHANGE UP (ref 10.3–14.5)
SODIUM SERPL-SCNC: 136 MMOL/L — SIGNIFICANT CHANGE UP (ref 135–145)
WBC # BLD: 7.68 K/UL — SIGNIFICANT CHANGE UP (ref 3.8–10.5)
WBC # FLD AUTO: 7.68 K/UL — SIGNIFICANT CHANGE UP (ref 3.8–10.5)

## 2024-05-09 PROCEDURE — 99291 CRITICAL CARE FIRST HOUR: CPT

## 2024-05-09 PROCEDURE — 99233 SBSQ HOSP IP/OBS HIGH 50: CPT | Mod: GC

## 2024-05-09 PROCEDURE — 99232 SBSQ HOSP IP/OBS MODERATE 35: CPT | Mod: 24

## 2024-05-09 RX ORDER — SODIUM,POTASSIUM PHOSPHATES 278-250MG
1 POWDER IN PACKET (EA) ORAL ONCE
Refills: 0 | Status: COMPLETED | OUTPATIENT
Start: 2024-05-09 | End: 2024-05-09

## 2024-05-09 RX ORDER — LANOLIN ALCOHOL/MO/W.PET/CERES
5 CREAM (GRAM) TOPICAL AT BEDTIME
Refills: 0 | Status: DISCONTINUED | OUTPATIENT
Start: 2024-05-09 | End: 2024-05-14

## 2024-05-09 RX ORDER — ENOXAPARIN SODIUM 100 MG/ML
30 INJECTION SUBCUTANEOUS EVERY 12 HOURS
Refills: 0 | Status: DISCONTINUED | OUTPATIENT
Start: 2024-05-09 | End: 2024-05-11

## 2024-05-09 RX ADMIN — Medication 5 MILLIGRAM(S): at 23:29

## 2024-05-09 RX ADMIN — Medication 1 APPLICATION(S): at 06:35

## 2024-05-09 RX ADMIN — OXYCODONE HYDROCHLORIDE 10 MILLIGRAM(S): 5 TABLET ORAL at 04:15

## 2024-05-09 RX ADMIN — LEVETIRACETAM 500 MILLIGRAM(S): 250 TABLET, FILM COATED ORAL at 06:31

## 2024-05-09 RX ADMIN — Medication 1 APPLICATION(S): at 18:53

## 2024-05-09 RX ADMIN — OXYCODONE HYDROCHLORIDE 10 MILLIGRAM(S): 5 TABLET ORAL at 02:43

## 2024-05-09 RX ADMIN — Medication 85 MILLIMOLE(S): at 06:41

## 2024-05-09 RX ADMIN — Medication 650 MILLIGRAM(S): at 00:45

## 2024-05-09 RX ADMIN — Medication 650 MILLIGRAM(S): at 06:31

## 2024-05-09 RX ADMIN — LIDOCAINE 2 PATCH: 4 CREAM TOPICAL at 08:00

## 2024-05-09 RX ADMIN — Medication 650 MILLIGRAM(S): at 07:00

## 2024-05-09 RX ADMIN — OXYCODONE HYDROCHLORIDE 10 MILLIGRAM(S): 5 TABLET ORAL at 11:07

## 2024-05-09 RX ADMIN — SODIUM CHLORIDE 125 MILLILITER(S): 9 INJECTION, SOLUTION INTRAVENOUS at 06:32

## 2024-05-09 RX ADMIN — OXYCODONE HYDROCHLORIDE 10 MILLIGRAM(S): 5 TABLET ORAL at 23:29

## 2024-05-09 RX ADMIN — OXYCODONE HYDROCHLORIDE 10 MILLIGRAM(S): 5 TABLET ORAL at 12:07

## 2024-05-09 RX ADMIN — HYDROMORPHONE HYDROCHLORIDE 0.5 MILLIGRAM(S): 2 INJECTION INTRAMUSCULAR; INTRAVENOUS; SUBCUTANEOUS at 01:40

## 2024-05-09 RX ADMIN — Medication 650 MILLIGRAM(S): at 23:29

## 2024-05-09 RX ADMIN — Medication 650 MILLIGRAM(S): at 17:44

## 2024-05-09 RX ADMIN — SENNA PLUS 2 TABLET(S): 8.6 TABLET ORAL at 23:23

## 2024-05-09 RX ADMIN — LEVETIRACETAM 500 MILLIGRAM(S): 250 TABLET, FILM COATED ORAL at 18:52

## 2024-05-09 RX ADMIN — CHLORHEXIDINE GLUCONATE 1 APPLICATION(S): 213 SOLUTION TOPICAL at 11:07

## 2024-05-09 RX ADMIN — ENOXAPARIN SODIUM 30 MILLIGRAM(S): 100 INJECTION SUBCUTANEOUS at 17:44

## 2024-05-09 RX ADMIN — Medication 1 PACKET(S): at 06:30

## 2024-05-09 RX ADMIN — Medication 650 MILLIGRAM(S): at 12:06

## 2024-05-09 RX ADMIN — Medication 650 MILLIGRAM(S): at 11:06

## 2024-05-09 RX ADMIN — HYDROMORPHONE HYDROCHLORIDE 0.5 MILLIGRAM(S): 2 INJECTION INTRAMUSCULAR; INTRAVENOUS; SUBCUTANEOUS at 00:39

## 2024-05-09 RX ADMIN — LIDOCAINE 2 PATCH: 4 CREAM TOPICAL at 08:15
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
## 2024-05-09 NOTE — DIETITIAN INITIAL EVALUATION ADULT - PERTINENT LABORATORY DATA
05-09    136  |  102  |  8.1  ----------------------------<  98  3.7   |  25.0  |  0.55    Ca    8.0<L>      09 May 2024 04:30  Phos  1.6     05-09  Mg     2.0     05-09    TPro  4.8<L>  /  Alb  2.6<L>  /  TBili  0.4  /  DBili  0.1  /  AST  159<H>  /  ALT  63<H>  /  AlkPhos  44  05-08  POCT Blood Glucose.: 132 mg/dL (05-08-24 @ 14:19)

## 2024-05-09 NOTE — PROGRESS NOTE ADULT - SUBJECTIVE AND OBJECTIVE BOX
SUBJECTIVE / 24H EVENTS:    Pt seen and examined at bedside during morning rounds. Pt found to be sleeping in bed comfortably with a  at bedside who stated he hadn't been sleeping well throughout the night. Pt remained afebrile and hemodynamically stable overnight. Pt did not express any complaints at the time of examination. Pt denies CP, SOB, fever, chills.       MEDICATIONS  (STANDING):  acetaminophen     Tablet .. 650 milliGRAM(s) Oral every 6 hours  bacitracin   Ointment 1 Application(s) Topical two times a day  chlorhexidine 2% Cloths 1 Application(s) Topical daily  enoxaparin Injectable 30 milliGRAM(s) SubCutaneous every 12 hours  levETIRAcetam 500 milliGRAM(s) Oral two times a day  lidocaine   4% Patch 2 Patch Transdermal every 24 hours  senna 2 Tablet(s) Oral at bedtime    MEDICATIONS  (PRN):  HYDROmorphone  Injectable 0.5 milliGRAM(s) IV Push every 4 hours PRN breakthrough  oxyCODONE    IR 10 milliGRAM(s) Oral every 4 hours PRN Severe Pain (7 - 10)  oxyCODONE    IR 5 milliGRAM(s) Oral every 4 hours PRN Moderate Pain (4 - 6)      Vital Signs Last 24 Hrs  T(C): 37.3 (09 May 2024 11:00), Max: 37.9 (08 May 2024 13:00)  T(F): 99.1 (09 May 2024 11:00), Max: 100.2 (08 May 2024 13:00)  HR: 91 (09 May 2024 11:00) (82 - 111)  BP: 124/61 (09 May 2024 11:00) (115/54 - 150/58)  BP(mean): 72 (09 May 2024 11:00) (68 - 90)  RR: 13 (09 May 2024 11:00) (9 - 19)  SpO2: 98% (09 May 2024 11:00) (93% - 98%)    Parameters below as of 09 May 2024 08:00  Patient On (Oxygen Delivery Method): room air    Physical Exam   Constitutional: patient appears comfortable resting in bed, in no apparent distress  HEENT: head normocephalic, repaired forehead laceration   Respiratory: respirations are unlabored, no accessory muscle use, no conversational dyspnea  Cardiovascular: regular rate & rhythm  :  sutures in place without signs of erythema, small distal penile superficial laceration healing well, right testicle elevated into inguinal region  Musculoskeletal: well padded splint is in good position to left upper extremity, right cock up wrist splint in place        I&O's Detail    08 May 2024 07:01  -  09 May 2024 07:00  --------------------------------------------------------  IN:    IV PiggyBack: 583 mL    multiple electrolytes Injection Type 1.: 3075 mL    PRBCs (Packed Red Blood Cells): 300 mL  Total IN: 3958 mL    OUT:    Accordian (mL): 200 mL    Indwelling Catheter - Urethral (mL): 4920 mL  Total OUT: 5120 mL    Total NET: -1162 mL      09 May 2024 07:01  -  09 May 2024 11:19  --------------------------------------------------------  IN:    IV PiggyBack: 333.2 mL    multiple electrolytes Injection Type 1.: 250 mL  Total IN: 583.2 mL    OUT:    Indwelling Catheter - Urethral (mL): 1450 mL  Total OUT: 1450 mL    Total NET: -866.8 mL          LABS:                        8.1    7.68  )-----------( 95       ( 09 May 2024 04:30 )             22.9     05-09    136  |  102  |  8.1  ----------------------------<  98  3.7   |  25.0  |  0.55    Ca    8.0<L>      09 May 2024 04:30  Phos  1.6     05-09  Mg     2.0     05-09    TPro  4.8<L>  /  Alb  2.6<L>  /  TBili  0.4  /  DBili  0.1  /  AST  159<H>  /  ALT  63<H>  /  AlkPhos  44  05-08    PT/INR - ( 07 May 2024 21:00 )   PT: 13.2 sec;   INR: 1.20 ratio         PTT - ( 07 May 2024 21:00 )  PTT:26.0 sec  Urinalysis Basic - ( 09 May 2024 04:30 )    Color: x / Appearance: x / SG: x / pH: x  Gluc: 98 mg/dL / Ketone: x  / Bili: x / Urobili: x   Blood: x / Protein: x / Nitrite: x   Leuk Esterase: x / RBC: x / WBC x   Sq Epi: x / Non Sq Epi: x / Bacteria: x

## 2024-05-09 NOTE — PHYSICAL THERAPY INITIAL EVALUATION ADULT - RANGE OF MOTION EXAMINATION, REHAB EVAL
LUE not tested, LLE limited 2*2 pain, RLE WFL and RUE shoulder/elbow WFL, wrist not tested./Right LE ROM was WFL (within functional limits)/deficits as listed below

## 2024-05-09 NOTE — DIETITIAN INITIAL EVALUATION ADULT - ADD RECOMMEND
Last refill of Vytorin 10-20 mg 4-29-21 #90 with 1 refills  Last refill of Xyzal 5 mg 4-29-21 #90 with 1 refills  Last refill of Jardiance 25 mg 4-29-21 #90 with 1 refills  Last office visit: Annual April 2021  Upcoming appointment: May 2022  Pharmacy: [facility]    
Continue regular diet, assist with feeding as needed  Add Ensure Plus HP BID until PO intake improves (350 kcals, 20 g pro each) - pended for verification   Monitor nutrition related labs and weights
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
## 2024-05-09 NOTE — DIETITIAN INITIAL EVALUATION ADULT - WEIGHT FOR BMI (KG)
Immediate Brief Procedure Note    Patient: Ani Olson    Pre-op Diagnosis: righth hip OA    Post-op Diagnosis: Same    Procedure: Right hip steroid injection    Proceduralist: Esthela Hudson DO    Assistants: none    Anesthesia Staff: No anesthesia staff entered.    Anesthesia Type: local    Findings: severe OA    Estimated Blood Loss: none    Complications: none    Specimens Removed: none  
77.9
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
## 2024-05-09 NOTE — DIETITIAN INITIAL EVALUATION ADULT - NSFNSPHYEXAMSKINFT_GEN_A_CORE
scrotum incision, L hip incision, lower abdomen incision, upper sacrum abrasion, head and chest lacerations

## 2024-05-09 NOTE — PROGRESS NOTE ADULT - SUBJECTIVE AND OBJECTIVE BOX
ORTHOPEDIC POST-OP PROGRESS NOTE:    Name: SOTO FISHER    MR #: [id number]    Procedure: open reduction internal fixation pubic symphysis, perc pinning of sacrum  Surgeon: Yin CLIFTON  DOS: 5/7/24      Pt comfortable pain is controlled with prescribed mediciation. Denies CP, SOB, N/V, numbness/tingling. No acute motor or sensory changes overnight. Patient s/p Left elbow dislocation s/p reduction.  Patient drain clogged, flushed with saline, drain functioning properly.    Patient with Left 2, 3, 4, 5 metacarpal base fracture, trapezium fx, left radial styloid fx, right ulnar styloid fx-wrist/hand fractures being managed by Dr. Mccauley.       ICU Vital Signs Last 24 Hrs  T(C): 36.8 (10 May 2024 08:00), Max: 37.9 (09 May 2024 19:00)  T(F): 98.3 (10 May 2024 08:00), Max: 100.2 (09 May 2024 19:00)  HR: 82 (10 May 2024 08:00) (76 - 99)  BP: 113/71 (10 May 2024 08:00) (102/68 - 148/65)  BP(mean): 68 (09 May 2024 22:30) (57 - 86)  ABP: --  ABP(mean): --  RR: 18 (10 May 2024 08:00) (9 - 18)  SpO2: 99% (10 May 2024 08:00) (96% - 99%)    O2 Parameters below as of 10 May 2024 08:00  Patient On (Oxygen Delivery Method): room air                          8.8    6.07  )-----------( 130      ( 10 May 2024 04:50 )             24.6               General Exam:    05-10    134<L>  |  98  |  8.5  ----------------------------<  105<H>  3.6   |  25.0  |  0.57    Ca    8.4      10 May 2024 04:50  Phos  2.8     05-10  Mg     1.8     05-10    TPro  4.8<L>  /  Alb  2.6<L>  /  TBili  0.4  /  DBili  0.1  /  AST  159<H>  /  ALT  63<H>  /  AlkPhos  44  05-08  General: Pt Alert and oriented, NAD, controlled pain.    RUE: shoulder/elbow NTTP, ROM intact. brace in place to right wrist, finger ROM intact, bcr. SILT. compartments soft and compressible.   LUE: well padded splint in place. shoulder: NTTP, limited ROM 2/2 pain to elbow/wrist. finger ROM limited 2/2 pain. SILT, BCR. edema to hand noted.     pubic mepilex  dressings c/d/i-no drainage or discharge. drain dressing in place-c/d/i. Drain in place, tube patent, cannister under suction-functioning properly-serosang drainage in cannister.     B/L LE:   Skin: No erythema. No wound dehiscence    Pulses: 2+ dorsalis pedis pulse. Cap refill < 2 sec.    Sensation: Grossly intact to light touch without deficit.    Motor: +EHL/FHL/AT/GC    compartments soft and compressible     A/P: 29y Male  POD# 2 s/p open reduction internal fixation pubic symphysis, perc pinning of sacrum    - Pain Control as needed  - DVT ppx   - PT  - Weight bearing status: NWB RUE/LUE, WBAT RLE, TTWB LLE  - Continue to monitor drain output, will check patency daily  - continue rest of care per primary team

## 2024-05-09 NOTE — DIETITIAN INITIAL EVALUATION ADULT - PERTINENT MEDS FT
MEDICATIONS  (STANDING):  enoxaparin Injectable 30 milliGRAM(s) SubCutaneous every 12 hours  levETIRAcetam 500 milliGRAM(s) Oral two times a day  senna 2 Tablet(s) Oral at bedtime    MEDICATIONS  (PRN):  oxyCODONE    IR 10 milliGRAM(s) Oral every 4 hours PRN Severe Pain (7 - 10)

## 2024-05-09 NOTE — PHYSICAL THERAPY INITIAL EVALUATION ADULT - LEVEL OF INDEPENDENCE: SUPINE/SIT, REHAB EVAL
supine to long Sit only, Pt unable to tolerated further mobility/maximum assist (25% patients effort)

## 2024-05-09 NOTE — PHYSICAL THERAPY INITIAL EVALUATION ADULT - SENSORY TESTS
(+) eye tracking bilaterally in all directions; Unable to test finger to thumb coordination Martinez

## 2024-05-10 LAB
ANION GAP SERPL CALC-SCNC: 11 MMOL/L — SIGNIFICANT CHANGE UP (ref 5–17)
BUN SERPL-MCNC: 8.5 MG/DL — SIGNIFICANT CHANGE UP (ref 8–20)
CALCIUM SERPL-MCNC: 8.4 MG/DL — SIGNIFICANT CHANGE UP (ref 8.4–10.5)
CHLORIDE SERPL-SCNC: 98 MMOL/L — SIGNIFICANT CHANGE UP (ref 96–108)
CK MB CFR SERPL CALC: 9.9 NG/ML — HIGH (ref 0–6.7)
CK SERPL-CCNC: 4567 U/L — HIGH (ref 30–200)
CO2 SERPL-SCNC: 25 MMOL/L — SIGNIFICANT CHANGE UP (ref 22–29)
CREAT SERPL-MCNC: 0.57 MG/DL — SIGNIFICANT CHANGE UP (ref 0.5–1.3)
EGFR: 137 ML/MIN/1.73M2 — SIGNIFICANT CHANGE UP
GLUCOSE SERPL-MCNC: 105 MG/DL — HIGH (ref 70–99)
HCT VFR BLD CALC: 24.6 % — LOW (ref 39–50)
HGB BLD-MCNC: 8.8 G/DL — LOW (ref 13–17)
MAGNESIUM SERPL-MCNC: 1.8 MG/DL — SIGNIFICANT CHANGE UP (ref 1.6–2.6)
MCHC RBC-ENTMCNC: 31.2 PG — SIGNIFICANT CHANGE UP (ref 27–34)
MCHC RBC-ENTMCNC: 35.8 GM/DL — SIGNIFICANT CHANGE UP (ref 32–36)
MCV RBC AUTO: 87.2 FL — SIGNIFICANT CHANGE UP (ref 80–100)
PHOSPHATE SERPL-MCNC: 2.8 MG/DL — SIGNIFICANT CHANGE UP (ref 2.4–4.7)
PLATELET # BLD AUTO: 130 K/UL — LOW (ref 150–400)
POTASSIUM SERPL-MCNC: 3.6 MMOL/L — SIGNIFICANT CHANGE UP (ref 3.5–5.3)
POTASSIUM SERPL-SCNC: 3.6 MMOL/L — SIGNIFICANT CHANGE UP (ref 3.5–5.3)
RBC # BLD: 2.82 M/UL — LOW (ref 4.2–5.8)
RBC # FLD: 13 % — SIGNIFICANT CHANGE UP (ref 10.3–14.5)
SODIUM SERPL-SCNC: 134 MMOL/L — LOW (ref 135–145)
WBC # BLD: 6.07 K/UL — SIGNIFICANT CHANGE UP (ref 3.8–10.5)
WBC # FLD AUTO: 6.07 K/UL — SIGNIFICANT CHANGE UP (ref 3.8–10.5)

## 2024-05-10 PROCEDURE — 99233 SBSQ HOSP IP/OBS HIGH 50: CPT | Mod: GC

## 2024-05-10 RX ORDER — CELECOXIB 200 MG/1
200 CAPSULE ORAL ONCE
Refills: 0 | Status: DISCONTINUED | OUTPATIENT
Start: 2024-05-12 | End: 2024-05-14

## 2024-05-10 RX ORDER — APREPITANT 80 MG/1
40 CAPSULE ORAL ONCE
Refills: 0 | Status: DISCONTINUED | OUTPATIENT
Start: 2024-05-12 | End: 2024-05-14

## 2024-05-10 RX ORDER — LANOLIN ALCOHOL/MO/W.PET/CERES
5 CREAM (GRAM) TOPICAL AT BEDTIME
Refills: 0 | Status: DISCONTINUED | OUTPATIENT
Start: 2024-05-10 | End: 2024-05-10

## 2024-05-10 RX ORDER — POVIDONE-IODINE 5 %
1 AEROSOL (ML) TOPICAL ONCE
Refills: 0 | Status: COMPLETED | OUTPATIENT
Start: 2024-05-10 | End: 2024-05-14

## 2024-05-10 RX ORDER — MAGNESIUM SULFATE 500 MG/ML
2 VIAL (ML) INJECTION ONCE
Refills: 0 | Status: COMPLETED | OUTPATIENT
Start: 2024-05-10 | End: 2024-05-10

## 2024-05-10 RX ORDER — VANCOMYCIN HCL 1 G
1000 VIAL (EA) INTRAVENOUS ONCE
Refills: 0 | Status: DISCONTINUED | OUTPATIENT
Start: 2024-05-12 | End: 2024-05-14

## 2024-05-10 RX ORDER — MUPIROCIN 20 MG/G
1 OINTMENT TOPICAL
Refills: 0 | Status: DISCONTINUED | OUTPATIENT
Start: 2024-05-10 | End: 2024-05-14

## 2024-05-10 RX ORDER — POTASSIUM CHLORIDE 20 MEQ
40 PACKET (EA) ORAL ONCE
Refills: 0 | Status: COMPLETED | OUTPATIENT
Start: 2024-05-10 | End: 2024-05-10

## 2024-05-10 RX ORDER — ONDANSETRON 8 MG/1
4 TABLET, FILM COATED ORAL ONCE
Refills: 0 | Status: COMPLETED | OUTPATIENT
Start: 2024-05-10 | End: 2024-05-10

## 2024-05-10 RX ORDER — CEFAZOLIN SODIUM 1 G
2000 VIAL (EA) INJECTION ONCE
Refills: 0 | Status: DISCONTINUED | OUTPATIENT
Start: 2024-05-12 | End: 2024-05-14

## 2024-05-10 RX ORDER — CHLORHEXIDINE GLUCONATE 213 G/1000ML
1 SOLUTION TOPICAL EVERY 12 HOURS
Refills: 0 | Status: COMPLETED | OUTPATIENT
Start: 2024-05-10 | End: 2024-05-11

## 2024-05-10 RX ADMIN — Medication 650 MILLIGRAM(S): at 06:07

## 2024-05-10 RX ADMIN — Medication 650 MILLIGRAM(S): at 00:20

## 2024-05-10 RX ADMIN — HYDROMORPHONE HYDROCHLORIDE 0.5 MILLIGRAM(S): 2 INJECTION INTRAMUSCULAR; INTRAVENOUS; SUBCUTANEOUS at 23:44

## 2024-05-10 RX ADMIN — Medication 650 MILLIGRAM(S): at 17:45

## 2024-05-10 RX ADMIN — HYDROMORPHONE HYDROCHLORIDE 0.5 MILLIGRAM(S): 2 INJECTION INTRAMUSCULAR; INTRAVENOUS; SUBCUTANEOUS at 22:22

## 2024-05-10 RX ADMIN — MUPIROCIN 1 APPLICATION(S): 20 OINTMENT TOPICAL at 17:46

## 2024-05-10 RX ADMIN — OXYCODONE HYDROCHLORIDE 10 MILLIGRAM(S): 5 TABLET ORAL at 21:15

## 2024-05-10 RX ADMIN — LEVETIRACETAM 500 MILLIGRAM(S): 250 TABLET, FILM COATED ORAL at 06:07

## 2024-05-10 RX ADMIN — Medication 650 MILLIGRAM(S): at 12:22

## 2024-05-10 RX ADMIN — SENNA PLUS 2 TABLET(S): 8.6 TABLET ORAL at 22:22

## 2024-05-10 RX ADMIN — LEVETIRACETAM 500 MILLIGRAM(S): 250 TABLET, FILM COATED ORAL at 17:45

## 2024-05-10 RX ADMIN — ENOXAPARIN SODIUM 30 MILLIGRAM(S): 100 INJECTION SUBCUTANEOUS at 06:07

## 2024-05-10 RX ADMIN — Medication 40 MILLIEQUIVALENT(S): at 11:21

## 2024-05-10 RX ADMIN — Medication 650 MILLIGRAM(S): at 11:22

## 2024-05-10 RX ADMIN — LIDOCAINE 2 PATCH: 4 CREAM TOPICAL at 20:38

## 2024-05-10 RX ADMIN — Medication 650 MILLIGRAM(S): at 18:45

## 2024-05-10 RX ADMIN — OXYCODONE HYDROCHLORIDE 10 MILLIGRAM(S): 5 TABLET ORAL at 15:22

## 2024-05-10 RX ADMIN — Medication 1 APPLICATION(S): at 22:23

## 2024-05-10 RX ADMIN — OXYCODONE HYDROCHLORIDE 10 MILLIGRAM(S): 5 TABLET ORAL at 06:07

## 2024-05-10 RX ADMIN — ENOXAPARIN SODIUM 30 MILLIGRAM(S): 100 INJECTION SUBCUTANEOUS at 17:46

## 2024-05-10 RX ADMIN — Medication 650 MILLIGRAM(S): at 23:49

## 2024-05-10 RX ADMIN — Medication 25 GRAM(S): at 09:00

## 2024-05-10 RX ADMIN — OXYCODONE HYDROCHLORIDE 10 MILLIGRAM(S): 5 TABLET ORAL at 00:20

## 2024-05-10 RX ADMIN — Medication 1 APPLICATION(S): at 05:32

## 2024-05-10 RX ADMIN — CHLORHEXIDINE GLUCONATE 1 APPLICATION(S): 213 SOLUTION TOPICAL at 17:46

## 2024-05-10 RX ADMIN — OXYCODONE HYDROCHLORIDE 10 MILLIGRAM(S): 5 TABLET ORAL at 20:30

## 2024-05-10 RX ADMIN — Medication 1 DROP(S): at 21:33

## 2024-05-10 NOTE — PROGRESS NOTE ADULT - SUBJECTIVE AND OBJECTIVE BOX
Patient seen and examined at bedside.     No acute events overnight.     Vitals:  Vital Signs Last 24 Hrs  T(C): 37.4 (10 May 2024 06:00), Max: 37.9 (09 May 2024 19:00)  T(F): 99.4 (10 May 2024 06:00), Max: 100.2 (09 May 2024 19:00)  HR: 90 (10 May 2024 06:00) (76 - 99)  BP: 102/68 (10 May 2024 06:00) (102/68 - 148/65)  BP(mean): 68 (09 May 2024 22:30) (57 - 88)  RR: 18 (10 May 2024 06:00) (9 - 18)  SpO2: 99% (10 May 2024 06:00) (96% - 99%)    Parameters below as of 10 May 2024 06:00  Patient On (Oxygen Delivery Method): room air    Labs:  05-10    134<L>  |  98  |  8.5  ----------------------------<  105<H>  3.6   |  25.0  |  0.57    Ca    8.4      10 May 2024 04:50  Phos  2.8     05-10  Mg     1.8     05-10    TPro  4.8<L>  /  Alb  2.6<L>  /  TBili  0.4  /  DBili  0.1  /  AST  159<H>  /  ALT  63<H>  /  AlkPhos  44  05-08                 8.8    6.07  )-----------( 130      ( 10 May 2024 04:50 )             24.6       Exam:  Gen: pt lying in bed, alert, in NAD  HEENT: C-collar in place   Resp: unlabored breathing on room air   CVS: RRR  Abd: soft, ND with no guarding or rebound tenderness.  Ext: Bilateral upper extremity stent with sensation intact

## 2024-05-10 NOTE — PROGRESS NOTE ADULT - SUBJECTIVE AND OBJECTIVE BOX
ORTHOPEDIC POST-OP PROGRESS NOTE:    Name: SOTO FISHER    MR #: [id number]    Patient s/p trauma-Patient with Left 2, 3, 4, 5 metacarpal base fracture, trapezium fx, left radial styloid fx, right ulnar styloid fx-wrist/hand fractures being managed by Dr. Mccauley.     Patient s/p open reduction internal fixation pubic symphysis, perc pinning of sacrum with Dr. Esqueda on 5/7/24      Pt comfortable pain is controlled with prescribed mediciation. Denies CP, SOB, N/V, numbness/tingling. No acute motor or sensory changes overnight. Patient s/p Left elbow dislocation s/p reduction.       Vital Signs Last 24 Hrs  T(C): 37.4 (05-10-24 @ 06:00), Max: 37.4 (05-10-24 @ 06:00)  T(F): 99.4 (05-10-24 @ 06:00), Max: 99.4 (05-10-24 @ 06:00)  HR: 90 (05-10-24 @ 06:00) (90 - 90)  BP: 102/68 (05-10-24 @ 06:00) (102/68 - 102/68)  BP(mean): --  RR: 18 (05-10-24 @ 06:00) (18 - 18)  SpO2: 99% (05-10-24 @ 06:00) (99% - 99%)      General Exam:    General: Pt Alert and oriented, NAD, controlled pain.    RUE: shoulder/elbow NTTP, ROM intact. brace in place to right wrist, finger ROM intact, bcr. SILT. compartments soft and compressible.   LUE: well padded splint in place. shoulder: NTTP, limited ROM 2/2 pain to elbow/wrist. finger ROM limited 2/2 pain. SILT, BCR. edema to hand noted.     pubic mepilex  dressings c/d/i-no drainage or discharge. drain dressing in place-c/d/i. Drain in place, tube patent, cannister under suction-functioning properly-serosang drainage in cannister.     B/L LE:   Skin: No erythema. No wound dehiscence    Pulses: 2+ dorsalis pedis pulse. Cap refill < 2 sec.    Sensation: Grossly intact to light touch without deficit.    Motor: +EHL/FHL/AT/GC    compartments soft and compressible     A/P: 29y Male with Left 2, 3, 4, 5 metacarpal base fracture, trapezium fx, left radial styloid fx, right ulnar styloid fx-wrist/hand fractures being managed by Dr. Mccauley. patient POD# 3 s/p open reduction internal fixation pubic symphysis, perc pinning of sacrum    - Pain Control as needed  - DVT ppx   - PT  - Weight bearing status: NWB RUE/LUE, WBAT RLE, TTWB LLE  - plan for OR with Dr. Mccauley Sunday for hand injuries  - continue rest of care per primary team
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
## 2024-05-10 NOTE — PROGRESS NOTE ADULT - NS ATTEND AMEND GEN_ALL_CORE FT
Patient seen and examined at bedside. No acute events overnight. Denies any nausea or vomiting. Pain is well controlled. Afebrile. Patient is planned for operative intervention with hand on 5/12. F/U PT/OT
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
## 2024-05-10 NOTE — PROGRESS NOTE ADULT - SUBJECTIVE AND OBJECTIVE BOX
ORTHOPEDIC POST-OP PROGRESS NOTE:    Name: SOTO FISHER    MR #: [id number]    Procedure: open reduction internal fixation pubic symphysis, perc pinning of sacrum  Surgeon: Yin CLIFTON  DOS: 5/7/24      Pt comfortable pain is controlled with prescribed mediciation. Denies CP, SOB, N/V, numbness/tingling. No acute motor or sensory changes overnight. Patient s/p Left elbow dislocation s/p reduction.     Patient with Left 2, 3, 4, 5 metacarpal base fracture, trapezium fx, left radial styloid fx, right ulnar styloid fx-wrist/hand fractures being managed by Dr. Mccauley.       Vital Signs Last 24 Hrs  T(C): 37.4 (05-10-24 @ 06:00), Max: 37.4 (05-10-24 @ 06:00)  T(F): 99.4 (05-10-24 @ 06:00), Max: 99.4 (05-10-24 @ 06:00)  HR: 90 (05-10-24 @ 06:00) (90 - 90)  BP: 102/68 (05-10-24 @ 06:00) (102/68 - 102/68)  BP(mean): --  RR: 18 (05-10-24 @ 06:00) (18 - 18)  SpO2: 99% (05-10-24 @ 06:00) (99% - 99%)      General Exam:    General: Pt Alert and oriented, NAD, controlled pain.    RUE: shoulder/elbow NTTP, ROM intact. brace in place to right wrist, finger ROM intact, bcr. SILT. compartments soft and compressible.   LUE: well padded splint in place. shoulder: NTTP, limited ROM 2/2 pain to elbow/wrist. finger ROM limited 2/2 pain. SILT, BCR. edema to hand noted.     pubic mepilex  dressings c/d/i-no drainage or discharge. drain dressing in place-c/d/i. Drain in place, tube patent, cannister under suction-functioning properly-serosang drainage in cannister.     B/L LE:   Skin: No erythema. No wound dehiscence    Pulses: 2+ dorsalis pedis pulse. Cap refill < 2 sec.    Sensation: Grossly intact to light touch without deficit.    Motor: +EHL/FHL/AT/GC    compartments soft and compressible     A/P: 29y Male  POD# 3 s/p open reduction internal fixation pubic symphysis, perc pinning of sacrum    - Pain Control as needed  - DVT ppx   - PT  - Weight bearing status: NWB RUE/LUE, WBAT RLE, TTWB LLE  - plan for OR with Dr. Mccauley Angelo for hand injuries  - continue rest of care per primary team
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
## 2024-05-11 LAB
ANION GAP SERPL CALC-SCNC: 12 MMOL/L — SIGNIFICANT CHANGE UP (ref 5–17)
BUN SERPL-MCNC: 10.3 MG/DL — SIGNIFICANT CHANGE UP (ref 8–20)
CALCIUM SERPL-MCNC: 8.9 MG/DL — SIGNIFICANT CHANGE UP (ref 8.4–10.5)
CHLORIDE SERPL-SCNC: 97 MMOL/L — SIGNIFICANT CHANGE UP (ref 96–108)
CO2 SERPL-SCNC: 25 MMOL/L — SIGNIFICANT CHANGE UP (ref 22–29)
CREAT SERPL-MCNC: 0.51 MG/DL — SIGNIFICANT CHANGE UP (ref 0.5–1.3)
EGFR: 143 ML/MIN/1.73M2 — SIGNIFICANT CHANGE UP
GLUCOSE SERPL-MCNC: 108 MG/DL — HIGH (ref 70–99)
MAGNESIUM SERPL-MCNC: 1.8 MG/DL — SIGNIFICANT CHANGE UP (ref 1.6–2.6)
MRSA PCR RESULT.: SIGNIFICANT CHANGE UP
PHOSPHATE SERPL-MCNC: 3.3 MG/DL — SIGNIFICANT CHANGE UP (ref 2.4–4.7)
POTASSIUM SERPL-MCNC: 4 MMOL/L — SIGNIFICANT CHANGE UP (ref 3.5–5.3)
POTASSIUM SERPL-SCNC: 4 MMOL/L — SIGNIFICANT CHANGE UP (ref 3.5–5.3)
S AUREUS DNA NOSE QL NAA+PROBE: SIGNIFICANT CHANGE UP
SODIUM SERPL-SCNC: 134 MMOL/L — LOW (ref 135–145)

## 2024-05-11 PROCEDURE — 99231 SBSQ HOSP IP/OBS SF/LOW 25: CPT | Mod: GC

## 2024-05-11 RX ORDER — SODIUM CHLORIDE 0.65 %
1 AEROSOL, SPRAY (ML) NASAL
Refills: 0 | Status: DISCONTINUED | OUTPATIENT
Start: 2024-05-11 | End: 2024-05-14

## 2024-05-11 RX ORDER — MAGNESIUM SULFATE 500 MG/ML
2 VIAL (ML) INJECTION ONCE
Refills: 0 | Status: COMPLETED | OUTPATIENT
Start: 2024-05-11 | End: 2024-05-11

## 2024-05-11 RX ORDER — SODIUM CHLORIDE 9 MG/ML
1000 INJECTION, SOLUTION INTRAVENOUS
Refills: 0 | Status: DISCONTINUED | OUTPATIENT
Start: 2024-05-11 | End: 2024-05-13

## 2024-05-11 RX ORDER — OXYMETAZOLINE HYDROCHLORIDE 0.5 MG/ML
2 SPRAY NASAL ONCE
Refills: 0 | Status: COMPLETED | OUTPATIENT
Start: 2024-05-11 | End: 2024-05-11

## 2024-05-11 RX ORDER — BENZOCAINE 10 %
2 GEL (GRAM) MUCOUS MEMBRANE ONCE
Refills: 0 | Status: COMPLETED | OUTPATIENT
Start: 2024-05-11 | End: 2024-05-11

## 2024-05-11 RX ORDER — ENOXAPARIN SODIUM 100 MG/ML
40 INJECTION SUBCUTANEOUS EVERY 12 HOURS
Refills: 0 | Status: DISCONTINUED | OUTPATIENT
Start: 2024-05-11 | End: 2024-05-11

## 2024-05-11 RX ADMIN — Medication 650 MILLIGRAM(S): at 06:21

## 2024-05-11 RX ADMIN — ENOXAPARIN SODIUM 30 MILLIGRAM(S): 100 INJECTION SUBCUTANEOUS at 05:30

## 2024-05-11 RX ADMIN — SODIUM CHLORIDE 75 MILLILITER(S): 9 INJECTION, SOLUTION INTRAVENOUS at 21:26

## 2024-05-11 RX ADMIN — OXYCODONE HYDROCHLORIDE 10 MILLIGRAM(S): 5 TABLET ORAL at 22:27

## 2024-05-11 RX ADMIN — OXYCODONE HYDROCHLORIDE 10 MILLIGRAM(S): 5 TABLET ORAL at 05:25

## 2024-05-11 RX ADMIN — OXYCODONE HYDROCHLORIDE 10 MILLIGRAM(S): 5 TABLET ORAL at 21:27

## 2024-05-11 RX ADMIN — MUPIROCIN 1 APPLICATION(S): 20 OINTMENT TOPICAL at 17:15

## 2024-05-11 RX ADMIN — OXYCODONE HYDROCHLORIDE 10 MILLIGRAM(S): 5 TABLET ORAL at 04:01

## 2024-05-11 RX ADMIN — Medication 5 MILLIGRAM(S): at 21:27

## 2024-05-11 RX ADMIN — OXYCODONE HYDROCHLORIDE 10 MILLIGRAM(S): 5 TABLET ORAL at 09:33

## 2024-05-11 RX ADMIN — Medication 25 GRAM(S): at 09:30

## 2024-05-11 RX ADMIN — OXYMETAZOLINE HYDROCHLORIDE 2 SPRAY(S): 0.5 SPRAY NASAL at 13:41

## 2024-05-11 RX ADMIN — Medication 650 MILLIGRAM(S): at 23:36

## 2024-05-11 RX ADMIN — Medication 2 SPRAY(S): at 14:04

## 2024-05-11 RX ADMIN — Medication 1 APPLICATION(S): at 05:30

## 2024-05-11 RX ADMIN — OXYCODONE HYDROCHLORIDE 10 MILLIGRAM(S): 5 TABLET ORAL at 14:05

## 2024-05-11 RX ADMIN — SENNA PLUS 2 TABLET(S): 8.6 TABLET ORAL at 21:27

## 2024-05-11 RX ADMIN — Medication 650 MILLIGRAM(S): at 05:30

## 2024-05-11 RX ADMIN — Medication 1 DROP(S): at 21:26

## 2024-05-11 RX ADMIN — Medication 650 MILLIGRAM(S): at 00:30

## 2024-05-11 RX ADMIN — MUPIROCIN 1 APPLICATION(S): 20 OINTMENT TOPICAL at 05:30

## 2024-05-11 RX ADMIN — LEVETIRACETAM 500 MILLIGRAM(S): 250 TABLET, FILM COATED ORAL at 05:30

## 2024-05-11 RX ADMIN — Medication 650 MILLIGRAM(S): at 12:16

## 2024-05-11 RX ADMIN — Medication 650 MILLIGRAM(S): at 13:40

## 2024-05-11 RX ADMIN — OXYCODONE HYDROCHLORIDE 10 MILLIGRAM(S): 5 TABLET ORAL at 15:00

## 2024-05-11 RX ADMIN — Medication 1 DROP(S): at 17:14

## 2024-05-11 RX ADMIN — Medication 1 APPLICATION(S): at 17:15

## 2024-05-11 RX ADMIN — Medication 650 MILLIGRAM(S): at 17:14

## 2024-05-11 RX ADMIN — OXYCODONE HYDROCHLORIDE 10 MILLIGRAM(S): 5 TABLET ORAL at 10:59

## 2024-05-11 RX ADMIN — LEVETIRACETAM 500 MILLIGRAM(S): 250 TABLET, FILM COATED ORAL at 17:14

## 2024-05-11 RX ADMIN — Medication 1 DROP(S): at 09:30

## 2024-05-11 RX ADMIN — Medication 1 SPRAY(S): at 17:14

## 2024-05-11 RX ADMIN — ENOXAPARIN SODIUM 40 MILLIGRAM(S): 100 INJECTION SUBCUTANEOUS at 17:15

## 2024-05-11 RX ADMIN — Medication 1 DROP(S): at 04:02

## 2024-05-11 RX ADMIN — CHLORHEXIDINE GLUCONATE 1 APPLICATION(S): 213 SOLUTION TOPICAL at 05:40

## 2024-05-11 RX ADMIN — Medication 650 MILLIGRAM(S): at 18:15

## 2024-05-11 RX ADMIN — SODIUM CHLORIDE 75 MILLILITER(S): 9 INJECTION, SOLUTION INTRAVENOUS at 06:25
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
## 2024-05-11 NOTE — PROGRESS NOTE ADULT - SUBJECTIVE AND OBJECTIVE BOX
Subjective: Patient seen at bedside, no current complaints, no overnight events, denies n/v/cp/sob.    MEDICATIONS  (STANDING):  acetaminophen     Tablet .. 650 milliGRAM(s) Oral every 6 hours  artificial  tears Solution 1 Drop(s) Both EYES four times a day  bacitracin   Ointment 1 Application(s) Topical two times a day  chlorhexidine 2% Cloths 1 Application(s) Topical every 12 hours  enoxaparin Injectable 30 milliGRAM(s) SubCutaneous every 12 hours  levETIRAcetam 500 milliGRAM(s) Oral two times a day  lidocaine   4% Patch 2 Patch Transdermal every 24 hours  melatonin 5 milliGRAM(s) Oral at bedtime  mupirocin 2% Ointment 1 Application(s) Both Nostrils two times a day  povidone iodine 5% Nasal Swab 1 Application(s) Both Nostrils once  senna 2 Tablet(s) Oral at bedtime    MEDICATIONS  (PRN):  HYDROmorphone  Injectable 0.5 milliGRAM(s) IV Push every 4 hours PRN breakthrough  oxyCODONE    IR 5 milliGRAM(s) Oral every 4 hours PRN Moderate Pain (4 - 6)  oxyCODONE    IR 10 milliGRAM(s) Oral every 4 hours PRN Severe Pain (7 - 10)      Vital Signs Last 24 Hrs  T(C): 37.2 (11 May 2024 00:00), Max: 37.4 (10 May 2024 06:00)  T(F): 98.9 (11 May 2024 00:00), Max: 99.4 (10 May 2024 06:00)  HR: 75 (11 May 2024 00:00) (75 - 90)  BP: 118/70 (11 May 2024 00:00) (102/68 - 145/80)  BP(mean): --  RR: 18 (11 May 2024 00:00) (16 - 18)  SpO2: 99% (11 May 2024 00:00) (98% - 99%)    Parameters below as of 11 May 2024 00:00  Patient On (Oxygen Delivery Method): room air        Physical Exam:    Constitutional: NAD  HEENT:  Scalp staples, healing well, EOMI, PERRLA,   ABDOMEN:  Soft, Nontender, Nondistended.  Lt flank ecchymosis soft, extending to left thigh, soft  EXTREMITIES:   LUE in splint, Rt wrist in splint.    SKIN: Scattered abrasions throughout extremities and head.    LABS:                        8.8    6.07  )-----------( 130      ( 10 May 2024 04:50 )             24.6     05-10    134<L>  |  98  |  8.5  ----------------------------<  105<H>  3.6   |  25.0  |  0.57    Ca    8.4      10 May 2024 04:50  Phos  2.8     05-10  Mg     1.8     05-10        Urinalysis Basic - ( 10 May 2024 04:50 )    Color: x / Appearance: x / SG: x / pH: x  Gluc: 105 mg/dL / Ketone: x  / Bili: x / Urobili: x   Blood: x / Protein: x / Nitrite: x   Leuk Esterase: x / RBC: x / WBC x   Sq Epi: x / Non Sq Epi: x / Bacteria: x        A:

## 2024-05-11 NOTE — PROGRESS NOTE ADULT - SUBJECTIVE AND OBJECTIVE BOX
ORTHOPEDIC POST-OP PROGRESS NOTE:    Name: SOTO FISHER    MR #: [id number]    Procedure: open reduction internal fixation pubic symphysis, perc pinning of sacrum  Surgeon: Yin CLIFTON  DOS: 5/7/24      Pt comfortable pain is controlled with prescribed medication Denies CP, SOB, N/V, numbness/tingling. No acute motor or sensory changes overnight. Patient s/p Left elbow dislocation s/p reduction.     Patient with Left 2, 3, 4, 5 metacarpal base fracture, trapezium fx, left radial styloid fx, right ulnar styloid fx-wrist/hand fractures being managed by Dr. Mccauley.     ICU Vital Signs Last 24 Hrs  T(C): 36.9 (11 May 2024 08:56), Max: 37.2 (10 May 2024 20:30)  T(F): 98.4 (11 May 2024 08:56), Max: 99 (10 May 2024 20:30)  HR: 75 (11 May 2024 08:56) (75 - 87)  BP: 116/74 (11 May 2024 08:56) (116/74 - 145/80)  BP(mean): --  ABP: 118/70 (11 May 2024 00:00) (118/70 - 118/70)  ABP(mean): --  RR: 14 (11 May 2024 08:56) (14 - 18)  SpO2: 99% (11 May 2024 08:56) (98% - 99%)    O2 Parameters below as of 11 May 2024 08:56  Patient On (Oxygen Delivery Method): room air            General Exam:    General: Pt Alert and oriented, NAD, controlled pain.    RUE: shoulder/elbow NTTP, ROM intact. brace in place to right wrist, finger ROM intact, bcr. SILT. compartments soft and compressible.   LUE: well padded splint in place. shoulder: NTTP, limited ROM 2/2 pain to elbow/wrist. finger ROM limited 2/2 pain. SILT, BCR. edema to hand noted.     pubic mepilex  dressings c/d/i-no drainage or discharge. drain dressing in place-c/d/i. Drain in place, tube patent, cannister under suction-functioning properly-serosang drainage in cannister.     B/L LE:   Skin: No erythema. No wound dehiscence    Pulses: 2+ dorsalis pedis pulse. Cap refill < 2 sec.    Sensation: Grossly intact to light touch without deficit.    Motor: +EHL/FHL/AT/GC    compartments soft and compressible     A/P: 29y Male  POD# 4 s/p open reduction internal fixation pubic symphysis, perc pinning of sacrum    - Pain Control as needed  - DVT ppx   - PT  - Weight bearing status: NWB RUE/LUE, WBAT RLE, TTWB LLE  - plan for OR with Dr. Mccauley Sunday for hand injuries  - continue rest of care per primary team
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
## 2024-05-12 LAB
ALBUMIN SERPL ELPH-MCNC: 3.3 G/DL — SIGNIFICANT CHANGE UP (ref 3.3–5.2)
ALP SERPL-CCNC: 98 U/L — SIGNIFICANT CHANGE UP (ref 40–120)
ALT FLD-CCNC: 127 U/L — HIGH
ANION GAP SERPL CALC-SCNC: 12 MMOL/L — SIGNIFICANT CHANGE UP (ref 5–17)
APTT BLD: 31.2 SEC — SIGNIFICANT CHANGE UP (ref 24.5–35.6)
AST SERPL-CCNC: 104 U/L — HIGH
BASOPHILS # BLD AUTO: 0.02 K/UL — SIGNIFICANT CHANGE UP (ref 0–0.2)
BASOPHILS NFR BLD AUTO: 0.2 % — SIGNIFICANT CHANGE UP (ref 0–2)
BILIRUB SERPL-MCNC: 1 MG/DL — SIGNIFICANT CHANGE UP (ref 0.4–2)
BUN SERPL-MCNC: 10.6 MG/DL — SIGNIFICANT CHANGE UP (ref 8–20)
CALCIUM SERPL-MCNC: 8.6 MG/DL — SIGNIFICANT CHANGE UP (ref 8.4–10.5)
CHLORIDE SERPL-SCNC: 95 MMOL/L — LOW (ref 96–108)
CO2 SERPL-SCNC: 25 MMOL/L — SIGNIFICANT CHANGE UP (ref 22–29)
CREAT SERPL-MCNC: 0.48 MG/DL — LOW (ref 0.5–1.3)
EGFR: 145 ML/MIN/1.73M2 — SIGNIFICANT CHANGE UP
EOSINOPHIL # BLD AUTO: 0.26 K/UL — SIGNIFICANT CHANGE UP (ref 0–0.5)
EOSINOPHIL NFR BLD AUTO: 2.9 % — SIGNIFICANT CHANGE UP (ref 0–6)
GLUCOSE SERPL-MCNC: 106 MG/DL — HIGH (ref 70–99)
HCT VFR BLD CALC: 26.3 % — LOW (ref 39–50)
HGB BLD-MCNC: 9.4 G/DL — LOW (ref 13–17)
IMM GRANULOCYTES NFR BLD AUTO: 1.7 % — HIGH (ref 0–0.9)
INR BLD: 1.13 RATIO — SIGNIFICANT CHANGE UP (ref 0.85–1.18)
LYMPHOCYTES # BLD AUTO: 1.76 K/UL — SIGNIFICANT CHANGE UP (ref 1–3.3)
LYMPHOCYTES # BLD AUTO: 19.5 % — SIGNIFICANT CHANGE UP (ref 13–44)
MAGNESIUM SERPL-MCNC: 1.7 MG/DL — SIGNIFICANT CHANGE UP (ref 1.6–2.6)
MCHC RBC-ENTMCNC: 31.3 PG — SIGNIFICANT CHANGE UP (ref 27–34)
MCHC RBC-ENTMCNC: 35.7 GM/DL — SIGNIFICANT CHANGE UP (ref 32–36)
MCV RBC AUTO: 87.7 FL — SIGNIFICANT CHANGE UP (ref 80–100)
MONOCYTES # BLD AUTO: 0.82 K/UL — SIGNIFICANT CHANGE UP (ref 0–0.9)
MONOCYTES NFR BLD AUTO: 9.1 % — SIGNIFICANT CHANGE UP (ref 2–14)
NEUTROPHILS # BLD AUTO: 6.03 K/UL — SIGNIFICANT CHANGE UP (ref 1.8–7.4)
NEUTROPHILS NFR BLD AUTO: 66.6 % — SIGNIFICANT CHANGE UP (ref 43–77)
PHOSPHATE SERPL-MCNC: 3.6 MG/DL — SIGNIFICANT CHANGE UP (ref 2.4–4.7)
PLATELET # BLD AUTO: 233 K/UL — SIGNIFICANT CHANGE UP (ref 150–400)
POTASSIUM SERPL-MCNC: 4.2 MMOL/L — SIGNIFICANT CHANGE UP (ref 3.5–5.3)
POTASSIUM SERPL-SCNC: 4.2 MMOL/L — SIGNIFICANT CHANGE UP (ref 3.5–5.3)
PROT SERPL-MCNC: 5.9 G/DL — LOW (ref 6.6–8.7)
PROTHROM AB SERPL-ACNC: 12.5 SEC — SIGNIFICANT CHANGE UP (ref 9.5–13)
RBC # BLD: 3 M/UL — LOW (ref 4.2–5.8)
RBC # FLD: 13 % — SIGNIFICANT CHANGE UP (ref 10.3–14.5)
SODIUM SERPL-SCNC: 132 MMOL/L — LOW (ref 135–145)
WBC # BLD: 9.04 K/UL — SIGNIFICANT CHANGE UP (ref 3.8–10.5)
WBC # FLD AUTO: 9.04 K/UL — SIGNIFICANT CHANGE UP (ref 3.8–10.5)

## 2024-05-12 PROCEDURE — 99231 SBSQ HOSP IP/OBS SF/LOW 25: CPT

## 2024-05-12 RX ORDER — MAGNESIUM SULFATE 500 MG/ML
2 VIAL (ML) INJECTION ONCE
Refills: 0 | Status: COMPLETED | OUTPATIENT
Start: 2024-05-12 | End: 2024-05-12

## 2024-05-12 RX ADMIN — Medication 1 DROP(S): at 12:05

## 2024-05-12 RX ADMIN — OXYCODONE HYDROCHLORIDE 10 MILLIGRAM(S): 5 TABLET ORAL at 18:57

## 2024-05-12 RX ADMIN — Medication 1 DROP(S): at 05:59

## 2024-05-12 RX ADMIN — OXYCODONE HYDROCHLORIDE 10 MILLIGRAM(S): 5 TABLET ORAL at 22:46

## 2024-05-12 RX ADMIN — OXYCODONE HYDROCHLORIDE 10 MILLIGRAM(S): 5 TABLET ORAL at 14:51

## 2024-05-12 RX ADMIN — Medication 650 MILLIGRAM(S): at 17:17

## 2024-05-12 RX ADMIN — Medication 650 MILLIGRAM(S): at 18:30

## 2024-05-12 RX ADMIN — LEVETIRACETAM 500 MILLIGRAM(S): 250 TABLET, FILM COATED ORAL at 17:17

## 2024-05-12 RX ADMIN — OXYCODONE HYDROCHLORIDE 10 MILLIGRAM(S): 5 TABLET ORAL at 13:36

## 2024-05-12 RX ADMIN — Medication 5 MILLIGRAM(S): at 22:47

## 2024-05-12 RX ADMIN — Medication 1 SPRAY(S): at 17:18

## 2024-05-12 RX ADMIN — Medication 650 MILLIGRAM(S): at 05:59

## 2024-05-12 RX ADMIN — Medication 650 MILLIGRAM(S): at 13:15

## 2024-05-12 RX ADMIN — OXYCODONE HYDROCHLORIDE 10 MILLIGRAM(S): 5 TABLET ORAL at 02:34

## 2024-05-12 RX ADMIN — Medication 1 SPRAY(S): at 06:01

## 2024-05-12 RX ADMIN — Medication 25 GRAM(S): at 08:15

## 2024-05-12 RX ADMIN — MUPIROCIN 1 APPLICATION(S): 20 OINTMENT TOPICAL at 17:18

## 2024-05-12 RX ADMIN — Medication 1 APPLICATION(S): at 06:00

## 2024-05-12 RX ADMIN — Medication 1 APPLICATION(S): at 17:17

## 2024-05-12 RX ADMIN — OXYCODONE HYDROCHLORIDE 10 MILLIGRAM(S): 5 TABLET ORAL at 08:22

## 2024-05-12 RX ADMIN — OXYCODONE HYDROCHLORIDE 10 MILLIGRAM(S): 5 TABLET ORAL at 09:41

## 2024-05-12 RX ADMIN — OXYCODONE HYDROCHLORIDE 10 MILLIGRAM(S): 5 TABLET ORAL at 03:30

## 2024-05-12 RX ADMIN — OXYCODONE HYDROCHLORIDE 10 MILLIGRAM(S): 5 TABLET ORAL at 23:40

## 2024-05-12 RX ADMIN — OXYCODONE HYDROCHLORIDE 10 MILLIGRAM(S): 5 TABLET ORAL at 19:31

## 2024-05-12 RX ADMIN — Medication 650 MILLIGRAM(S): at 06:59

## 2024-05-12 RX ADMIN — SENNA PLUS 2 TABLET(S): 8.6 TABLET ORAL at 22:46

## 2024-05-12 RX ADMIN — Medication 1 DROP(S): at 17:18

## 2024-05-12 RX ADMIN — MUPIROCIN 1 APPLICATION(S): 20 OINTMENT TOPICAL at 06:00

## 2024-05-12 RX ADMIN — Medication 650 MILLIGRAM(S): at 00:36

## 2024-05-12 RX ADMIN — Medication 1 DROP(S): at 22:47

## 2024-05-12 RX ADMIN — LEVETIRACETAM 500 MILLIGRAM(S): 250 TABLET, FILM COATED ORAL at 06:00

## 2024-05-12 RX ADMIN — Medication 650 MILLIGRAM(S): at 12:05
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
## 2024-05-12 NOTE — PROGRESS NOTE ADULT - NS ATTEND AMEND GEN_ALL_CORE FT
Above assessment noted.  The patient was seen and examined by myself with the surgical PA.  The patient is with no new events or complaints overnight. The patient is for the OR with ortho hand today. Trauma stable to proceed. Will need to maintain louis, Urology follow up.

## 2024-05-13 ENCOUNTER — TRANSCRIPTION ENCOUNTER (OUTPATIENT)
Age: 29
End: 2024-05-13

## 2024-05-13 LAB
ANION GAP SERPL CALC-SCNC: 8 MMOL/L — SIGNIFICANT CHANGE UP (ref 5–17)
ANION GAP SERPL CALC-SCNC: 9 MMOL/L — SIGNIFICANT CHANGE UP (ref 5–17)
APPEARANCE UR: ABNORMAL
BACTERIA # UR AUTO: ABNORMAL /HPF
BASOPHILS # BLD AUTO: 0.02 K/UL — SIGNIFICANT CHANGE UP (ref 0–0.2)
BASOPHILS NFR BLD AUTO: 0.2 % — SIGNIFICANT CHANGE UP (ref 0–2)
BILIRUB UR-MCNC: NEGATIVE — SIGNIFICANT CHANGE UP
BUN SERPL-MCNC: 11.1 MG/DL — SIGNIFICANT CHANGE UP (ref 8–20)
BUN SERPL-MCNC: 11.8 MG/DL — SIGNIFICANT CHANGE UP (ref 8–20)
CALCIUM SERPL-MCNC: 8.6 MG/DL — SIGNIFICANT CHANGE UP (ref 8.4–10.5)
CALCIUM SERPL-MCNC: 8.6 MG/DL — SIGNIFICANT CHANGE UP (ref 8.4–10.5)
CAST: 1 /LPF — SIGNIFICANT CHANGE UP (ref 0–4)
CHLORIDE SERPL-SCNC: 93 MMOL/L — LOW (ref 96–108)
CHLORIDE SERPL-SCNC: 93 MMOL/L — LOW (ref 96–108)
CO2 SERPL-SCNC: 27 MMOL/L — SIGNIFICANT CHANGE UP (ref 22–29)
CO2 SERPL-SCNC: 27 MMOL/L — SIGNIFICANT CHANGE UP (ref 22–29)
COLOR SPEC: YELLOW — SIGNIFICANT CHANGE UP
CREAT ?TM UR-MCNC: 73 MG/DL — SIGNIFICANT CHANGE UP
CREAT ?TM UR-MCNC: 73 MG/DL — SIGNIFICANT CHANGE UP
CREAT SERPL-MCNC: 0.45 MG/DL — LOW (ref 0.5–1.3)
CREAT SERPL-MCNC: 0.54 MG/DL — SIGNIFICANT CHANGE UP (ref 0.5–1.3)
DIFF PNL FLD: NEGATIVE — SIGNIFICANT CHANGE UP
EGFR: 140 ML/MIN/1.73M2 — SIGNIFICANT CHANGE UP
EGFR: 148 ML/MIN/1.73M2 — SIGNIFICANT CHANGE UP
EOSINOPHIL # BLD AUTO: 0.13 K/UL — SIGNIFICANT CHANGE UP (ref 0–0.5)
EOSINOPHIL NFR BLD AUTO: 1.2 % — SIGNIFICANT CHANGE UP (ref 0–6)
GLUCOSE SERPL-MCNC: 109 MG/DL — HIGH (ref 70–99)
GLUCOSE SERPL-MCNC: 118 MG/DL — HIGH (ref 70–99)
GLUCOSE UR QL: NEGATIVE MG/DL — SIGNIFICANT CHANGE UP
HCT VFR BLD CALC: 25.7 % — LOW (ref 39–50)
HGB BLD-MCNC: 9.2 G/DL — LOW (ref 13–17)
IMM GRANULOCYTES NFR BLD AUTO: 1.5 % — HIGH (ref 0–0.9)
KETONES UR-MCNC: NEGATIVE MG/DL — SIGNIFICANT CHANGE UP
LEUKOCYTE ESTERASE UR-ACNC: ABNORMAL
LYMPHOCYTES # BLD AUTO: 1.89 K/UL — SIGNIFICANT CHANGE UP (ref 1–3.3)
LYMPHOCYTES # BLD AUTO: 17.1 % — SIGNIFICANT CHANGE UP (ref 13–44)
MAGNESIUM SERPL-MCNC: 1.9 MG/DL — SIGNIFICANT CHANGE UP (ref 1.6–2.6)
MCHC RBC-ENTMCNC: 31.1 PG — SIGNIFICANT CHANGE UP (ref 27–34)
MCHC RBC-ENTMCNC: 35.8 GM/DL — SIGNIFICANT CHANGE UP (ref 32–36)
MCV RBC AUTO: 86.8 FL — SIGNIFICANT CHANGE UP (ref 80–100)
MONOCYTES # BLD AUTO: 1.3 K/UL — HIGH (ref 0–0.9)
MONOCYTES NFR BLD AUTO: 11.8 % — SIGNIFICANT CHANGE UP (ref 2–14)
NEUTROPHILS # BLD AUTO: 7.53 K/UL — HIGH (ref 1.8–7.4)
NEUTROPHILS NFR BLD AUTO: 68.2 % — SIGNIFICANT CHANGE UP (ref 43–77)
NITRITE UR-MCNC: POSITIVE
OSMOLALITY SERPL: 269 MOSMOL/KG — LOW (ref 275–300)
OSMOLALITY SERPL: 275 MOSMOL/KG — SIGNIFICANT CHANGE UP (ref 275–300)
OSMOLALITY UR: 393 MOSM/KG — SIGNIFICANT CHANGE UP (ref 300–1000)
PH UR: 6.5 — SIGNIFICANT CHANGE UP (ref 5–8)
PHOSPHATE SERPL-MCNC: 3.5 MG/DL — SIGNIFICANT CHANGE UP (ref 2.4–4.7)
PLATELET # BLD AUTO: 283 K/UL — SIGNIFICANT CHANGE UP (ref 150–400)
POTASSIUM SERPL-MCNC: 4.2 MMOL/L — SIGNIFICANT CHANGE UP (ref 3.5–5.3)
POTASSIUM SERPL-MCNC: 4.4 MMOL/L — SIGNIFICANT CHANGE UP (ref 3.5–5.3)
POTASSIUM SERPL-SCNC: 4.2 MMOL/L — SIGNIFICANT CHANGE UP (ref 3.5–5.3)
POTASSIUM SERPL-SCNC: 4.4 MMOL/L — SIGNIFICANT CHANGE UP (ref 3.5–5.3)
PROT ?TM UR-MCNC: 14 MG/DL — HIGH (ref 0–12)
PROT UR-MCNC: NEGATIVE MG/DL — SIGNIFICANT CHANGE UP
PROT/CREAT UR-RTO: 0.2 RATIO — SIGNIFICANT CHANGE UP
RBC # BLD: 2.96 M/UL — LOW (ref 4.2–5.8)
RBC # FLD: 13.4 % — SIGNIFICANT CHANGE UP (ref 10.3–14.5)
RBC CASTS # UR COMP ASSIST: 0 /HPF — SIGNIFICANT CHANGE UP (ref 0–4)
SODIUM SERPL-SCNC: 128 MMOL/L — LOW (ref 135–145)
SODIUM SERPL-SCNC: 129 MMOL/L — LOW (ref 135–145)
SODIUM UR-SCNC: 43 MMOL/L — SIGNIFICANT CHANGE UP
SP GR SPEC: 1.01 — SIGNIFICANT CHANGE UP (ref 1–1.03)
SQUAMOUS # UR AUTO: 0 /HPF — SIGNIFICANT CHANGE UP (ref 0–5)
UROBILINOGEN FLD QL: 2 MG/DL (ref 0.2–1)
WBC # BLD: 11.04 K/UL — HIGH (ref 3.8–10.5)
WBC # FLD AUTO: 11.04 K/UL — HIGH (ref 3.8–10.5)
WBC UR QL: 114 /HPF — HIGH (ref 0–5)

## 2024-05-13 PROCEDURE — 99233 SBSQ HOSP IP/OBS HIGH 50: CPT | Mod: GC

## 2024-05-13 PROCEDURE — 99232 SBSQ HOSP IP/OBS MODERATE 35: CPT

## 2024-05-13 RX ORDER — SODIUM CHLORIDE 9 MG/ML
1 INJECTION INTRAMUSCULAR; INTRAVENOUS; SUBCUTANEOUS ONCE
Refills: 0 | Status: DISCONTINUED | OUTPATIENT
Start: 2024-05-13 | End: 2024-05-13

## 2024-05-13 RX ORDER — ENOXAPARIN SODIUM 100 MG/ML
40 INJECTION SUBCUTANEOUS ONCE
Refills: 0 | Status: COMPLETED | OUTPATIENT
Start: 2024-05-13 | End: 2024-05-13

## 2024-05-13 RX ORDER — SODIUM CHLORIDE 9 MG/ML
1000 INJECTION INTRAMUSCULAR; INTRAVENOUS; SUBCUTANEOUS ONCE
Refills: 0 | Status: COMPLETED | OUTPATIENT
Start: 2024-05-13 | End: 2024-05-13

## 2024-05-13 RX ORDER — SODIUM CHLORIDE 9 MG/ML
1000 INJECTION INTRAMUSCULAR; INTRAVENOUS; SUBCUTANEOUS
Refills: 0 | Status: DISCONTINUED | OUTPATIENT
Start: 2024-05-13 | End: 2024-05-14

## 2024-05-13 RX ORDER — SODIUM CHLORIDE 9 MG/ML
1 INJECTION INTRAMUSCULAR; INTRAVENOUS; SUBCUTANEOUS THREE TIMES A DAY
Refills: 0 | Status: DISCONTINUED | OUTPATIENT
Start: 2024-05-13 | End: 2024-05-14

## 2024-05-13 RX ADMIN — SODIUM CHLORIDE 120 MILLILITER(S): 9 INJECTION INTRAMUSCULAR; INTRAVENOUS; SUBCUTANEOUS at 06:39

## 2024-05-13 RX ADMIN — Medication 1 SPRAY(S): at 17:48

## 2024-05-13 RX ADMIN — Medication 1 APPLICATION(S): at 05:35

## 2024-05-13 RX ADMIN — HYDROMORPHONE HYDROCHLORIDE 0.5 MILLIGRAM(S): 2 INJECTION INTRAMUSCULAR; INTRAVENOUS; SUBCUTANEOUS at 03:17

## 2024-05-13 RX ADMIN — SODIUM CHLORIDE 120 MILLILITER(S): 9 INJECTION INTRAMUSCULAR; INTRAVENOUS; SUBCUTANEOUS at 23:26

## 2024-05-13 RX ADMIN — ENOXAPARIN SODIUM 40 MILLIGRAM(S): 100 INJECTION SUBCUTANEOUS at 15:28

## 2024-05-13 RX ADMIN — OXYCODONE HYDROCHLORIDE 10 MILLIGRAM(S): 5 TABLET ORAL at 06:35

## 2024-05-13 RX ADMIN — OXYCODONE HYDROCHLORIDE 10 MILLIGRAM(S): 5 TABLET ORAL at 21:45

## 2024-05-13 RX ADMIN — OXYCODONE HYDROCHLORIDE 10 MILLIGRAM(S): 5 TABLET ORAL at 16:57

## 2024-05-13 RX ADMIN — Medication 1 DROP(S): at 10:36

## 2024-05-13 RX ADMIN — OXYCODONE HYDROCHLORIDE 5 MILLIGRAM(S): 5 TABLET ORAL at 12:44

## 2024-05-13 RX ADMIN — Medication 650 MILLIGRAM(S): at 06:35

## 2024-05-13 RX ADMIN — SODIUM CHLORIDE 120 MILLILITER(S): 9 INJECTION INTRAMUSCULAR; INTRAVENOUS; SUBCUTANEOUS at 15:28

## 2024-05-13 RX ADMIN — Medication 650 MILLIGRAM(S): at 17:47

## 2024-05-13 RX ADMIN — Medication 1 DROP(S): at 05:35

## 2024-05-13 RX ADMIN — Medication 1 TABLET(S): at 16:57

## 2024-05-13 RX ADMIN — SENNA PLUS 2 TABLET(S): 8.6 TABLET ORAL at 21:46

## 2024-05-13 RX ADMIN — HYDROMORPHONE HYDROCHLORIDE 0.5 MILLIGRAM(S): 2 INJECTION INTRAMUSCULAR; INTRAVENOUS; SUBCUTANEOUS at 15:22

## 2024-05-13 RX ADMIN — Medication 650 MILLIGRAM(S): at 13:30

## 2024-05-13 RX ADMIN — HYDROMORPHONE HYDROCHLORIDE 0.5 MILLIGRAM(S): 2 INJECTION INTRAMUSCULAR; INTRAVENOUS; SUBCUTANEOUS at 10:30

## 2024-05-13 RX ADMIN — Medication 650 MILLIGRAM(S): at 23:24

## 2024-05-13 RX ADMIN — LEVETIRACETAM 500 MILLIGRAM(S): 250 TABLET, FILM COATED ORAL at 17:48

## 2024-05-13 RX ADMIN — Medication 650 MILLIGRAM(S): at 12:44

## 2024-05-13 RX ADMIN — LEVETIRACETAM 500 MILLIGRAM(S): 250 TABLET, FILM COATED ORAL at 05:35

## 2024-05-13 RX ADMIN — OXYCODONE HYDROCHLORIDE 10 MILLIGRAM(S): 5 TABLET ORAL at 22:45

## 2024-05-13 RX ADMIN — Medication 650 MILLIGRAM(S): at 00:08

## 2024-05-13 RX ADMIN — SODIUM CHLORIDE 1 GRAM(S): 9 INJECTION INTRAMUSCULAR; INTRAVENOUS; SUBCUTANEOUS at 23:23

## 2024-05-13 RX ADMIN — Medication 650 MILLIGRAM(S): at 18:50

## 2024-05-13 RX ADMIN — MUPIROCIN 1 APPLICATION(S): 20 OINTMENT TOPICAL at 05:35

## 2024-05-13 RX ADMIN — SODIUM CHLORIDE 1000 MILLILITER(S): 9 INJECTION INTRAMUSCULAR; INTRAVENOUS; SUBCUTANEOUS at 14:22

## 2024-05-13 RX ADMIN — Medication 650 MILLIGRAM(S): at 05:35

## 2024-05-13 RX ADMIN — Medication 1 APPLICATION(S): at 17:48

## 2024-05-13 RX ADMIN — MUPIROCIN 1 APPLICATION(S): 20 OINTMENT TOPICAL at 17:48

## 2024-05-13 RX ADMIN — OXYCODONE HYDROCHLORIDE 10 MILLIGRAM(S): 5 TABLET ORAL at 17:55

## 2024-05-13 RX ADMIN — Medication 5 MILLIGRAM(S): at 21:46

## 2024-05-13 RX ADMIN — HYDROMORPHONE HYDROCHLORIDE 0.5 MILLIGRAM(S): 2 INJECTION INTRAMUSCULAR; INTRAVENOUS; SUBCUTANEOUS at 14:22

## 2024-05-13 RX ADMIN — Medication 650 MILLIGRAM(S): at 01:32

## 2024-05-13 RX ADMIN — Medication 1 DROP(S): at 15:28

## 2024-05-13 RX ADMIN — HYDROMORPHONE HYDROCHLORIDE 0.5 MILLIGRAM(S): 2 INJECTION INTRAMUSCULAR; INTRAVENOUS; SUBCUTANEOUS at 11:30

## 2024-05-13 RX ADMIN — OXYCODONE HYDROCHLORIDE 10 MILLIGRAM(S): 5 TABLET ORAL at 05:35

## 2024-05-13 RX ADMIN — HYDROMORPHONE HYDROCHLORIDE 0.5 MILLIGRAM(S): 2 INJECTION INTRAMUSCULAR; INTRAVENOUS; SUBCUTANEOUS at 02:17

## 2024-05-13 RX ADMIN — OXYCODONE HYDROCHLORIDE 5 MILLIGRAM(S): 5 TABLET ORAL at 00:00
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
## 2024-05-13 NOTE — PROGRESS NOTE ADULT - NS ATTEND AMEND GEN_ALL_CORE FT
Pt is ready for surgery: fixation of left hand CMC joint dislocations and metacarpal fracture and trpezium fx  Risks: infection, stiff hand, nerve/vessel injury, etc...  He signed the consent.
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
## 2024-05-14 LAB
ANION GAP SERPL CALC-SCNC: 10 MMOL/L — SIGNIFICANT CHANGE UP (ref 5–17)
BASOPHILS # BLD AUTO: 0.02 K/UL — SIGNIFICANT CHANGE UP (ref 0–0.2)
BASOPHILS NFR BLD AUTO: 0.2 % — SIGNIFICANT CHANGE UP (ref 0–2)
BUN SERPL-MCNC: 8.9 MG/DL — SIGNIFICANT CHANGE UP (ref 8–20)
CALCIUM SERPL-MCNC: 8.6 MG/DL — SIGNIFICANT CHANGE UP (ref 8.4–10.5)
CHLORIDE SERPL-SCNC: 95 MMOL/L — LOW (ref 96–108)
CO2 SERPL-SCNC: 25 MMOL/L — SIGNIFICANT CHANGE UP (ref 22–29)
CREAT SERPL-MCNC: 0.49 MG/DL — LOW (ref 0.5–1.3)
EGFR: 144 ML/MIN/1.73M2 — SIGNIFICANT CHANGE UP
EOSINOPHIL # BLD AUTO: 0.15 K/UL — SIGNIFICANT CHANGE UP (ref 0–0.5)
EOSINOPHIL NFR BLD AUTO: 1.2 % — SIGNIFICANT CHANGE UP (ref 0–6)
GLUCOSE SERPL-MCNC: 106 MG/DL — HIGH (ref 70–99)
HCT VFR BLD CALC: 26.7 % — LOW (ref 39–50)
HGB BLD-MCNC: 9.3 G/DL — LOW (ref 13–17)
IMM GRANULOCYTES NFR BLD AUTO: 1.8 % — HIGH (ref 0–0.9)
LYMPHOCYTES # BLD AUTO: 1.93 K/UL — SIGNIFICANT CHANGE UP (ref 1–3.3)
LYMPHOCYTES # BLD AUTO: 15.5 % — SIGNIFICANT CHANGE UP (ref 13–44)
MAGNESIUM SERPL-MCNC: 2.1 MG/DL — SIGNIFICANT CHANGE UP (ref 1.8–2.6)
MCHC RBC-ENTMCNC: 31 PG — SIGNIFICANT CHANGE UP (ref 27–34)
MCHC RBC-ENTMCNC: 34.8 GM/DL — SIGNIFICANT CHANGE UP (ref 32–36)
MCV RBC AUTO: 89 FL — SIGNIFICANT CHANGE UP (ref 80–100)
MONOCYTES # BLD AUTO: 1.42 K/UL — HIGH (ref 0–0.9)
MONOCYTES NFR BLD AUTO: 11.4 % — SIGNIFICANT CHANGE UP (ref 2–14)
NEUTROPHILS # BLD AUTO: 8.7 K/UL — HIGH (ref 1.8–7.4)
NEUTROPHILS NFR BLD AUTO: 69.9 % — SIGNIFICANT CHANGE UP (ref 43–77)
PHOSPHATE SERPL-MCNC: 2.7 MG/DL — SIGNIFICANT CHANGE UP (ref 2.4–4.7)
PLATELET # BLD AUTO: 320 K/UL — SIGNIFICANT CHANGE UP (ref 150–400)
POTASSIUM SERPL-MCNC: 4 MMOL/L — SIGNIFICANT CHANGE UP (ref 3.5–5.3)
POTASSIUM SERPL-SCNC: 4 MMOL/L — SIGNIFICANT CHANGE UP (ref 3.5–5.3)
RBC # BLD: 3 M/UL — LOW (ref 4.2–5.8)
RBC # FLD: 14.2 % — SIGNIFICANT CHANGE UP (ref 10.3–14.5)
SODIUM SERPL-SCNC: 130 MMOL/L — LOW (ref 135–145)
SURGICAL PATHOLOGY STUDY: SIGNIFICANT CHANGE UP
WBC # BLD: 12.44 K/UL — HIGH (ref 3.8–10.5)
WBC # FLD AUTO: 12.44 K/UL — HIGH (ref 3.8–10.5)

## 2024-05-14 PROCEDURE — 99232 SBSQ HOSP IP/OBS MODERATE 35: CPT

## 2024-05-14 PROCEDURE — 99233 SBSQ HOSP IP/OBS HIGH 50: CPT

## 2024-05-14 DEVICE — K-WIRE SYNTHES TROCAR POINT 1.25MM X 150MM: Type: IMPLANTABLE DEVICE | Status: FUNCTIONAL

## 2024-05-14 DEVICE — K-WIRE MICROAIRE (SMOOTH) DOUBLE TROCAR 1.6MM X 9": Type: IMPLANTABLE DEVICE | Status: FUNCTIONAL

## 2024-05-14 DEVICE — K-WIRE SYNTHES (THREADED) TROCAR POINT 1.6MM X 150MM: Type: IMPLANTABLE DEVICE | Status: FUNCTIONAL

## 2024-05-14 DEVICE — K-WIRE MICROAIRE (SMOOTH) SINGLE TROCAR 1.6MM X 9": Type: IMPLANTABLE DEVICE | Status: FUNCTIONAL

## 2024-05-14 RX ORDER — OXYCODONE HYDROCHLORIDE 5 MG/1
5 TABLET ORAL EVERY 4 HOURS
Refills: 0 | Status: DISCONTINUED | OUTPATIENT
Start: 2024-05-14 | End: 2024-05-16

## 2024-05-14 RX ORDER — LANOLIN ALCOHOL/MO/W.PET/CERES
5 CREAM (GRAM) TOPICAL AT BEDTIME
Refills: 0 | Status: DISCONTINUED | OUTPATIENT
Start: 2024-05-14 | End: 2024-05-30

## 2024-05-14 RX ORDER — GABAPENTIN 400 MG/1
300 CAPSULE ORAL EVERY 8 HOURS
Refills: 0 | Status: DISCONTINUED | OUTPATIENT
Start: 2024-05-14 | End: 2024-05-14

## 2024-05-14 RX ORDER — CEFAZOLIN SODIUM 1 G
2000 VIAL (EA) INJECTION
Refills: 0 | Status: COMPLETED | OUTPATIENT
Start: 2024-05-14 | End: 2024-05-15

## 2024-05-14 RX ORDER — GABAPENTIN 400 MG/1
300 CAPSULE ORAL EVERY 8 HOURS
Refills: 0 | Status: DISCONTINUED | OUTPATIENT
Start: 2024-05-14 | End: 2024-05-30

## 2024-05-14 RX ORDER — HYDROMORPHONE HYDROCHLORIDE 2 MG/ML
0.5 INJECTION INTRAMUSCULAR; INTRAVENOUS; SUBCUTANEOUS ONCE
Refills: 0 | Status: DISCONTINUED | OUTPATIENT
Start: 2024-05-14 | End: 2024-05-14

## 2024-05-14 RX ORDER — HYDROMORPHONE HYDROCHLORIDE 2 MG/ML
0.5 INJECTION INTRAMUSCULAR; INTRAVENOUS; SUBCUTANEOUS EVERY 6 HOURS
Refills: 0 | Status: DISCONTINUED | OUTPATIENT
Start: 2024-05-14 | End: 2024-05-21

## 2024-05-14 RX ORDER — OXYCODONE HYDROCHLORIDE 5 MG/1
10 TABLET ORAL EVERY 4 HOURS
Refills: 0 | Status: DISCONTINUED | OUTPATIENT
Start: 2024-05-14 | End: 2024-05-16

## 2024-05-14 RX ORDER — SODIUM CHLORIDE 9 MG/ML
1000 INJECTION, SOLUTION INTRAVENOUS
Refills: 0 | Status: DISCONTINUED | OUTPATIENT
Start: 2024-05-14 | End: 2024-05-14

## 2024-05-14 RX ORDER — METHOCARBAMOL 500 MG/1
750 TABLET, FILM COATED ORAL EVERY 6 HOURS
Refills: 0 | Status: DISCONTINUED | OUTPATIENT
Start: 2024-05-14 | End: 2024-05-15

## 2024-05-14 RX ORDER — ONDANSETRON 8 MG/1
4 TABLET, FILM COATED ORAL ONCE
Refills: 0 | Status: DISCONTINUED | OUTPATIENT
Start: 2024-05-14 | End: 2024-05-14

## 2024-05-14 RX ORDER — ENOXAPARIN SODIUM 100 MG/ML
40 INJECTION SUBCUTANEOUS EVERY 12 HOURS
Refills: 0 | Status: DISCONTINUED | OUTPATIENT
Start: 2024-05-14 | End: 2024-05-30

## 2024-05-14 RX ORDER — ACETAMINOPHEN 500 MG
975 TABLET ORAL EVERY 6 HOURS
Refills: 0 | Status: DISCONTINUED | OUTPATIENT
Start: 2024-05-14 | End: 2024-05-30

## 2024-05-14 RX ORDER — HYDROMORPHONE HYDROCHLORIDE 2 MG/ML
0.5 INJECTION INTRAMUSCULAR; INTRAVENOUS; SUBCUTANEOUS
Refills: 0 | Status: DISCONTINUED | OUTPATIENT
Start: 2024-05-14 | End: 2024-05-14

## 2024-05-14 RX ADMIN — Medication 1 TABLET(S): at 18:39

## 2024-05-14 RX ADMIN — HYDROMORPHONE HYDROCHLORIDE 0.5 MILLIGRAM(S): 2 INJECTION INTRAMUSCULAR; INTRAVENOUS; SUBCUTANEOUS at 06:07

## 2024-05-14 RX ADMIN — HYDROMORPHONE HYDROCHLORIDE 0.5 MILLIGRAM(S): 2 INJECTION INTRAMUSCULAR; INTRAVENOUS; SUBCUTANEOUS at 20:08

## 2024-05-14 RX ADMIN — Medication 650 MILLIGRAM(S): at 06:37

## 2024-05-14 RX ADMIN — Medication 975 MILLIGRAM(S): at 21:07

## 2024-05-14 RX ADMIN — OXYCODONE HYDROCHLORIDE 10 MILLIGRAM(S): 5 TABLET ORAL at 03:00

## 2024-05-14 RX ADMIN — Medication 1 APPLICATION(S): at 06:06

## 2024-05-14 RX ADMIN — HYDROMORPHONE HYDROCHLORIDE 0.5 MILLIGRAM(S): 2 INJECTION INTRAMUSCULAR; INTRAVENOUS; SUBCUTANEOUS at 21:08

## 2024-05-14 RX ADMIN — LEVETIRACETAM 500 MILLIGRAM(S): 250 TABLET, FILM COATED ORAL at 06:07

## 2024-05-14 RX ADMIN — Medication 650 MILLIGRAM(S): at 00:08

## 2024-05-14 RX ADMIN — Medication 975 MILLIGRAM(S): at 20:07

## 2024-05-14 RX ADMIN — GABAPENTIN 300 MILLIGRAM(S): 400 CAPSULE ORAL at 20:07

## 2024-05-14 RX ADMIN — Medication 650 MILLIGRAM(S): at 06:07

## 2024-05-14 RX ADMIN — HYDROMORPHONE HYDROCHLORIDE 0.5 MILLIGRAM(S): 2 INJECTION INTRAMUSCULAR; INTRAVENOUS; SUBCUTANEOUS at 23:24

## 2024-05-14 RX ADMIN — Medication 1 DROP(S): at 06:07

## 2024-05-14 RX ADMIN — OXYCODONE HYDROCHLORIDE 10 MILLIGRAM(S): 5 TABLET ORAL at 10:44

## 2024-05-14 RX ADMIN — METHOCARBAMOL 750 MILLIGRAM(S): 500 TABLET, FILM COATED ORAL at 23:22

## 2024-05-14 RX ADMIN — OXYCODONE HYDROCHLORIDE 10 MILLIGRAM(S): 5 TABLET ORAL at 22:10

## 2024-05-14 RX ADMIN — HYDROMORPHONE HYDROCHLORIDE 0.5 MILLIGRAM(S): 2 INJECTION INTRAMUSCULAR; INTRAVENOUS; SUBCUTANEOUS at 01:00

## 2024-05-14 RX ADMIN — MUPIROCIN 1 APPLICATION(S): 20 OINTMENT TOPICAL at 06:06

## 2024-05-14 RX ADMIN — OXYCODONE HYDROCHLORIDE 10 MILLIGRAM(S): 5 TABLET ORAL at 23:10

## 2024-05-14 RX ADMIN — HYDROMORPHONE HYDROCHLORIDE 0.5 MILLIGRAM(S): 2 INJECTION INTRAMUSCULAR; INTRAVENOUS; SUBCUTANEOUS at 16:05

## 2024-05-14 RX ADMIN — HYDROMORPHONE HYDROCHLORIDE 0.5 MILLIGRAM(S): 2 INJECTION INTRAMUSCULAR; INTRAVENOUS; SUBCUTANEOUS at 00:39

## 2024-05-14 RX ADMIN — OXYCODONE HYDROCHLORIDE 10 MILLIGRAM(S): 5 TABLET ORAL at 02:13

## 2024-05-14 RX ADMIN — SODIUM CHLORIDE 1 GRAM(S): 9 INJECTION INTRAMUSCULAR; INTRAVENOUS; SUBCUTANEOUS at 06:07

## 2024-05-14 RX ADMIN — Medication 1 APPLICATION(S): at 12:03

## 2024-05-14 RX ADMIN — Medication 1 TABLET(S): at 06:07

## 2024-05-14 RX ADMIN — HYDROMORPHONE HYDROCHLORIDE 0.5 MILLIGRAM(S): 2 INJECTION INTRAMUSCULAR; INTRAVENOUS; SUBCUTANEOUS at 16:42

## 2024-05-14 RX ADMIN — Medication 2000 MILLIGRAM(S): at 20:07

## 2024-05-14 RX ADMIN — HYDROMORPHONE HYDROCHLORIDE 0.5 MILLIGRAM(S): 2 INJECTION INTRAMUSCULAR; INTRAVENOUS; SUBCUTANEOUS at 16:15

## 2024-05-14 RX ADMIN — HYDROMORPHONE HYDROCHLORIDE 0.5 MILLIGRAM(S): 2 INJECTION INTRAMUSCULAR; INTRAVENOUS; SUBCUTANEOUS at 06:37

## 2024-05-14 RX ADMIN — ENOXAPARIN SODIUM 40 MILLIGRAM(S): 100 INJECTION SUBCUTANEOUS at 18:39

## 2024-05-14 RX ADMIN — OXYCODONE HYDROCHLORIDE 10 MILLIGRAM(S): 5 TABLET ORAL at 09:44

## 2024-05-14 NOTE — BRIEF OPERATIVE NOTE - NSICDXBRIEFPROCEDURE_GEN_ALL_CORE_FT
PROCEDURES:  Closed reduction and internal fixation of metacarpal of left hand 14-May-2024 18:47:18  Rukhsana Mccauley  Fixation, percutaneous, dislocation, CMC joint 14-May-2024 18:48:33  Rukhsana Mccauley  
PROCEDURES:  Simple left orchiectomy 07-May-2024 17:56:22  Benton Hahn  
PROCEDURES:  Open reduction and internal fixation of symphysis pubis 07-May-2024 19:48:57  Huber Esqueda  Percutaneous pinning of sacrum 07-May-2024 19:49:12  Huber Esqueda
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
## 2024-05-14 NOTE — BRIEF OPERATIVE NOTE - NSICDXBRIEFPREOP_GEN_ALL_CORE_FT
PRE-OP DIAGNOSIS:  CMC (carpometacarpal joint) dislocation, left, initial encounter 14-May-2024 18:49:57  Rukhsana Mccauley  Closed fracture of 5th metacarpal 14-May-2024 18:50:12  Rukhsana Mccauley  Trapezium fracture 14-May-2024 18:50:25  Rukhsana Mccauley

## 2024-05-14 NOTE — BRIEF OPERATIVE NOTE - OPERATION/FINDINGS
DISLOCATION 2-5 CMC JOINT, 5TH mc FX, TRAPEZIUM FX
complete disruption of pubic symphysis and left SI joint    implants: 6 hole pubic symphysis plate, 6x3.5 screws. 145x7.3 mm fully threaded screw, 95x6.5mm screw with washer
Nonviable left testicle

## 2024-05-14 NOTE — PROGRESS NOTE ADULT - SUBJECTIVE AND OBJECTIVE BOX
ORTHO-Hand POST OP PROGRESS NOTE      [id number]    KIERSTEN LIGHT      PROCEDURE: CRPP left hand digits 2-5, CRPP left trapezium  DOS: 5/14/2024    SUBJECTIVE: 28y Patient seen and examined. Patient is POD#0, stable. Patient reports of moderate discomfort that is controlled by pain medications. Patient denies of acute sensory or motor changes.                         9.3    12.44 )-----------( 320      ( 14 May 2024 06:18 )             26.7     I&O's Detail    13 May 2024 07:01  -  14 May 2024 07:00  --------------------------------------------------------  IN:    Oral Fluid: 240 mL    sodium chloride 0.9%: 1440 mL    Sodium Chloride 0.9% Bolus: 1000 mL  Total IN: 2680 mL    OUT:    Indwelling Catheter - Urethral (mL): 4850 mL  Total OUT: 4850 mL    Total NET: -2170 mL            acetaminophen     Tablet .. 975 milliGRAM(s) Oral every 6 hours  ceFAZolin  Injectable. 2000 milliGRAM(s) IV Push <User Schedule>  enoxaparin Injectable 40 milliGRAM(s) SubCutaneous every 12 hours  gabapentin 300 milliGRAM(s) Oral every 8 hours  HYDROmorphone  Injectable 0.5 milliGRAM(s) IV Push every 6 hours PRN  melatonin 5 milliGRAM(s) Oral at bedtime PRN  methocarbamol 750 milliGRAM(s) Oral every 6 hours  oxyCODONE    IR 5 milliGRAM(s) Oral every 4 hours PRN  oxyCODONE    IR 10 milliGRAM(s) Oral every 4 hours PRN  trimethoprim  160 mG/sulfamethoxazole 800 mG 1 Tablet(s) Oral two times a day        T(C): 36.8 (05-14-24 @ 17:45), Max: 38.2 (05-14-24 @ 11:57)  HR: 98 (05-14-24 @ 17:45) (88 - 105)  BP: 124/78 (05-14-24 @ 17:45) (107/68 - 158/64)  RR: 18 (05-14-24 @ 17:45) (11 - 18)  SpO2: 98% (05-14-24 @ 17:45) (98% - 100%)  Wt(kg): --      PHYSICAL EXAM:     Constitutional: Alert, responsive, in no acute distress.     Injured Extremity: LUE exam         Dressing: LUE cleaan/dry/intact. LUE  splint in place. Noactive bleeding or discharge noted.            Sensation: normal to light touch intact to finger tips 1-5 of left hand               Motor exam:  Unable to access ROM of MCP and PIP to all digits 2/2 to CRPP, patient able to flex and extend at DIP to digits 1-5 on left                  Vascular:  + warm well perfused; capillary refill <3 seconds                                                       A/P :   Male S/P CRPP left hand digits 2-5, CRPP left trapezium  POD# 0    -  Pain control  -  DVT ppx: Lovenox   -  Weight bearing status:  NWB  LUE, Maintain splint at all times, keep clean/dry/intact  -  ABX per protocol   - Remaining care per primary team
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
## 2024-05-14 NOTE — PRE-OP CHECKLIST - BOWEL PREP
Rest, ice, elevate. Take Tylenol as directed for pain control. Return if symptoms worsen or progress in any way. n/a

## 2024-05-14 NOTE — BRIEF OPERATIVE NOTE - NSICDXBRIEFPOSTOP_GEN_ALL_CORE_FT
POST-OP DIAGNOSIS:  Testicular infarct, left 07-May-2024 17:57:47  Benton Hahn  
POST-OP DIAGNOSIS:  Trapezium fracture 14-May-2024 18:50:45  Rukhsana Mccauley  CMC (carpometacarpal joint) dislocation, left, initial encounter 14-May-2024 18:50:57  Rukhsana Mccauley  Closed fracture of 5th metacarpal 14-May-2024 18:51:07  Rukhsana Mccauley  
POST-OP DIAGNOSIS:  Multiple fractures of pelvis with disruption of pelvic ring 07-May-2024 19:49:51  Huber Esqueda
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
## 2024-05-14 NOTE — BRIEF OPERATIVE NOTE - FIRST ASSIST CATEGORY
No Resident Program within this Specialty at this Location
No Resident Program within this Specialty at this Location

## 2024-05-15 LAB
ANION GAP SERPL CALC-SCNC: 12 MMOL/L — SIGNIFICANT CHANGE UP (ref 5–17)
BASOPHILS # BLD AUTO: 0.01 K/UL — SIGNIFICANT CHANGE UP (ref 0–0.2)
BASOPHILS NFR BLD AUTO: 0.1 % — SIGNIFICANT CHANGE UP (ref 0–2)
BUN SERPL-MCNC: 8.8 MG/DL — SIGNIFICANT CHANGE UP (ref 8–20)
CALCIUM SERPL-MCNC: 8.5 MG/DL — SIGNIFICANT CHANGE UP (ref 8.4–10.5)
CHLORIDE SERPL-SCNC: 95 MMOL/L — LOW (ref 96–108)
CO2 SERPL-SCNC: 25 MMOL/L — SIGNIFICANT CHANGE UP (ref 22–29)
CREAT SERPL-MCNC: 0.43 MG/DL — LOW (ref 0.5–1.3)
EGFR: 150 ML/MIN/1.73M2 — SIGNIFICANT CHANGE UP
EOSINOPHIL # BLD AUTO: 0.03 K/UL — SIGNIFICANT CHANGE UP (ref 0–0.5)
EOSINOPHIL NFR BLD AUTO: 0.3 % — SIGNIFICANT CHANGE UP (ref 0–6)
GLUCOSE SERPL-MCNC: 113 MG/DL — HIGH (ref 70–99)
HCT VFR BLD CALC: 25.8 % — LOW (ref 39–50)
HGB BLD-MCNC: 9 G/DL — LOW (ref 13–17)
IMM GRANULOCYTES NFR BLD AUTO: 1.1 % — HIGH (ref 0–0.9)
LYMPHOCYTES # BLD AUTO: 1.6 K/UL — SIGNIFICANT CHANGE UP (ref 1–3.3)
LYMPHOCYTES # BLD AUTO: 14.7 % — SIGNIFICANT CHANGE UP (ref 13–44)
MAGNESIUM SERPL-MCNC: 2.1 MG/DL — SIGNIFICANT CHANGE UP (ref 1.6–2.6)
MCHC RBC-ENTMCNC: 31.1 PG — SIGNIFICANT CHANGE UP (ref 27–34)
MCHC RBC-ENTMCNC: 34.9 GM/DL — SIGNIFICANT CHANGE UP (ref 32–36)
MCV RBC AUTO: 89.3 FL — SIGNIFICANT CHANGE UP (ref 80–100)
MONOCYTES # BLD AUTO: 1.28 K/UL — HIGH (ref 0–0.9)
MONOCYTES NFR BLD AUTO: 11.8 % — SIGNIFICANT CHANGE UP (ref 2–14)
NEUTROPHILS # BLD AUTO: 7.82 K/UL — HIGH (ref 1.8–7.4)
NEUTROPHILS NFR BLD AUTO: 72 % — SIGNIFICANT CHANGE UP (ref 43–77)
PHOSPHATE SERPL-MCNC: 3.1 MG/DL — SIGNIFICANT CHANGE UP (ref 2.4–4.7)
PLATELET # BLD AUTO: 399 K/UL — SIGNIFICANT CHANGE UP (ref 150–400)
POTASSIUM SERPL-MCNC: 4.4 MMOL/L — SIGNIFICANT CHANGE UP (ref 3.5–5.3)
POTASSIUM SERPL-SCNC: 4.4 MMOL/L — SIGNIFICANT CHANGE UP (ref 3.5–5.3)
RBC # BLD: 2.89 M/UL — LOW (ref 4.2–5.8)
RBC # FLD: 14.2 % — SIGNIFICANT CHANGE UP (ref 10.3–14.5)
SODIUM SERPL-SCNC: 132 MMOL/L — LOW (ref 135–145)
WBC # BLD: 10.86 K/UL — HIGH (ref 3.8–10.5)
WBC # FLD AUTO: 10.86 K/UL — HIGH (ref 3.8–10.5)

## 2024-05-15 PROCEDURE — 99231 SBSQ HOSP IP/OBS SF/LOW 25: CPT

## 2024-05-15 PROCEDURE — 99233 SBSQ HOSP IP/OBS HIGH 50: CPT

## 2024-05-15 RX ORDER — LIDOCAINE 4 G/100G
2 CREAM TOPICAL DAILY
Refills: 0 | Status: DISCONTINUED | OUTPATIENT
Start: 2024-05-15 | End: 2024-05-30

## 2024-05-15 RX ORDER — METHOCARBAMOL 500 MG/1
1000 TABLET, FILM COATED ORAL EVERY 6 HOURS
Refills: 0 | Status: DISCONTINUED | OUTPATIENT
Start: 2024-05-15 | End: 2024-05-18

## 2024-05-15 RX ORDER — SENNA PLUS 8.6 MG/1
2 TABLET ORAL AT BEDTIME
Refills: 0 | Status: DISCONTINUED | OUTPATIENT
Start: 2024-05-15 | End: 2024-05-30

## 2024-05-15 RX ADMIN — HYDROMORPHONE HYDROCHLORIDE 0.5 MILLIGRAM(S): 2 INJECTION INTRAMUSCULAR; INTRAVENOUS; SUBCUTANEOUS at 19:42

## 2024-05-15 RX ADMIN — OXYCODONE HYDROCHLORIDE 10 MILLIGRAM(S): 5 TABLET ORAL at 20:35

## 2024-05-15 RX ADMIN — Medication 975 MILLIGRAM(S): at 18:00

## 2024-05-15 RX ADMIN — Medication 975 MILLIGRAM(S): at 08:01

## 2024-05-15 RX ADMIN — Medication 2000 MILLIGRAM(S): at 05:46

## 2024-05-15 RX ADMIN — LIDOCAINE 2 PATCH: 4 CREAM TOPICAL at 19:00

## 2024-05-15 RX ADMIN — LIDOCAINE 2 PATCH: 4 CREAM TOPICAL at 22:00

## 2024-05-15 RX ADMIN — HYDROMORPHONE HYDROCHLORIDE 0.5 MILLIGRAM(S): 2 INJECTION INTRAMUSCULAR; INTRAVENOUS; SUBCUTANEOUS at 12:43

## 2024-05-15 RX ADMIN — HYDROMORPHONE HYDROCHLORIDE 0.5 MILLIGRAM(S): 2 INJECTION INTRAMUSCULAR; INTRAVENOUS; SUBCUTANEOUS at 22:07

## 2024-05-15 RX ADMIN — HYDROMORPHONE HYDROCHLORIDE 0.5 MILLIGRAM(S): 2 INJECTION INTRAMUSCULAR; INTRAVENOUS; SUBCUTANEOUS at 13:46

## 2024-05-15 RX ADMIN — HYDROMORPHONE HYDROCHLORIDE 0.5 MILLIGRAM(S): 2 INJECTION INTRAMUSCULAR; INTRAVENOUS; SUBCUTANEOUS at 18:39

## 2024-05-15 RX ADMIN — GABAPENTIN 300 MILLIGRAM(S): 400 CAPSULE ORAL at 14:04

## 2024-05-15 RX ADMIN — HYDROMORPHONE HYDROCHLORIDE 0.5 MILLIGRAM(S): 2 INJECTION INTRAMUSCULAR; INTRAVENOUS; SUBCUTANEOUS at 00:24

## 2024-05-15 RX ADMIN — Medication 975 MILLIGRAM(S): at 03:42

## 2024-05-15 RX ADMIN — Medication 1 TABLET(S): at 05:46

## 2024-05-15 RX ADMIN — ENOXAPARIN SODIUM 40 MILLIGRAM(S): 100 INJECTION SUBCUTANEOUS at 05:46

## 2024-05-15 RX ADMIN — HYDROMORPHONE HYDROCHLORIDE 0.5 MILLIGRAM(S): 2 INJECTION INTRAMUSCULAR; INTRAVENOUS; SUBCUTANEOUS at 06:46

## 2024-05-15 RX ADMIN — Medication 975 MILLIGRAM(S): at 15:17

## 2024-05-15 RX ADMIN — METHOCARBAMOL 750 MILLIGRAM(S): 500 TABLET, FILM COATED ORAL at 05:46

## 2024-05-15 RX ADMIN — Medication 975 MILLIGRAM(S): at 09:51

## 2024-05-15 RX ADMIN — GABAPENTIN 300 MILLIGRAM(S): 400 CAPSULE ORAL at 22:06

## 2024-05-15 RX ADMIN — Medication 975 MILLIGRAM(S): at 02:42

## 2024-05-15 RX ADMIN — Medication 1 TABLET(S): at 15:58

## 2024-05-15 RX ADMIN — METHOCARBAMOL 1000 MILLIGRAM(S): 500 TABLET, FILM COATED ORAL at 11:33

## 2024-05-15 RX ADMIN — METHOCARBAMOL 1000 MILLIGRAM(S): 500 TABLET, FILM COATED ORAL at 15:58

## 2024-05-15 RX ADMIN — OXYCODONE HYDROCHLORIDE 10 MILLIGRAM(S): 5 TABLET ORAL at 02:42

## 2024-05-15 RX ADMIN — Medication 975 MILLIGRAM(S): at 14:04

## 2024-05-15 RX ADMIN — LIDOCAINE 2 PATCH: 4 CREAM TOPICAL at 10:33

## 2024-05-15 RX ADMIN — OXYCODONE HYDROCHLORIDE 10 MILLIGRAM(S): 5 TABLET ORAL at 03:42

## 2024-05-15 RX ADMIN — GABAPENTIN 300 MILLIGRAM(S): 400 CAPSULE ORAL at 05:46

## 2024-05-15 RX ADMIN — HYDROMORPHONE HYDROCHLORIDE 0.5 MILLIGRAM(S): 2 INJECTION INTRAMUSCULAR; INTRAVENOUS; SUBCUTANEOUS at 05:46

## 2024-05-15 RX ADMIN — ENOXAPARIN SODIUM 40 MILLIGRAM(S): 100 INJECTION SUBCUTANEOUS at 17:00

## 2024-05-15 RX ADMIN — OXYCODONE HYDROCHLORIDE 10 MILLIGRAM(S): 5 TABLET ORAL at 19:35

## 2024-05-15 RX ADMIN — SENNA PLUS 2 TABLET(S): 8.6 TABLET ORAL at 22:06
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
## 2024-05-15 NOTE — PROGRESS NOTE ADULT - SUBJECTIVE AND OBJECTIVE BOX
SUBJECTIVE / 24H EVENTS: Patient seen examined at bedside. He went to OR yesterday w/ Dr. Mccauley for closed reduction percutaneous pinning of left hand digits 2-5 & left trapezium. Pt reports pain to his L hand and his groin are what are bothering him most. Medications helping to give partial relief. Has been tolerating diet, passing gas. Patrick remains in per  recs, draining well. Pt denies fever or chills, CP or SOB.    MEDICATIONS  (STANDING):  acetaminophen     Tablet .. 975 milliGRAM(s) Oral every 6 hours  enoxaparin Injectable 40 milliGRAM(s) SubCutaneous every 12 hours  gabapentin 300 milliGRAM(s) Oral every 8 hours  lidocaine   4% Patch 2 Patch Transdermal daily  methocarbamol 1000 milliGRAM(s) Oral every 6 hours  senna 2 Tablet(s) Oral at bedtime  trimethoprim  160 mG/sulfamethoxazole 800 mG 1 Tablet(s) Oral two times a day    MEDICATIONS  (PRN):  HYDROmorphone  Injectable 0.5 milliGRAM(s) IV Push every 6 hours PRN break thru pain  melatonin 5 milliGRAM(s) Oral at bedtime PRN Sleep  oxyCODONE    IR 10 milliGRAM(s) Oral every 4 hours PRN Severe Pain (7 - 10)  oxyCODONE    IR 5 milliGRAM(s) Oral every 4 hours PRN Moderate Pain (4 - 6)      Vital Signs Last 24 Hrs  T(C): 36.7 (15 May 2024 11:00), Max: 36.9 (14 May 2024 15:45)  T(F): 98 (15 May 2024 11:00), Max: 98.5 (14 May 2024 15:45)  HR: 80 (15 May 2024 11:00) (73 - 105)  BP: 128/82 (15 May 2024 11:00) (102/66 - 158/64)  BP(mean): 92 (14 May 2024 17:00) (74 - 92)  RR: 19 (15 May 2024 11:00) (11 - 20)  SpO2: 98% (15 May 2024 11:00) (95% - 100%)    Parameters below as of 15 May 2024 11:00  Patient On (Oxygen Delivery Method): room air        Constitutional: patient appears comfortable resting in bed, in no apparent distress  Respiratory: respirations are unlabored, no accessory muscle use, no conversational dyspnea  HEENT: L forehead / scalp lac well approximated w/ sutures, no drainage or erythema  Cardiovascular: regular rate & rhythm  Gastrointestinal: abdomen soft & non-distended, mild lower abdominal tenderness to palpation near pelvic dressings, no rebound tenderness / guarding  : indwelling louis in place, urine is sommer & yellow. Groin ecchymosis, testicular edema improving, surgical incision clean & dry  Neurological: GCS15, A&O x 3  Musculoskeletal: LUE splinted, ACE is clean and dry, able to move fingers but causes pain, brisk cap refill. RUE w/ cock-up splint in place, motor / sensation intact. Lower extremity motor fxn 5/5 b/l, pelvic dressings clean and dry, 2+ DP pulses b/l, lower extremity compartments are soft b/l, sensation grossly intact  Skin: mucous membranes moist, no diaphoresis, pallor, cyanosis or jaundice      I&O's Detail    14 May 2024 07:01  -  15 May 2024 07:00  --------------------------------------------------------  IN:    Oral Fluid: 240 mL  Total IN: 240 mL    OUT:    Indwelling Catheter - Urethral (mL): 5000 mL  Total OUT: 5000 mL    Total NET: -4760 mL      15 May 2024 07:01  -  15 May 2024 12:41  --------------------------------------------------------  IN:    Oral Fluid: 400 mL  Total IN: 400 mL    OUT:  Total OUT: 0 mL    Total NET: 400 mL          LABS:                        9.0    10.86 )-----------( 399      ( 15 May 2024 05:09 )             25.8     05-15    132<L>  |  95<L>  |  8.8  ----------------------------<  113<H>  4.4   |  25.0  |  0.43<L>    Ca    8.5      15 May 2024 05:09  Phos  3.1     05-15  Mg     2.1     05-15        Urinalysis Basic - ( 15 May 2024 05:09 )    Color: x / Appearance: x / SG: x / pH: x  Gluc: 113 mg/dL / Ketone: x  / Bili: x / Urobili: x   Blood: x / Protein: x / Nitrite: x   Leuk Esterase: x / RBC: x / WBC x   Sq Epi: x / Non Sq Epi: x / Bacteria: x
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
## 2024-05-15 NOTE — PROGRESS NOTE ADULT - SUBJECTIVE AND OBJECTIVE BOX
ORTHO-POST-OP PROGRESS NOTE:      [id number]    KIERSTEN LIGHT      PROCEDURE: ORIF pubic symphysis, perc pinning of sacrum on 5/7 by Dr. Esqueda, CRPP left 2-5 fingers, trapezium by Dr. Mccauley 5/14         SUBJECTIVE: 28y Patient seen and examined. Patient reports of moderate discomfort that is controlled by pain medications. Patient denies of acute sensory or motor changes.                             9.0    10.86 )-----------( 399      ( 15 May 2024 05:09 )             25.8               I&O's Detail    14 May 2024 07:01  -  15 May 2024 07:00  --------------------------------------------------------  IN:    Oral Fluid: 240 mL  Total IN: 240 mL    OUT:    Indwelling Catheter - Urethral (mL): 5000 mL  Total OUT: 5000 mL    Total NET: -4760 mL      15 May 2024 07:01  -  15 May 2024 08:52  --------------------------------------------------------  IN:    Oral Fluid: 200 mL  Total IN: 200 mL    OUT:  Total OUT: 0 mL    Total NET: 200 mL            acetaminophen     Tablet .. 975 milliGRAM(s) Oral every 6 hours  enoxaparin Injectable 40 milliGRAM(s) SubCutaneous every 12 hours  gabapentin 300 milliGRAM(s) Oral every 8 hours  HYDROmorphone  Injectable 0.5 milliGRAM(s) IV Push every 6 hours PRN  melatonin 5 milliGRAM(s) Oral at bedtime PRN  methocarbamol 750 milliGRAM(s) Oral every 6 hours  oxyCODONE    IR 5 milliGRAM(s) Oral every 4 hours PRN  oxyCODONE    IR 10 milliGRAM(s) Oral every 4 hours PRN  senna 2 Tablet(s) Oral at bedtime  trimethoprim  160 mG/sulfamethoxazole 800 mG 1 Tablet(s) Oral two times a day        T(C): 36.7 (05-15-24 @ 07:31), Max: 38.2 (05-14-24 @ 11:57)  HR: 75 (05-15-24 @ 07:31) (73 - 105)  BP: 116/61 (05-15-24 @ 07:31) (102/66 - 158/64)  RR: 20 (05-15-24 @ 07:31) (11 - 20)  SpO2: 100% (05-15-24 @ 07:31) (95% - 100%)  Wt(kg): --      PHYSICAL EXAM:     Constitutional: Alert, responsive, in no acute distress.     Injured Extremity:          Dressing: Clean/dry/intact. No active bleeding or discharge noted to abd and Right hip, dressing changed incision healing well            Skin: Wound is clean and intact. No active discharge. No wound dehiscence.            Left arm in splint, ROM with pain, cap refill intact  right arm in cock up splint    A/P :  29 year old Male S/P ORIF pubic symphysis, perc pinning of sacrum on 5/7 by Dr. Esqueda, CRPP left 2-5 fingers, trapezium by Dr. Mccauley 5/14    -  Pain control  -  DVT ppx: [x]SCDs   [x] Pharmacolgic    -  PT and out of bed today  -  Weight bearing status: WBAT RLE, TTWB LLE, NWB LUE    ORTHO-POST-OP PROGRESS NOTE:      [id number]    KIERSTEN LIGHT      PROCEDURE: ORIF pubic symphysis, perc pinning of sacrum on 5/7 by Dr. Esqueda, CRPP left 2-5 fingers, trapezium by Dr. Mccauley 5/14         SUBJECTIVE: 28y Patient seen and examined. Patient reports of moderate discomfort that is controlled by pain medications. Patient denies of acute sensory or motor changes.                             9.0    10.86 )-----------( 399      ( 15 May 2024 05:09 )             25.8               I&O's Detail    14 May 2024 07:01  -  15 May 2024 07:00  --------------------------------------------------------  IN:    Oral Fluid: 240 mL  Total IN: 240 mL    OUT:    Indwelling Catheter - Urethral (mL): 5000 mL  Total OUT: 5000 mL    Total NET: -4760 mL      15 May 2024 07:01  -  15 May 2024 08:52  --------------------------------------------------------  IN:    Oral Fluid: 200 mL  Total IN: 200 mL    OUT:  Total OUT: 0 mL    Total NET: 200 mL            acetaminophen     Tablet .. 975 milliGRAM(s) Oral every 6 hours  enoxaparin Injectable 40 milliGRAM(s) SubCutaneous every 12 hours  gabapentin 300 milliGRAM(s) Oral every 8 hours  HYDROmorphone  Injectable 0.5 milliGRAM(s) IV Push every 6 hours PRN  melatonin 5 milliGRAM(s) Oral at bedtime PRN  methocarbamol 750 milliGRAM(s) Oral every 6 hours  oxyCODONE    IR 5 milliGRAM(s) Oral every 4 hours PRN  oxyCODONE    IR 10 milliGRAM(s) Oral every 4 hours PRN  senna 2 Tablet(s) Oral at bedtime  trimethoprim  160 mG/sulfamethoxazole 800 mG 1 Tablet(s) Oral two times a day        T(C): 36.7 (05-15-24 @ 07:31), Max: 38.2 (05-14-24 @ 11:57)  HR: 75 (05-15-24 @ 07:31) (73 - 105)  BP: 116/61 (05-15-24 @ 07:31) (102/66 - 158/64)  RR: 20 (05-15-24 @ 07:31) (11 - 20)  SpO2: 100% (05-15-24 @ 07:31) (95% - 100%)  Wt(kg): --      PHYSICAL EXAM:     Constitutional: Alert, responsive, in no acute distress.     Injured Extremity:          Dressing: Clean/dry/intact. No active bleeding or discharge noted to abd and Left hip, dressing changed incision healing well            Skin: Wound is clean and intact. No active discharge. No wound dehiscence.            Left arm in splint, ROM with pain, cap refill intact  right arm in cock up splint    A/P :  29 year old Male S/P ORIF pubic symphysis, perc pinning of sacrum on 5/7 by Dr. Esqueda, CRPP left 2-5 fingers, trapezium by Dr. Mccauley 5/14    -  Pain control  -  DVT ppx: [x]SCDs   [x] Pharmacolgic    -  PT and out of bed today  -  Weight bearing status: WBAT RLE, TTWB LLE, NWB LUE

## 2024-05-15 NOTE — PROGRESS NOTE ADULT - PROVIDER SPECIALTY LIST ADULT
Rehab Medicine Griseofulvin Counseling:  I discussed with the patient the risks of griseofulvin including but not limited to photosensitivity, cytopenia, liver damage, nausea/vomiting and severe allergy.  The patient understands that this medication is best absorbed when taken with a fatty meal (e.g., ice cream or french fries).

## 2024-05-16 LAB
ANION GAP SERPL CALC-SCNC: 12 MMOL/L — SIGNIFICANT CHANGE UP (ref 5–17)
BUN SERPL-MCNC: 11.5 MG/DL — SIGNIFICANT CHANGE UP (ref 8–20)
CALCIUM SERPL-MCNC: 9 MG/DL — SIGNIFICANT CHANGE UP (ref 8.4–10.5)
CHLORIDE SERPL-SCNC: 96 MMOL/L — SIGNIFICANT CHANGE UP (ref 96–108)
CO2 SERPL-SCNC: 24 MMOL/L — SIGNIFICANT CHANGE UP (ref 22–29)
CREAT SERPL-MCNC: 0.63 MG/DL — SIGNIFICANT CHANGE UP (ref 0.5–1.3)
EGFR: 134 ML/MIN/1.73M2 — SIGNIFICANT CHANGE UP
GLUCOSE SERPL-MCNC: 115 MG/DL — HIGH (ref 70–99)
MAGNESIUM SERPL-MCNC: 1.9 MG/DL — SIGNIFICANT CHANGE UP (ref 1.8–2.6)
PHOSPHATE SERPL-MCNC: 3.6 MG/DL — SIGNIFICANT CHANGE UP (ref 2.4–4.7)
POTASSIUM SERPL-MCNC: 4.3 MMOL/L — SIGNIFICANT CHANGE UP (ref 3.5–5.3)
POTASSIUM SERPL-SCNC: 4.3 MMOL/L — SIGNIFICANT CHANGE UP (ref 3.5–5.3)
SODIUM SERPL-SCNC: 132 MMOL/L — LOW (ref 135–145)

## 2024-05-16 PROCEDURE — 99233 SBSQ HOSP IP/OBS HIGH 50: CPT | Mod: GC

## 2024-05-16 PROCEDURE — 99232 SBSQ HOSP IP/OBS MODERATE 35: CPT | Mod: 24

## 2024-05-16 PROCEDURE — 99231 SBSQ HOSP IP/OBS SF/LOW 25: CPT

## 2024-05-16 RX ORDER — HYDROMORPHONE HYDROCHLORIDE 2 MG/ML
4 INJECTION INTRAMUSCULAR; INTRAVENOUS; SUBCUTANEOUS EVERY 4 HOURS
Refills: 0 | Status: DISCONTINUED | OUTPATIENT
Start: 2024-05-16 | End: 2024-05-23

## 2024-05-16 RX ORDER — LIDOCAINE HCL 20 MG/ML
5 VIAL (ML) INJECTION THREE TIMES A DAY
Refills: 0 | Status: DISCONTINUED | OUTPATIENT
Start: 2024-05-16 | End: 2024-05-30

## 2024-05-16 RX ORDER — OXYBUTYNIN CHLORIDE 5 MG
5 TABLET ORAL EVERY 8 HOURS
Refills: 0 | Status: DISCONTINUED | OUTPATIENT
Start: 2024-05-16 | End: 2024-05-24

## 2024-05-16 RX ORDER — HYDROMORPHONE HYDROCHLORIDE 2 MG/ML
2 INJECTION INTRAMUSCULAR; INTRAVENOUS; SUBCUTANEOUS EVERY 4 HOURS
Refills: 0 | Status: DISCONTINUED | OUTPATIENT
Start: 2024-05-16 | End: 2024-05-23

## 2024-05-16 RX ADMIN — HYDROMORPHONE HYDROCHLORIDE 2 MILLIGRAM(S): 2 INJECTION INTRAMUSCULAR; INTRAVENOUS; SUBCUTANEOUS at 13:37

## 2024-05-16 RX ADMIN — GABAPENTIN 300 MILLIGRAM(S): 400 CAPSULE ORAL at 13:26

## 2024-05-16 RX ADMIN — GABAPENTIN 300 MILLIGRAM(S): 400 CAPSULE ORAL at 21:04

## 2024-05-16 RX ADMIN — METHOCARBAMOL 1000 MILLIGRAM(S): 500 TABLET, FILM COATED ORAL at 23:57

## 2024-05-16 RX ADMIN — ENOXAPARIN SODIUM 40 MILLIGRAM(S): 100 INJECTION SUBCUTANEOUS at 06:01

## 2024-05-16 RX ADMIN — Medication 5 MILLIGRAM(S): at 17:34

## 2024-05-16 RX ADMIN — Medication 975 MILLIGRAM(S): at 06:01

## 2024-05-16 RX ADMIN — Medication 975 MILLIGRAM(S): at 23:57

## 2024-05-16 RX ADMIN — Medication 975 MILLIGRAM(S): at 12:38

## 2024-05-16 RX ADMIN — OXYCODONE HYDROCHLORIDE 10 MILLIGRAM(S): 5 TABLET ORAL at 04:43

## 2024-05-16 RX ADMIN — HYDROMORPHONE HYDROCHLORIDE 0.5 MILLIGRAM(S): 2 INJECTION INTRAMUSCULAR; INTRAVENOUS; SUBCUTANEOUS at 01:10

## 2024-05-16 RX ADMIN — METHOCARBAMOL 1000 MILLIGRAM(S): 500 TABLET, FILM COATED ORAL at 12:38

## 2024-05-16 RX ADMIN — HYDROMORPHONE HYDROCHLORIDE 0.5 MILLIGRAM(S): 2 INJECTION INTRAMUSCULAR; INTRAVENOUS; SUBCUTANEOUS at 22:00

## 2024-05-16 RX ADMIN — HYDROMORPHONE HYDROCHLORIDE 0.5 MILLIGRAM(S): 2 INJECTION INTRAMUSCULAR; INTRAVENOUS; SUBCUTANEOUS at 00:10

## 2024-05-16 RX ADMIN — METHOCARBAMOL 1000 MILLIGRAM(S): 500 TABLET, FILM COATED ORAL at 17:33

## 2024-05-16 RX ADMIN — HYDROMORPHONE HYDROCHLORIDE 0.5 MILLIGRAM(S): 2 INJECTION INTRAMUSCULAR; INTRAVENOUS; SUBCUTANEOUS at 17:35

## 2024-05-16 RX ADMIN — HYDROMORPHONE HYDROCHLORIDE 2 MILLIGRAM(S): 2 INJECTION INTRAMUSCULAR; INTRAVENOUS; SUBCUTANEOUS at 23:57

## 2024-05-16 RX ADMIN — Medication 1 TABLET(S): at 17:35

## 2024-05-16 RX ADMIN — Medication 975 MILLIGRAM(S): at 18:56

## 2024-05-16 RX ADMIN — HYDROMORPHONE HYDROCHLORIDE 0.5 MILLIGRAM(S): 2 INJECTION INTRAMUSCULAR; INTRAVENOUS; SUBCUTANEOUS at 08:53

## 2024-05-16 RX ADMIN — METHOCARBAMOL 1000 MILLIGRAM(S): 500 TABLET, FILM COATED ORAL at 06:01

## 2024-05-16 RX ADMIN — GABAPENTIN 300 MILLIGRAM(S): 400 CAPSULE ORAL at 06:00

## 2024-05-16 RX ADMIN — Medication 975 MILLIGRAM(S): at 00:10

## 2024-05-16 RX ADMIN — Medication 975 MILLIGRAM(S): at 17:33

## 2024-05-16 RX ADMIN — SENNA PLUS 2 TABLET(S): 8.6 TABLET ORAL at 21:04

## 2024-05-16 RX ADMIN — HYDROMORPHONE HYDROCHLORIDE 0.5 MILLIGRAM(S): 2 INJECTION INTRAMUSCULAR; INTRAVENOUS; SUBCUTANEOUS at 21:04

## 2024-05-16 RX ADMIN — Medication 1 TABLET(S): at 06:01

## 2024-05-16 RX ADMIN — Medication 975 MILLIGRAM(S): at 13:37

## 2024-05-16 RX ADMIN — HYDROMORPHONE HYDROCHLORIDE 0.5 MILLIGRAM(S): 2 INJECTION INTRAMUSCULAR; INTRAVENOUS; SUBCUTANEOUS at 07:53

## 2024-05-16 RX ADMIN — Medication 975 MILLIGRAM(S): at 01:10

## 2024-05-16 RX ADMIN — Medication 975 MILLIGRAM(S): at 06:29

## 2024-05-16 RX ADMIN — HYDROMORPHONE HYDROCHLORIDE 0.5 MILLIGRAM(S): 2 INJECTION INTRAMUSCULAR; INTRAVENOUS; SUBCUTANEOUS at 14:47

## 2024-05-16 RX ADMIN — METHOCARBAMOL 1000 MILLIGRAM(S): 500 TABLET, FILM COATED ORAL at 00:09

## 2024-05-16 RX ADMIN — HYDROMORPHONE HYDROCHLORIDE 2 MILLIGRAM(S): 2 INJECTION INTRAMUSCULAR; INTRAVENOUS; SUBCUTANEOUS at 12:38

## 2024-05-16 RX ADMIN — ENOXAPARIN SODIUM 40 MILLIGRAM(S): 100 INJECTION SUBCUTANEOUS at 17:34

## 2024-05-16 RX ADMIN — OXYCODONE HYDROCHLORIDE 10 MILLIGRAM(S): 5 TABLET ORAL at 03:43

## 2024-05-16 NOTE — PROGRESS NOTE ADULT - SUBJECTIVE AND OBJECTIVE BOX
INTERVAL HPI/OVERNIGHT EVENTS:     STATUS POST:      POST OPERATIVE DAY #:       MEDICATIONS  (STANDING):  acetaminophen     Tablet .. 975 milliGRAM(s) Oral every 6 hours  enoxaparin Injectable 40 milliGRAM(s) SubCutaneous every 12 hours  gabapentin 300 milliGRAM(s) Oral every 8 hours  lidocaine   4% Patch 2 Patch Transdermal daily  methocarbamol 1000 milliGRAM(s) Oral every 6 hours  senna 2 Tablet(s) Oral at bedtime  trimethoprim  160 mG/sulfamethoxazole 800 mG 1 Tablet(s) Oral two times a day    MEDICATIONS  (PRN):  HYDROmorphone  Injectable 0.5 milliGRAM(s) IV Push every 6 hours PRN break thru pain  melatonin 5 milliGRAM(s) Oral at bedtime PRN Sleep  oxyCODONE    IR 10 milliGRAM(s) Oral every 4 hours PRN Severe Pain (7 - 10)  oxyCODONE    IR 5 milliGRAM(s) Oral every 4 hours PRN Moderate Pain (4 - 6)      Vital Signs Last 24 Hrs  T(C): 36.7 (16 May 2024 05:55), Max: 36.9 (15 May 2024 19:24)  T(F): 98.1 (16 May 2024 05:55), Max: 98.5 (15 May 2024 19:24)  HR: 76 (16 May 2024 05:55) (60 - 85)  BP: 157/63 (16 May 2024 05:55) (110/64 - 157/63)  BP(mean): --  RR: 18 (16 May 2024 05:55) (18 - 19)  SpO2: 95% (16 May 2024 05:55) (95% - 100%)    Parameters below as of 16 May 2024 05:55  Patient On (Oxygen Delivery Method): room air        Physical Exam:    Neurological:  No sensory/motor deficits    HEENT: PERRLA, EOMI, no drainage or redness    Neck: No bruits; no thyromegaly or nodules,  No JVD    Back: Normal spine flexure, No CVA tenderness, No deformity or limitation of movement    Respiratory: Breath Sounds equal & clear to auscultation, no accessory muscle use    Cardiovascular: Regular rate & rhythm, normal S1, S2; no murmurs, gallops or rubs    Gastrointestinal: Soft, non-tender, normal bowel sounds    Extremities: No peripheral edema, No cyanosis, clubbing     Vascular: Equal and normal pulses: 2+ peripheral pulses throughout    Musculoskeletal: No joint pain, swelling or deformity; no limitation of movement    Skin: No rashes      I&O's Detail    15 May 2024 07:01  -  16 May 2024 07:00  --------------------------------------------------------  IN:    Oral Fluid: 820 mL  Total IN: 820 mL    OUT:    Indwelling Catheter - Urethral (mL): 3050 mL  Total OUT: 3050 mL    Total NET: -2230 mL          LABS:                        9.0    10.86 )-----------( 399      ( 15 May 2024 05:09 )             25.8     05-16    132<L>  |  96  |  11.5  ----------------------------<  115<H>  4.3   |  24.0  |  0.63    Ca    9.0      16 May 2024 05:06  Phos  3.6     05-16  Mg     1.9     05-16        Urinalysis Basic - ( 16 May 2024 05:06 )    Color: x / Appearance: x / SG: x / pH: x  Gluc: 115 mg/dL / Ketone: x  / Bili: x / Urobili: x   Blood: x / Protein: x / Nitrite: x   Leuk Esterase: x / RBC: x / WBC x   Sq Epi: x / Non Sq Epi: x / Bacteria: x        RADIOLOGY & ADDITIONAL STUDIES: INTERVAL HPI/OVERNIGHT EVENTS: No events overnight. Patients has no complaints this morning.      MEDICATIONS  (STANDING):  acetaminophen     Tablet .. 975 milliGRAM(s) Oral every 6 hours  enoxaparin Injectable 40 milliGRAM(s) SubCutaneous every 12 hours  gabapentin 300 milliGRAM(s) Oral every 8 hours  lidocaine   4% Patch 2 Patch Transdermal daily  methocarbamol 1000 milliGRAM(s) Oral every 6 hours  senna 2 Tablet(s) Oral at bedtime  trimethoprim  160 mG/sulfamethoxazole 800 mG 1 Tablet(s) Oral two times a day    MEDICATIONS  (PRN):  HYDROmorphone  Injectable 0.5 milliGRAM(s) IV Push every 6 hours PRN break thru pain  melatonin 5 milliGRAM(s) Oral at bedtime PRN Sleep  oxyCODONE    IR 10 milliGRAM(s) Oral every 4 hours PRN Severe Pain (7 - 10)  oxyCODONE    IR 5 milliGRAM(s) Oral every 4 hours PRN Moderate Pain (4 - 6)      Vital Signs Last 24 Hrs  T(C): 36.7 (16 May 2024 05:55), Max: 36.9 (15 May 2024 19:24)  T(F): 98.1 (16 May 2024 05:55), Max: 98.5 (15 May 2024 19:24)  HR: 76 (16 May 2024 05:55) (60 - 85)  BP: 157/63 (16 May 2024 05:55) (110/64 - 157/63)  BP(mean): --  RR: 18 (16 May 2024 05:55) (18 - 19)  SpO2: 95% (16 May 2024 05:55) (95% - 100%)    Parameters below as of 16 May 2024 05:55  Patient On (Oxygen Delivery Method): room air        Physical Exam:    Respiratory:  no accessory muscle use    Cardiovascular: Regular rate     Gastrointestinal: Soft, non-tender, no rebound tenderness or guarding    : Nguyen in place with blood at the meatus    I&O's Detail    15 May 2024 07:01  -  16 May 2024 07:00  --------------------------------------------------------  IN:    Oral Fluid: 820 mL  Total IN: 820 mL    OUT:    Indwelling Catheter - Urethral (mL): 3050 mL  Total OUT: 3050 mL    Total NET: -2230 mL          LABS:                        9.0    10.86 )-----------( 399      ( 15 May 2024 05:09 )             25.8     05-16    132<L>  |  96  |  11.5  ----------------------------<  115<H>  4.3   |  24.0  |  0.63    Ca    9.0      16 May 2024 05:06  Phos  3.6     05-16  Mg     1.9     05-16        Urinalysis Basic - ( 16 May 2024 05:06 )    Color: x / Appearance: x / SG: x / pH: x  Gluc: 115 mg/dL / Ketone: x  / Bili: x / Urobili: x   Blood: x / Protein: x / Nitrite: x   Leuk Esterase: x / RBC: x / WBC x   Sq Epi: x / Non Sq Epi: x / Bacteria: x        RADIOLOGY & ADDITIONAL STUDIES: INTERVAL HPI/OVERNIGHT EVENTS: No events overnight. Patient still complaining of pain this morning.      MEDICATIONS  (STANDING):  acetaminophen     Tablet .. 975 milliGRAM(s) Oral every 6 hours  enoxaparin Injectable 40 milliGRAM(s) SubCutaneous every 12 hours  gabapentin 300 milliGRAM(s) Oral every 8 hours  lidocaine   4% Patch 2 Patch Transdermal daily  methocarbamol 1000 milliGRAM(s) Oral every 6 hours  senna 2 Tablet(s) Oral at bedtime  trimethoprim  160 mG/sulfamethoxazole 800 mG 1 Tablet(s) Oral two times a day    MEDICATIONS  (PRN):  HYDROmorphone  Injectable 0.5 milliGRAM(s) IV Push every 6 hours PRN break thru pain  melatonin 5 milliGRAM(s) Oral at bedtime PRN Sleep  oxyCODONE    IR 10 milliGRAM(s) Oral every 4 hours PRN Severe Pain (7 - 10)  oxyCODONE    IR 5 milliGRAM(s) Oral every 4 hours PRN Moderate Pain (4 - 6)      Vital Signs Last 24 Hrs  T(C): 36.7 (16 May 2024 05:55), Max: 36.9 (15 May 2024 19:24)  T(F): 98.1 (16 May 2024 05:55), Max: 98.5 (15 May 2024 19:24)  HR: 76 (16 May 2024 05:55) (60 - 85)  BP: 157/63 (16 May 2024 05:55) (110/64 - 157/63)  BP(mean): --  RR: 18 (16 May 2024 05:55) (18 - 19)  SpO2: 95% (16 May 2024 05:55) (95% - 100%)    Parameters below as of 16 May 2024 05:55  Patient On (Oxygen Delivery Method): room air        Physical Exam:    Respiratory:  no accessory muscle use    Cardiovascular: Regular rate     Gastrointestinal: Soft, non-tender, no rebound tenderness or guarding    : Nguyen in place with blood at the meatus    I&O's Detail    15 May 2024 07:01  -  16 May 2024 07:00  --------------------------------------------------------  IN:    Oral Fluid: 820 mL  Total IN: 820 mL    OUT:    Indwelling Catheter - Urethral (mL): 3050 mL  Total OUT: 3050 mL    Total NET: -2230 mL          LABS:                        9.0    10.86 )-----------( 399      ( 15 May 2024 05:09 )             25.8     05-16    132<L>  |  96  |  11.5  ----------------------------<  115<H>  4.3   |  24.0  |  0.63    Ca    9.0      16 May 2024 05:06  Phos  3.6     05-16  Mg     1.9     05-16        Urinalysis Basic - ( 16 May 2024 05:06 )    Color: x / Appearance: x / SG: x / pH: x  Gluc: 115 mg/dL / Ketone: x  / Bili: x / Urobili: x   Blood: x / Protein: x / Nitrite: x   Leuk Esterase: x / RBC: x / WBC x   Sq Epi: x / Non Sq Epi: x / Bacteria: x        RADIOLOGY & ADDITIONAL STUDIES:
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
## 2024-05-16 NOTE — PHYSICAL THERAPY INITIAL EVALUATION ADULT - IMPAIRMENTS FOUND, PT EVAL
gait, locomotion, and balance/muscle strength
aerobic capacity/endurance/gait, locomotion, and balance/gross motor/muscle strength/poor safety awareness/ROM

## 2024-05-16 NOTE — PHYSICAL THERAPY INITIAL EVALUATION ADULT - ACTIVE RANGE OF MOTION EXAMINATION, REHAB EVAL
LUE unable, R elbow flex ~ 30deg flex with pain limiting/bilateral  lower extremity Active ROM was WFL (within functional limits)

## 2024-05-16 NOTE — PHYSICAL THERAPY INITIAL EVALUATION ADULT - PLANNED THERAPY INTERVENTIONS, PT EVAL
H&P Update:  Nara Ficth was seen and examined. History and physical has been reviewed. Significant clinical changes have occurred as noted:  D/W neurology Dr Cristiane Guillen regarding the vasogenic edema on CT head   No need for Decadron as vasogenic edema is a normal response to acute stroke.   Will d/c decadron     Signed By: Yvette Parkinson MD     August 25, 2018 6:15 AM
balance training/bed mobility training/transfer training
balance training/bed mobility training/gait training/ROM/strengthening/transfer training

## 2024-05-16 NOTE — PHYSICAL THERAPY INITIAL EVALUATION ADULT - PASSIVE RANGE OF MOTION EXAMINATION, REHAB EVAL
RUE passive shoulder flex/ elbow flex WFL, L shoulder flex WFL/bilateral lower extremity Passive ROM was WFL (within functional limits)

## 2024-05-16 NOTE — PHYSICAL THERAPY INITIAL EVALUATION ADULT - DID THE PATIENT HAVE SURGERY?
yes
Pubic symphysis open reduction internal fixation, sacral Closed Reduction Percutaneous Pinning (5/7/24), L 2-5 digits and trapezium Closed Reduction Percutaneous Pinning (5/14/24)./yes

## 2024-05-16 NOTE — PHYSICAL THERAPY INITIAL EVALUATION ADULT - GENERAL OBSERVATIONS, REHAB EVAL
Pt received in semifowler position with LUE bandage, RUE brace, louis, monitor, VCB's pleasant and agreeable to PT
Pt received supine in bed + telemetry//BP + IV + louis + hemovac + right hand splint + LUE soft cast. Pt c/o 9/10 right hand pain. Pain meds requested after mobility. Pt agreeable to PT

## 2024-05-16 NOTE — PHYSICAL THERAPY INITIAL EVALUATION ADULT - CRITERIA FOR SKILLED THERAPEUTIC INTERVENTIONS
impairments found/functional limitations in following categories/risk reduction/prevention/rehab potential/therapy frequency/predicted duration of therapy intervention/anticipated equipment needs at discharge/anticipated discharge recommendation
Pending progress/ OR/impairments found

## 2024-05-16 NOTE — PROGRESS NOTE ADULT - SUBJECTIVE AND OBJECTIVE BOX
ORTHOPEDIC PROGRESS NOTE:    Name: KIERSTEN LIGHT    MR #: [id number]    Procedure: Pubic symphysis open reduction internal fixation, sacral Closed Reduction Percutaneous Pinning (5/7/24), L 2-5 digits and trapezium Closed Reduction Percutaneous Pinning (5/14/24).      DOS: POD #2.     Patient was seen and evaluated at bedside. No acute events reported overnight. Pain is fairly well controlled with medications. No acute orthopedic complaints are expressed at this time. Patient is participating in PT. Patient denies fever, chills, N/T/W.               Vital Signs Last 24 Hrs  T(C): 36.7 (05-16-24 @ 08:00), Max: 36.7 (05-16-24 @ 05:55)  T(F): 98 (05-16-24 @ 08:00), Max: 98.1 (05-16-24 @ 05:55)  HR: 89 (05-16-24 @ 08:00) (76 - 89)  BP: 127/86 (05-16-24 @ 08:00) (127/86 - 157/63)  BP(mean): --  RR: 18 (05-16-24 @ 08:00) (18 - 18)  SpO2: 100% (05-16-24 @ 08:00) (95% - 100%)      Physical Exam:    General: Pt Alert and oriented, NAD.    Pelvic Dressings C/D/I. No erythema. +Nguyen.     Splint in place to LUE, wrist brace to RUE.     Appropriate TTP.     Pulses: 2+ dorsalis pedis pulse. Cap refill < 2 sec.    Calves supple.     Sensation: Grossly intact to light touch without deficit.    Motor: +TA/GSC/EHL/FHL bilaterally.         A/P: 28M POD #2 s/p Pubic symphysis open reduction internal fixation, sacral Closed Reduction Percutaneous Pinning (5/7/24), L 2-5 digits and trapezium Closed Reduction Percutaneous Pinning (5/14/24).    - Pain Control PRN.   - DVT per primary team.   - PT/OT/OOB.  - Incentive spirometry.   - Weight bearing status: NWB LUE, TTWB LLE, WBAT RLE.   - Maintain splint and elevate LUE.

## 2024-05-16 NOTE — PHYSICAL THERAPY INITIAL EVALUATION ADULT - ADDITIONAL COMMENTS
Pt lives in a private home with his 2 brothers and cousins. 1 step to enter without handrails, 20 steps inside with handrails however, pt's brother reports Pt can stay on first floor if needed. Pt was independent PTA without an assist device. Pt owns no DME.
Pt lives in a private home with his 2 brothers and cousins. 1 step to enter without handrails, 20 steps inside with handrails however, pt's brother reports Pt can stay on first floor if needed. Pt was independent PTA without an assist device. Pt owns no DME.

## 2024-05-17 LAB
ANION GAP SERPL CALC-SCNC: 15 MMOL/L — SIGNIFICANT CHANGE UP (ref 5–17)
BUN SERPL-MCNC: 13 MG/DL — SIGNIFICANT CHANGE UP (ref 8–20)
CALCIUM SERPL-MCNC: 9.4 MG/DL — SIGNIFICANT CHANGE UP (ref 8.4–10.5)
CHLORIDE SERPL-SCNC: 94 MMOL/L — LOW (ref 96–108)
CO2 SERPL-SCNC: 24 MMOL/L — SIGNIFICANT CHANGE UP (ref 22–29)
CREAT SERPL-MCNC: 0.59 MG/DL — SIGNIFICANT CHANGE UP (ref 0.5–1.3)
EGFR: 136 ML/MIN/1.73M2 — SIGNIFICANT CHANGE UP
GLUCOSE SERPL-MCNC: 126 MG/DL — HIGH (ref 70–99)
MAGNESIUM SERPL-MCNC: 2 MG/DL — SIGNIFICANT CHANGE UP (ref 1.6–2.6)
PHOSPHATE SERPL-MCNC: 3.7 MG/DL — SIGNIFICANT CHANGE UP (ref 2.4–4.7)
POTASSIUM SERPL-MCNC: 4.4 MMOL/L — SIGNIFICANT CHANGE UP (ref 3.5–5.3)
POTASSIUM SERPL-SCNC: 4.4 MMOL/L — SIGNIFICANT CHANGE UP (ref 3.5–5.3)
SODIUM SERPL-SCNC: 133 MMOL/L — LOW (ref 135–145)

## 2024-05-17 PROCEDURE — 99233 SBSQ HOSP IP/OBS HIGH 50: CPT | Mod: GC

## 2024-05-17 PROCEDURE — 99231 SBSQ HOSP IP/OBS SF/LOW 25: CPT

## 2024-05-17 RX ORDER — POLYETHYLENE GLYCOL 3350 17 G/17G
17 POWDER, FOR SOLUTION ORAL DAILY
Refills: 0 | Status: DISCONTINUED | OUTPATIENT
Start: 2024-05-17 | End: 2024-05-30

## 2024-05-17 RX ORDER — LACTULOSE 10 G/15ML
20 SOLUTION ORAL ONCE
Refills: 0 | Status: COMPLETED | OUTPATIENT
Start: 2024-05-17 | End: 2024-05-21

## 2024-05-17 RX ORDER — SODIUM CHLORIDE 9 MG/ML
500 INJECTION INTRAMUSCULAR; INTRAVENOUS; SUBCUTANEOUS ONCE
Refills: 0 | Status: COMPLETED | OUTPATIENT
Start: 2024-05-17 | End: 2024-05-17

## 2024-05-17 RX ADMIN — Medication 975 MILLIGRAM(S): at 14:28

## 2024-05-17 RX ADMIN — Medication 975 MILLIGRAM(S): at 05:49

## 2024-05-17 RX ADMIN — METHOCARBAMOL 1000 MILLIGRAM(S): 500 TABLET, FILM COATED ORAL at 05:49

## 2024-05-17 RX ADMIN — HYDROMORPHONE HYDROCHLORIDE 4 MILLIGRAM(S): 2 INJECTION INTRAMUSCULAR; INTRAVENOUS; SUBCUTANEOUS at 09:59

## 2024-05-17 RX ADMIN — HYDROMORPHONE HYDROCHLORIDE 0.5 MILLIGRAM(S): 2 INJECTION INTRAMUSCULAR; INTRAVENOUS; SUBCUTANEOUS at 20:12

## 2024-05-17 RX ADMIN — METHOCARBAMOL 1000 MILLIGRAM(S): 500 TABLET, FILM COATED ORAL at 14:29

## 2024-05-17 RX ADMIN — HYDROMORPHONE HYDROCHLORIDE 0.5 MILLIGRAM(S): 2 INJECTION INTRAMUSCULAR; INTRAVENOUS; SUBCUTANEOUS at 12:22

## 2024-05-17 RX ADMIN — SENNA PLUS 2 TABLET(S): 8.6 TABLET ORAL at 21:17

## 2024-05-17 RX ADMIN — METHOCARBAMOL 1000 MILLIGRAM(S): 500 TABLET, FILM COATED ORAL at 17:54

## 2024-05-17 RX ADMIN — ENOXAPARIN SODIUM 40 MILLIGRAM(S): 100 INJECTION SUBCUTANEOUS at 17:54

## 2024-05-17 RX ADMIN — HYDROMORPHONE HYDROCHLORIDE 0.5 MILLIGRAM(S): 2 INJECTION INTRAMUSCULAR; INTRAVENOUS; SUBCUTANEOUS at 04:15

## 2024-05-17 RX ADMIN — HYDROMORPHONE HYDROCHLORIDE 4 MILLIGRAM(S): 2 INJECTION INTRAMUSCULAR; INTRAVENOUS; SUBCUTANEOUS at 10:40

## 2024-05-17 RX ADMIN — SODIUM CHLORIDE 500 MILLILITER(S): 9 INJECTION INTRAMUSCULAR; INTRAVENOUS; SUBCUTANEOUS at 06:57

## 2024-05-17 RX ADMIN — Medication 1 TABLET(S): at 17:54

## 2024-05-17 RX ADMIN — Medication 975 MILLIGRAM(S): at 06:22

## 2024-05-17 RX ADMIN — GABAPENTIN 300 MILLIGRAM(S): 400 CAPSULE ORAL at 21:17

## 2024-05-17 RX ADMIN — ENOXAPARIN SODIUM 40 MILLIGRAM(S): 100 INJECTION SUBCUTANEOUS at 05:49

## 2024-05-17 RX ADMIN — Medication 975 MILLIGRAM(S): at 17:54

## 2024-05-17 RX ADMIN — HYDROMORPHONE HYDROCHLORIDE 2 MILLIGRAM(S): 2 INJECTION INTRAMUSCULAR; INTRAVENOUS; SUBCUTANEOUS at 00:20

## 2024-05-17 RX ADMIN — HYDROMORPHONE HYDROCHLORIDE 0.5 MILLIGRAM(S): 2 INJECTION INTRAMUSCULAR; INTRAVENOUS; SUBCUTANEOUS at 03:41

## 2024-05-17 RX ADMIN — Medication 1 TABLET(S): at 05:49

## 2024-05-17 RX ADMIN — Medication 975 MILLIGRAM(S): at 00:20

## 2024-05-17 RX ADMIN — HYDROMORPHONE HYDROCHLORIDE 0.5 MILLIGRAM(S): 2 INJECTION INTRAMUSCULAR; INTRAVENOUS; SUBCUTANEOUS at 11:52

## 2024-05-17 RX ADMIN — GABAPENTIN 300 MILLIGRAM(S): 400 CAPSULE ORAL at 14:28

## 2024-05-17 RX ADMIN — HYDROMORPHONE HYDROCHLORIDE 0.5 MILLIGRAM(S): 2 INJECTION INTRAMUSCULAR; INTRAVENOUS; SUBCUTANEOUS at 21:00

## 2024-05-17 RX ADMIN — GABAPENTIN 300 MILLIGRAM(S): 400 CAPSULE ORAL at 05:49

## 2024-05-17 NOTE — PROGRESS NOTE ADULT - SUBJECTIVE AND OBJECTIVE BOX
INTERVAL HPI/OVERNIGHT EVENTS: No events overnight      MEDICATIONS  (STANDING):  acetaminophen     Tablet .. 975 milliGRAM(s) Oral every 6 hours  enoxaparin Injectable 40 milliGRAM(s) SubCutaneous every 12 hours  gabapentin 300 milliGRAM(s) Oral every 8 hours  lidocaine   4% Patch 2 Patch Transdermal daily  methocarbamol 1000 milliGRAM(s) Oral every 6 hours  senna 2 Tablet(s) Oral at bedtime  trimethoprim  160 mG/sulfamethoxazole 800 mG 1 Tablet(s) Oral two times a day    MEDICATIONS  (PRN):  HYDROmorphone   Tablet 2 milliGRAM(s) Oral every 4 hours PRN Moderate Pain (4 - 6)  HYDROmorphone   Tablet 4 milliGRAM(s) Oral every 4 hours PRN Severe Pain (7 - 10)  HYDROmorphone  Injectable 0.5 milliGRAM(s) IV Push every 6 hours PRN break thru pain  lidocaine 2% Jelly 5 milliLiter(s) IntraUrethral three times a day PRN discomfort at meatus from catheter  melatonin 5 milliGRAM(s) Oral at bedtime PRN Sleep  oxybutynin 5 milliGRAM(s) Oral every 8 hours PRN Bladder spasms      Vital Signs Last 24 Hrs  T(C): 36.7 (17 May 2024 04:53), Max: 36.8 (16 May 2024 15:48)  T(F): 98 (17 May 2024 04:53), Max: 98.3 (16 May 2024 15:48)  HR: 79 (17 May 2024 04:53) (79 - 93)  BP: 117/66 (17 May 2024 04:53) (117/66 - 132/69)  BP(mean): 90 (16 May 2024 20:10) (90 - 90)  RR: 18 (17 May 2024 04:53) (16 - 18)  SpO2: 99% (17 May 2024 04:53) (96% - 100%)    Parameters below as of 17 May 2024 04:53  Patient On (Oxygen Delivery Method): room air        Physical Exam:    Respiratory:  no accessory muscle use    Cardiovascular: Regular rate     Gastrointestinal: Soft, non-tender, no rebound tenderness or guarding    : Nguyen in place with blood at the meatus      I&O's Detail    16 May 2024 07:01  -  17 May 2024 07:00  --------------------------------------------------------  IN:    Oral Fluid: 680 mL  Total IN: 680 mL    OUT:    Indwelling Catheter - Urethral (mL): 1000 mL    Voided (mL): 1900 mL  Total OUT: 2900 mL    Total NET: -2220 mL          LABS:    05-17    133<L>  |  94<L>  |  13.0  ----------------------------<  126<H>  4.4   |  24.0  |  0.59    Ca    9.4      17 May 2024 05:06  Phos  3.7     05-17  Mg     2.0     05-17        Urinalysis Basic - ( 17 May 2024 05:06 )    Color: x / Appearance: x / SG: x / pH: x  Gluc: 126 mg/dL / Ketone: x  / Bili: x / Urobili: x   Blood: x / Protein: x / Nitrite: x   Leuk Esterase: x / RBC: x / WBC x   Sq Epi: x / Non Sq Epi: x / Bacteria: x
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
## 2024-05-17 NOTE — OCCUPATIONAL THERAPY INITIAL EVALUATION ADULT - SHORT TERM MEMORY, REHAB EVAL
Onset:  15 min ago  Location/Description:  R calf and foot and swelling 3:5, and little in L foot 1:5  ( if 1: no swelling - 5: severe). No pain, redness, or hot spots.  Precipitating Factors:  above  Pain Scale (1-10), 10 highest:  \"little tightness\"  Associated Symptoms:  above  LMP: Patient's last menstrual period was 2016.  Gestational Age:  36w2d  OB History:   Obstetric History       T0      TAB0   SAB1   E0   M0   L0            Group B Strep (pos or neg):  Not tested  Recent Care:  none    Reason for Disposition  • [1] Thigh, calf, or ankle swelling AND [2] bilateral AND [3] 1 side is more swollen    Protocols used: PREGNANCY - LEG SWELLING AND EDEMA-A-AH  next appt siddharth Lee on call  Orders:  Patient to be see in clinic this AM (can eval and determine DVT if needed at that time), call when open  Orders relayed to patient, who verbalized understanding   intact

## 2024-05-17 NOTE — PROGRESS NOTE ADULT - SUBJECTIVE AND OBJECTIVE BOX
Subjective:     Pt seen and examined at bedside during rounds with the team.  Pt was having bladder pain yesterday and a nurse stated his louis had been "leaking" for a couple days. Pt started on oxybutynin yesterday, and stated this morning it had "really been helping" him. Pt has no new urological complaints at this time. Louis examined and showed no signs of leaking this morning. Pt denies CP, SOB, N/V.     Louis: 1,000ml     Vital Signs Last 24 Hrs  T(C): 36.9 (17 May 2024 07:48), Max: 36.9 (17 May 2024 07:48)  T(F): 98.5 (17 May 2024 07:48), Max: 98.5 (17 May 2024 07:48)  HR: 85 (17 May 2024 07:48) (79 - 93)  BP: 118/79 (17 May 2024 07:48) (117/66 - 132/69)  BP(mean): 90 (16 May 2024 20:10) (90 - 90)  RR: 18 (17 May 2024 07:48) (16 - 18)  SpO2: 98% (17 May 2024 07:48) (96% - 99%)    Parameters below as of 17 May 2024 07:48  Patient On (Oxygen Delivery Method): room air      I&O's Detail    16 May 2024 07:01  -  17 May 2024 07:00  --------------------------------------------------------  IN:    Oral Fluid: 680 mL  Total IN: 680 mL    OUT:    Indwelling Catheter - Urethral (mL): 1000 mL    Voided (mL): 1900 mL  Total OUT: 2900 mL    Total NET: -2220 mL      Labs:    05-17    133<L>  |  94<L>  |  13.0  ----------------------------<  126<H>  4.4   |  24.0  |  0.59    Ca    9.4      17 May 2024 05:06  Phos  3.7     05-17  Mg     2.0     05-17      Physical Exam  Constitutional: patient appears comfortable resting in bed, in no apparent distress  Respiratory: respirations are unlabored, no accessory muscle use, no conversational dyspnea  Cardiovascular: regular rate & rhythm  : louis in place with yellow clear urine collecting in bag at bedside, scrotal incision site healing well without signs of infection or edema    Musculoskeletal: Splint in place to LUE, wrist brace to RUE   Skin: mucous membranes moist, no diaphoresis, pallor, cyanosis or jaundice
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
## 2024-05-17 NOTE — OCCUPATIONAL THERAPY INITIAL EVALUATION ADULT - RANGE OF MOTION EXAMINATION, UPPER EXTREMITY
Pt with b/l shoulder shrug intact, minimal shoulder flexion , LUE not assessed elbow/wrist/grasp 2* wrapping/injury able to minimally move digits. RUE elbow grossly WFL, wrist not assessed 2*injury/brace, pt able to move all digits

## 2024-05-17 NOTE — OCCUPATIONAL THERAPY INITIAL EVALUATION ADULT - GENERAL OBSERVATIONS, REHAB EVAL
Pt received supine in bed, NAD, +LUE splint/ace wrap, +RUE wrist splint, +Nguyen, +IV lock, on RA A&Ox4 brother bedside. Pt c/o 4/10 pre/post pain. Pt agreeable to OT evaluation

## 2024-05-17 NOTE — OCCUPATIONAL THERAPY INITIAL EVALUATION ADULT - ADDITIONAL COMMENTS
Pt lives in a private home with his 2 brothers and cousins. 1 step to enter without handrails, 20 steps inside with handrails however, pt's brother reports Pt can stay on first floor if needed. Pt has a shower with doors and no grab bars. Pt was independent PTA without an assist device. Pt owns no DME. Pt is right handed and drives. Pt family can assist however not 24/7, all work.

## 2024-05-17 NOTE — OCCUPATIONAL THERAPY INITIAL EVALUATION ADULT - IMPAIRED TRANSFERS: BED/CHAIR, REHAB EVAL
impaired balance/cognition/impaired coordination/decreased flexibility/impaired motor control/pain/impaired postural control/decreased ROM/decreased strength
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
## 2024-05-18 PROCEDURE — 99231 SBSQ HOSP IP/OBS SF/LOW 25: CPT

## 2024-05-18 RX ADMIN — METHOCARBAMOL 1000 MILLIGRAM(S): 500 TABLET, FILM COATED ORAL at 00:41

## 2024-05-18 RX ADMIN — HYDROMORPHONE HYDROCHLORIDE 0.5 MILLIGRAM(S): 2 INJECTION INTRAMUSCULAR; INTRAVENOUS; SUBCUTANEOUS at 16:07

## 2024-05-18 RX ADMIN — ENOXAPARIN SODIUM 40 MILLIGRAM(S): 100 INJECTION SUBCUTANEOUS at 18:34

## 2024-05-18 RX ADMIN — Medication 975 MILLIGRAM(S): at 05:18

## 2024-05-18 RX ADMIN — ENOXAPARIN SODIUM 40 MILLIGRAM(S): 100 INJECTION SUBCUTANEOUS at 05:18

## 2024-05-18 RX ADMIN — Medication 975 MILLIGRAM(S): at 02:00

## 2024-05-18 RX ADMIN — SENNA PLUS 2 TABLET(S): 8.6 TABLET ORAL at 21:34

## 2024-05-18 RX ADMIN — HYDROMORPHONE HYDROCHLORIDE 4 MILLIGRAM(S): 2 INJECTION INTRAMUSCULAR; INTRAVENOUS; SUBCUTANEOUS at 15:24

## 2024-05-18 RX ADMIN — Medication 975 MILLIGRAM(S): at 00:41

## 2024-05-18 RX ADMIN — HYDROMORPHONE HYDROCHLORIDE 4 MILLIGRAM(S): 2 INJECTION INTRAMUSCULAR; INTRAVENOUS; SUBCUTANEOUS at 14:54

## 2024-05-18 RX ADMIN — Medication 975 MILLIGRAM(S): at 18:34

## 2024-05-18 RX ADMIN — HYDROMORPHONE HYDROCHLORIDE 4 MILLIGRAM(S): 2 INJECTION INTRAMUSCULAR; INTRAVENOUS; SUBCUTANEOUS at 22:52

## 2024-05-18 RX ADMIN — GABAPENTIN 300 MILLIGRAM(S): 400 CAPSULE ORAL at 21:34

## 2024-05-18 RX ADMIN — Medication 975 MILLIGRAM(S): at 13:21

## 2024-05-18 RX ADMIN — HYDROMORPHONE HYDROCHLORIDE 4 MILLIGRAM(S): 2 INJECTION INTRAMUSCULAR; INTRAVENOUS; SUBCUTANEOUS at 21:52

## 2024-05-18 RX ADMIN — Medication 975 MILLIGRAM(S): at 05:31

## 2024-05-18 RX ADMIN — HYDROMORPHONE HYDROCHLORIDE 0.5 MILLIGRAM(S): 2 INJECTION INTRAMUSCULAR; INTRAVENOUS; SUBCUTANEOUS at 09:22

## 2024-05-18 RX ADMIN — GABAPENTIN 300 MILLIGRAM(S): 400 CAPSULE ORAL at 13:21

## 2024-05-18 RX ADMIN — HYDROMORPHONE HYDROCHLORIDE 0.5 MILLIGRAM(S): 2 INJECTION INTRAMUSCULAR; INTRAVENOUS; SUBCUTANEOUS at 04:00

## 2024-05-18 RX ADMIN — POLYETHYLENE GLYCOL 3350 17 GRAM(S): 17 POWDER, FOR SOLUTION ORAL at 13:21

## 2024-05-18 RX ADMIN — HYDROMORPHONE HYDROCHLORIDE 0.5 MILLIGRAM(S): 2 INJECTION INTRAMUSCULAR; INTRAVENOUS; SUBCUTANEOUS at 16:38

## 2024-05-18 RX ADMIN — Medication 975 MILLIGRAM(S): at 19:34

## 2024-05-18 RX ADMIN — HYDROMORPHONE HYDROCHLORIDE 0.5 MILLIGRAM(S): 2 INJECTION INTRAMUSCULAR; INTRAVENOUS; SUBCUTANEOUS at 00:00

## 2024-05-18 RX ADMIN — Medication 1 TABLET(S): at 18:34

## 2024-05-18 RX ADMIN — HYDROMORPHONE HYDROCHLORIDE 0.5 MILLIGRAM(S): 2 INJECTION INTRAMUSCULAR; INTRAVENOUS; SUBCUTANEOUS at 02:32

## 2024-05-18 RX ADMIN — Medication 1 TABLET(S): at 05:18

## 2024-05-18 RX ADMIN — GABAPENTIN 300 MILLIGRAM(S): 400 CAPSULE ORAL at 05:18

## 2024-05-18 NOTE — PROGRESS NOTE ADULT - NS ATTEND AMEND GEN_ALL_CORE FT
Above assessment noted.  The patient was seen and examined by myself with the surgical PA.  The patient is without new events or complaints overnight.  The patient is trauma stable for discharge planning.
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
## 2024-05-18 NOTE — PROGRESS NOTE ADULT - SUBJECTIVE AND OBJECTIVE BOX
ORTHOPEDIC PROGRESS NOTE:    Name: KIERSTEN LIGHT    MR #: [id number]    Procedure: L 2-5 digits and trapezium Closed Reduction Percutaneous Pinning (5/14/24).      DOS: POD #4.     Patient was seen and evaluated at bedside. No acute events reported overnight. Pain is fairly well controlled with medications. No acute orthopedic complaints are expressed at this time. Patient is participating in PT. Patient denies fever, chills, N/T/W.     Vital Signs Last 24 Hrs  T(C): 36.7 (18 May 2024 07:41), Max: 37.2 (17 May 2024 20:16)  T(F): 98.1 (18 May 2024 07:41), Max: 98.9 (17 May 2024 20:16)  HR: 80 (18 May 2024 07:41) (78 - 99)  BP: 126/79 (18 May 2024 07:41) (105/67 - 154/78)  BP(mean): 121 (17 May 2024 11:50) (121 - 121)  RR: 18 (18 May 2024 07:41) (16 - 18)  SpO2: 97% (18 May 2024 07:41) (95% - 99%)    Parameters below as of 18 May 2024 07:41  Patient On (Oxygen Delivery Method): room air      Physical Exam:    General: Pt Alert and oriented, NAD.  LUE: Splint C/D/I. + ROM phalanges, decreased due to pain/splint. ext warm, cap refill brisk. SILT.      A/P: 28M POD #4 L 2-5 digits and trapezium Closed Reduction Percutaneous Pinning (5/14/24).    - Pain Control PRN.   - DVT per primary team.   - PT/OT/OOB.  - Incentive spirometry.   - Weight bearing status: NWB LUE  - Maintain splint and elevate LUE.    ORTHOPEDIC PROGRESS NOTE:    Name: KIERSTEN LIGHT    MR #: [id number]    Procedure: L 2-5 digits and trapezium Closed Reduction Percutaneous Pinning (5/14/24).      DOS: POD #4.     Patient was seen and evaluated at bedside. No acute events reported overnight. Pain is fairly well controlled with medications. No acute orthopedic complaints are expressed at this time. Patient is participating in PT. Patient denies fever, chills, N/T/W.     Vital Signs Last 24 Hrs  T(C): 36.7 (18 May 2024 07:41), Max: 37.2 (17 May 2024 20:16)  T(F): 98.1 (18 May 2024 07:41), Max: 98.9 (17 May 2024 20:16)  HR: 80 (18 May 2024 07:41) (78 - 99)  BP: 126/79 (18 May 2024 07:41) (105/67 - 154/78)  BP(mean): 121 (17 May 2024 11:50) (121 - 121)  RR: 18 (18 May 2024 07:41) (16 - 18)  SpO2: 97% (18 May 2024 07:41) (95% - 99%)    Parameters below as of 18 May 2024 07:41  Patient On (Oxygen Delivery Method): room air      Physical Exam:    General: Pt Alert and oriented, NAD.  LUE: Splint C/D/I. + ROM phalanges, decreased due to pain/splint. ext warm, cap refill brisk. SILT.      A/P: 28M POD #4 L 2-5 digits and trapezium Closed Reduction Percutaneous Pinning (5/14/24).    - Pain Control PRN.   - DVT per primary team.   - PT/OT/OOB.  - Incentive spirometry.   - Weight bearing status: NWB LUE, PWB RUE  - Maintain splint and elevate LUE.

## 2024-05-19 RX ADMIN — Medication 975 MILLIGRAM(S): at 12:37

## 2024-05-19 RX ADMIN — HYDROMORPHONE HYDROCHLORIDE 0.5 MILLIGRAM(S): 2 INJECTION INTRAMUSCULAR; INTRAVENOUS; SUBCUTANEOUS at 15:51

## 2024-05-19 RX ADMIN — Medication 975 MILLIGRAM(S): at 18:00

## 2024-05-19 RX ADMIN — Medication 5 MILLIGRAM(S): at 21:14

## 2024-05-19 RX ADMIN — HYDROMORPHONE HYDROCHLORIDE 4 MILLIGRAM(S): 2 INJECTION INTRAMUSCULAR; INTRAVENOUS; SUBCUTANEOUS at 15:14

## 2024-05-19 RX ADMIN — Medication 975 MILLIGRAM(S): at 11:37

## 2024-05-19 RX ADMIN — Medication 975 MILLIGRAM(S): at 06:29

## 2024-05-19 RX ADMIN — POLYETHYLENE GLYCOL 3350 17 GRAM(S): 17 POWDER, FOR SOLUTION ORAL at 11:37

## 2024-05-19 RX ADMIN — Medication 5 MILLIGRAM(S): at 05:29

## 2024-05-19 RX ADMIN — HYDROMORPHONE HYDROCHLORIDE 0.5 MILLIGRAM(S): 2 INJECTION INTRAMUSCULAR; INTRAVENOUS; SUBCUTANEOUS at 01:40

## 2024-05-19 RX ADMIN — HYDROMORPHONE HYDROCHLORIDE 4 MILLIGRAM(S): 2 INJECTION INTRAMUSCULAR; INTRAVENOUS; SUBCUTANEOUS at 14:14

## 2024-05-19 RX ADMIN — ENOXAPARIN SODIUM 40 MILLIGRAM(S): 100 INJECTION SUBCUTANEOUS at 18:00

## 2024-05-19 RX ADMIN — HYDROMORPHONE HYDROCHLORIDE 0.5 MILLIGRAM(S): 2 INJECTION INTRAMUSCULAR; INTRAVENOUS; SUBCUTANEOUS at 23:30

## 2024-05-19 RX ADMIN — Medication 1 TABLET(S): at 18:00

## 2024-05-19 RX ADMIN — HYDROMORPHONE HYDROCHLORIDE 0.5 MILLIGRAM(S): 2 INJECTION INTRAMUSCULAR; INTRAVENOUS; SUBCUTANEOUS at 08:54

## 2024-05-19 RX ADMIN — HYDROMORPHONE HYDROCHLORIDE 4 MILLIGRAM(S): 2 INJECTION INTRAMUSCULAR; INTRAVENOUS; SUBCUTANEOUS at 21:13

## 2024-05-19 RX ADMIN — HYDROMORPHONE HYDROCHLORIDE 4 MILLIGRAM(S): 2 INJECTION INTRAMUSCULAR; INTRAVENOUS; SUBCUTANEOUS at 22:10

## 2024-05-19 RX ADMIN — Medication 1 TABLET(S): at 05:29

## 2024-05-19 RX ADMIN — ENOXAPARIN SODIUM 40 MILLIGRAM(S): 100 INJECTION SUBCUTANEOUS at 05:30

## 2024-05-19 RX ADMIN — GABAPENTIN 300 MILLIGRAM(S): 400 CAPSULE ORAL at 05:29

## 2024-05-19 RX ADMIN — SENNA PLUS 2 TABLET(S): 8.6 TABLET ORAL at 21:13

## 2024-05-19 RX ADMIN — GABAPENTIN 300 MILLIGRAM(S): 400 CAPSULE ORAL at 21:13

## 2024-05-19 RX ADMIN — HYDROMORPHONE HYDROCHLORIDE 0.5 MILLIGRAM(S): 2 INJECTION INTRAMUSCULAR; INTRAVENOUS; SUBCUTANEOUS at 07:54

## 2024-05-19 RX ADMIN — Medication 975 MILLIGRAM(S): at 01:40

## 2024-05-19 RX ADMIN — Medication 975 MILLIGRAM(S): at 19:00

## 2024-05-19 RX ADMIN — GABAPENTIN 300 MILLIGRAM(S): 400 CAPSULE ORAL at 14:15

## 2024-05-19 RX ADMIN — HYDROMORPHONE HYDROCHLORIDE 0.5 MILLIGRAM(S): 2 INJECTION INTRAMUSCULAR; INTRAVENOUS; SUBCUTANEOUS at 00:40

## 2024-05-19 RX ADMIN — Medication 975 MILLIGRAM(S): at 05:29

## 2024-05-19 RX ADMIN — HYDROMORPHONE HYDROCHLORIDE 0.5 MILLIGRAM(S): 2 INJECTION INTRAMUSCULAR; INTRAVENOUS; SUBCUTANEOUS at 22:21

## 2024-05-19 RX ADMIN — Medication 975 MILLIGRAM(S): at 00:30

## 2024-05-19 RX ADMIN — HYDROMORPHONE HYDROCHLORIDE 0.5 MILLIGRAM(S): 2 INJECTION INTRAMUSCULAR; INTRAVENOUS; SUBCUTANEOUS at 18:51
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
## 2024-05-20 PROCEDURE — 99232 SBSQ HOSP IP/OBS MODERATE 35: CPT

## 2024-05-20 RX ADMIN — Medication 975 MILLIGRAM(S): at 19:00

## 2024-05-20 RX ADMIN — SENNA PLUS 2 TABLET(S): 8.6 TABLET ORAL at 21:01

## 2024-05-20 RX ADMIN — Medication 975 MILLIGRAM(S): at 12:37

## 2024-05-20 RX ADMIN — HYDROMORPHONE HYDROCHLORIDE 4 MILLIGRAM(S): 2 INJECTION INTRAMUSCULAR; INTRAVENOUS; SUBCUTANEOUS at 11:07

## 2024-05-20 RX ADMIN — GABAPENTIN 300 MILLIGRAM(S): 400 CAPSULE ORAL at 21:01

## 2024-05-20 RX ADMIN — HYDROMORPHONE HYDROCHLORIDE 4 MILLIGRAM(S): 2 INJECTION INTRAMUSCULAR; INTRAVENOUS; SUBCUTANEOUS at 05:19

## 2024-05-20 RX ADMIN — HYDROMORPHONE HYDROCHLORIDE 4 MILLIGRAM(S): 2 INJECTION INTRAMUSCULAR; INTRAVENOUS; SUBCUTANEOUS at 22:00

## 2024-05-20 RX ADMIN — Medication 975 MILLIGRAM(S): at 17:33

## 2024-05-20 RX ADMIN — Medication 975 MILLIGRAM(S): at 06:21

## 2024-05-20 RX ADMIN — GABAPENTIN 300 MILLIGRAM(S): 400 CAPSULE ORAL at 05:19

## 2024-05-20 RX ADMIN — Medication 975 MILLIGRAM(S): at 01:27

## 2024-05-20 RX ADMIN — ENOXAPARIN SODIUM 40 MILLIGRAM(S): 100 INJECTION SUBCUTANEOUS at 17:32

## 2024-05-20 RX ADMIN — HYDROMORPHONE HYDROCHLORIDE 0.5 MILLIGRAM(S): 2 INJECTION INTRAMUSCULAR; INTRAVENOUS; SUBCUTANEOUS at 23:47

## 2024-05-20 RX ADMIN — HYDROMORPHONE HYDROCHLORIDE 4 MILLIGRAM(S): 2 INJECTION INTRAMUSCULAR; INTRAVENOUS; SUBCUTANEOUS at 10:07

## 2024-05-20 RX ADMIN — Medication 975 MILLIGRAM(S): at 00:16

## 2024-05-20 RX ADMIN — POLYETHYLENE GLYCOL 3350 17 GRAM(S): 17 POWDER, FOR SOLUTION ORAL at 11:37

## 2024-05-20 RX ADMIN — HYDROMORPHONE HYDROCHLORIDE 4 MILLIGRAM(S): 2 INJECTION INTRAMUSCULAR; INTRAVENOUS; SUBCUTANEOUS at 21:01

## 2024-05-20 RX ADMIN — ENOXAPARIN SODIUM 40 MILLIGRAM(S): 100 INJECTION SUBCUTANEOUS at 05:19

## 2024-05-20 RX ADMIN — HYDROMORPHONE HYDROCHLORIDE 0.5 MILLIGRAM(S): 2 INJECTION INTRAMUSCULAR; INTRAVENOUS; SUBCUTANEOUS at 12:39

## 2024-05-20 RX ADMIN — Medication 975 MILLIGRAM(S): at 11:37

## 2024-05-20 RX ADMIN — HYDROMORPHONE HYDROCHLORIDE 0.5 MILLIGRAM(S): 2 INJECTION INTRAMUSCULAR; INTRAVENOUS; SUBCUTANEOUS at 22:24

## 2024-05-20 RX ADMIN — Medication 5 MILLIGRAM(S): at 21:01

## 2024-05-20 RX ADMIN — HYDROMORPHONE HYDROCHLORIDE 0.5 MILLIGRAM(S): 2 INJECTION INTRAMUSCULAR; INTRAVENOUS; SUBCUTANEOUS at 13:39

## 2024-05-20 RX ADMIN — GABAPENTIN 300 MILLIGRAM(S): 400 CAPSULE ORAL at 14:19

## 2024-05-20 RX ADMIN — Medication 1 TABLET(S): at 05:19

## 2024-05-20 RX ADMIN — HYDROMORPHONE HYDROCHLORIDE 4 MILLIGRAM(S): 2 INJECTION INTRAMUSCULAR; INTRAVENOUS; SUBCUTANEOUS at 06:21

## 2024-05-20 RX ADMIN — Medication 975 MILLIGRAM(S): at 05:18

## 2024-05-20 RX ADMIN — Medication 1 TABLET(S): at 17:33
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
## 2024-05-21 PROCEDURE — 99232 SBSQ HOSP IP/OBS MODERATE 35: CPT

## 2024-05-21 RX ADMIN — Medication 975 MILLIGRAM(S): at 05:52

## 2024-05-21 RX ADMIN — HYDROMORPHONE HYDROCHLORIDE 4 MILLIGRAM(S): 2 INJECTION INTRAMUSCULAR; INTRAVENOUS; SUBCUTANEOUS at 12:39

## 2024-05-21 RX ADMIN — HYDROMORPHONE HYDROCHLORIDE 0.5 MILLIGRAM(S): 2 INJECTION INTRAMUSCULAR; INTRAVENOUS; SUBCUTANEOUS at 23:35

## 2024-05-21 RX ADMIN — Medication 975 MILLIGRAM(S): at 23:34

## 2024-05-21 RX ADMIN — Medication 975 MILLIGRAM(S): at 12:39

## 2024-05-21 RX ADMIN — Medication 975 MILLIGRAM(S): at 01:00

## 2024-05-21 RX ADMIN — HYDROMORPHONE HYDROCHLORIDE 4 MILLIGRAM(S): 2 INJECTION INTRAMUSCULAR; INTRAVENOUS; SUBCUTANEOUS at 05:52

## 2024-05-21 RX ADMIN — POLYETHYLENE GLYCOL 3350 17 GRAM(S): 17 POWDER, FOR SOLUTION ORAL at 12:39

## 2024-05-21 RX ADMIN — HYDROMORPHONE HYDROCHLORIDE 4 MILLIGRAM(S): 2 INJECTION INTRAMUSCULAR; INTRAVENOUS; SUBCUTANEOUS at 13:39

## 2024-05-21 RX ADMIN — HYDROMORPHONE HYDROCHLORIDE 4 MILLIGRAM(S): 2 INJECTION INTRAMUSCULAR; INTRAVENOUS; SUBCUTANEOUS at 21:47

## 2024-05-21 RX ADMIN — GABAPENTIN 300 MILLIGRAM(S): 400 CAPSULE ORAL at 21:55

## 2024-05-21 RX ADMIN — ENOXAPARIN SODIUM 40 MILLIGRAM(S): 100 INJECTION SUBCUTANEOUS at 18:28

## 2024-05-21 RX ADMIN — HYDROMORPHONE HYDROCHLORIDE 0.5 MILLIGRAM(S): 2 INJECTION INTRAMUSCULAR; INTRAVENOUS; SUBCUTANEOUS at 14:31

## 2024-05-21 RX ADMIN — ENOXAPARIN SODIUM 40 MILLIGRAM(S): 100 INJECTION SUBCUTANEOUS at 05:52

## 2024-05-21 RX ADMIN — Medication 975 MILLIGRAM(S): at 18:27

## 2024-05-21 RX ADMIN — GABAPENTIN 300 MILLIGRAM(S): 400 CAPSULE ORAL at 05:52

## 2024-05-21 RX ADMIN — Medication 975 MILLIGRAM(S): at 13:39

## 2024-05-21 RX ADMIN — Medication 975 MILLIGRAM(S): at 00:06

## 2024-05-21 RX ADMIN — HYDROMORPHONE HYDROCHLORIDE 0.5 MILLIGRAM(S): 2 INJECTION INTRAMUSCULAR; INTRAVENOUS; SUBCUTANEOUS at 08:25

## 2024-05-21 RX ADMIN — SENNA PLUS 2 TABLET(S): 8.6 TABLET ORAL at 21:55

## 2024-05-21 RX ADMIN — HYDROMORPHONE HYDROCHLORIDE 4 MILLIGRAM(S): 2 INJECTION INTRAMUSCULAR; INTRAVENOUS; SUBCUTANEOUS at 06:33

## 2024-05-21 RX ADMIN — HYDROMORPHONE HYDROCHLORIDE 4 MILLIGRAM(S): 2 INJECTION INTRAMUSCULAR; INTRAVENOUS; SUBCUTANEOUS at 22:47

## 2024-05-21 RX ADMIN — HYDROMORPHONE HYDROCHLORIDE 0.5 MILLIGRAM(S): 2 INJECTION INTRAMUSCULAR; INTRAVENOUS; SUBCUTANEOUS at 14:45

## 2024-05-21 RX ADMIN — HYDROMORPHONE HYDROCHLORIDE 0.5 MILLIGRAM(S): 2 INJECTION INTRAMUSCULAR; INTRAVENOUS; SUBCUTANEOUS at 07:55

## 2024-05-21 RX ADMIN — Medication 975 MILLIGRAM(S): at 18:57

## 2024-05-21 RX ADMIN — LACTULOSE 20 GRAM(S): 10 SOLUTION ORAL at 18:30

## 2024-05-21 RX ADMIN — Medication 1 TABLET(S): at 05:52

## 2024-05-21 RX ADMIN — Medication 975 MILLIGRAM(S): at 06:33

## 2024-05-21 RX ADMIN — GABAPENTIN 300 MILLIGRAM(S): 400 CAPSULE ORAL at 12:39
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
## 2024-05-21 NOTE — PROGRESS NOTE ADULT - SUBJECTIVE AND OBJECTIVE BOX
ORTHOPEDIC PROGRESS NOTE:    Name: KIERSTEN LIGHT    MR #: [id number]    Procedure: open reduction internal fixation pubic symphysis, Closed Reduction Percutaneous Pinning sacrum  DOS: 5/7/24        Patient was seen and evaluated at bedside. No acute events reported overnight. Pain is well controlled with medications. No acute orthopedic complaints are expressed at this time. Patient is participating in PT. Patient denies fever, chills, numbness or tingling.     ICU Vital Signs Last 24 Hrs  T(C): 36.8 (21 May 2024 07:46), Max: 36.8 (21 May 2024 00:05)  T(F): 98.2 (21 May 2024 07:46), Max: 98.2 (21 May 2024 00:05)  HR: 66 (21 May 2024 07:46) (66 - 89)  BP: 123/73 (21 May 2024 07:46) (112/70 - 144/74)  BP(mean): --  ABP: --  ABP(mean): --  RR: 18 (21 May 2024 07:46) (16 - 20)  SpO2: 97% (21 May 2024 07:46) (97% - 99%)    O2 Parameters below as of 21 May 2024 04:36  Patient On (Oxygen Delivery Method): room air            Physical Exam:    General: Pt Alert and oriented, NAD.  LUE: Splint C/D/I. splint removed, pin sites c/d/i- pin sites cleaned with betadine solution, xeroform applied to pin sites, gauze and webril and volar splint placed, ace wrap applied. + ROM phalanges. ext warm, cap refill brisk. SILT. radial pulse 2+. elbow with minimal TTP, + ecchymosis, ROM intact.     RLE/LLE: ROM hip/knee/ankle intact. compartments soft compressible, DP pulse, SILT. left sacrum and anterior pubic dressing removed, staples intact, no drainage or discharge, area cleaned with betadine pad, staples removed without complication-steristrips placed.       A/P: 28M  Patient s/p open reduction internal fixation pubic symphysis, Closed Reduction Percutaneous Pinning sacrum  being treated by Dr. Esqueda (5/7/24).  Left 2-5 digits and trapezium Closed Reduction Percutaneous Pinning (5/14/24) by Dr. Mccauley.    - case and plan d/w Dr. Esqueda  - Pain Control PRN  - DVT per primary team  - PT/OT  - Incentive spirometry  - Weight bearing status: NWB LUE, PWB RUE, WBAT RLE, TTWB LLE  - Maintain splint and elevate LUE  - continue rest of care per primary team  - orthopedically stable for discharge

## 2024-05-21 NOTE — PROGRESS NOTE ADULT - SUBJECTIVE AND OBJECTIVE BOX
ORTHOPEDIC PROGRESS NOTE:    Name: KIERSTEN LIGHT    MR #: [id number]    Procedure: Left 2-5 digits and trapezium Closed Reduction Percutaneous Pinning (5/14/24)  DOS: POD #7        Patient was seen and evaluated at bedside. No acute events reported overnight. Pain is well controlled with medications. No acute orthopedic complaints are expressed at this time. Patient is participating in PT. Patient denies fever, chills, numbness or tingling.     ICU Vital Signs Last 24 Hrs  T(C): 36.8 (21 May 2024 07:46), Max: 36.8 (21 May 2024 00:05)  T(F): 98.2 (21 May 2024 07:46), Max: 98.2 (21 May 2024 00:05)  HR: 66 (21 May 2024 07:46) (66 - 89)  BP: 123/73 (21 May 2024 07:46) (112/70 - 144/74)  BP(mean): --  ABP: --  ABP(mean): --  RR: 18 (21 May 2024 07:46) (16 - 20)  SpO2: 97% (21 May 2024 07:46) (97% - 99%)    O2 Parameters below as of 21 May 2024 04:36  Patient On (Oxygen Delivery Method): room air            Physical Exam:    General: Pt Alert and oriented, NAD.  LUE: Splint C/D/I. splint removed, pin sites c/d/i- pin sites cleaned with betadine solution, xeroform applied to pin sites, gauze and webril and volar splint placed, ace wrap applied. + ROM phalanges. ext warm, cap refill brisk. SILT. radial pulse 2+. elbow with minimal TTP, + ecchymosis, ROM intact.       A/P: 28M POD #4 L 2-5 digits and trapezium Closed Reduction Percutaneous Pinning (5/14/24) by Dr. Mccauley. Patient s/p open reduction internal fixation pubic symphysis, Closed Reduction Percutaneous Pinning sacrum  being treated by Dr. Esqueda (5/7/24)    - case and plan d/w Dr. Mccauley  - Pain Control PRN  - DVT per primary team  - PT  - begin OT today for left finger/elbow ROM  - Incentive spirometry.   - Weight bearing status: NWB LUE, PWB RUE, WBAT RLE, TTWB LLE  - Maintain splint and elevate LUE  - continue rest of care per primary team ORTHOPEDIC PROGRESS NOTE:    Name: KIERSTEN LIGHT    MR #: [id number]    Procedure: Left 2-5 digits and trapezium Closed Reduction Percutaneous Pinning (5/14/24)  DOS: POD #7        Patient was seen and evaluated at bedside. No acute events reported overnight. Pain is well controlled with medications. No acute orthopedic complaints are expressed at this time. Patient is participating in PT. Patient denies fever, chills, numbness or tingling.     ICU Vital Signs Last 24 Hrs  T(C): 36.8 (21 May 2024 07:46), Max: 36.8 (21 May 2024 00:05)  T(F): 98.2 (21 May 2024 07:46), Max: 98.2 (21 May 2024 00:05)  HR: 66 (21 May 2024 07:46) (66 - 89)  BP: 123/73 (21 May 2024 07:46) (112/70 - 144/74)  BP(mean): --  ABP: --  ABP(mean): --  RR: 18 (21 May 2024 07:46) (16 - 20)  SpO2: 97% (21 May 2024 07:46) (97% - 99%)    O2 Parameters below as of 21 May 2024 04:36  Patient On (Oxygen Delivery Method): room air            Physical Exam:    General: Pt Alert and oriented, NAD.  LUE: Splint C/D/I. splint removed, pin sites c/d/i- pin sites cleaned with betadine solution, xeroform applied to pin sites, gauze and webril and volar splint placed, ace wrap applied. + ROM phalanges. ext warm, cap refill brisk. SILT. radial pulse 2+. elbow with minimal TTP, + ecchymosis, ROM intact.       A/P: 28M Left 2-5 digits and trapezium Closed Reduction Percutaneous Pinning (5/14/24) by Dr. Mccauley. Patient s/p open reduction internal fixation pubic symphysis, Closed Reduction Percutaneous Pinning sacrum  being treated by Dr. Esqueda (5/7/24)    - case and plan d/w Dr. Mccauley  - Pain Control PRN  - DVT per primary team  - PT  - begin OT today for left finger/elbow ROM  - Incentive spirometry.   - Weight bearing status: NWB LUE, PWB RUE, WBAT RLE, TTWB LLE  - Maintain splint and elevate LUE  - continue rest of care per primary team  - orthopedically stable for discharge

## 2024-05-22 PROCEDURE — 99232 SBSQ HOSP IP/OBS MODERATE 35: CPT

## 2024-05-22 RX ORDER — KETOROLAC TROMETHAMINE 30 MG/ML
15 SYRINGE (ML) INJECTION ONCE
Refills: 0 | Status: DISCONTINUED | OUTPATIENT
Start: 2024-05-22 | End: 2024-05-22

## 2024-05-22 RX ADMIN — Medication 975 MILLIGRAM(S): at 05:55

## 2024-05-22 RX ADMIN — HYDROMORPHONE HYDROCHLORIDE 4 MILLIGRAM(S): 2 INJECTION INTRAMUSCULAR; INTRAVENOUS; SUBCUTANEOUS at 12:37

## 2024-05-22 RX ADMIN — GABAPENTIN 300 MILLIGRAM(S): 400 CAPSULE ORAL at 05:55

## 2024-05-22 RX ADMIN — GABAPENTIN 300 MILLIGRAM(S): 400 CAPSULE ORAL at 12:37

## 2024-05-22 RX ADMIN — HYDROMORPHONE HYDROCHLORIDE 4 MILLIGRAM(S): 2 INJECTION INTRAMUSCULAR; INTRAVENOUS; SUBCUTANEOUS at 23:29

## 2024-05-22 RX ADMIN — Medication 15 MILLIGRAM(S): at 15:08

## 2024-05-22 RX ADMIN — Medication 5 MILLIGRAM(S): at 17:08

## 2024-05-22 RX ADMIN — HYDROMORPHONE HYDROCHLORIDE 0.5 MILLIGRAM(S): 2 INJECTION INTRAMUSCULAR; INTRAVENOUS; SUBCUTANEOUS at 00:35

## 2024-05-22 RX ADMIN — HYDROMORPHONE HYDROCHLORIDE 4 MILLIGRAM(S): 2 INJECTION INTRAMUSCULAR; INTRAVENOUS; SUBCUTANEOUS at 04:31

## 2024-05-22 RX ADMIN — SENNA PLUS 2 TABLET(S): 8.6 TABLET ORAL at 22:30

## 2024-05-22 RX ADMIN — Medication 975 MILLIGRAM(S): at 06:55

## 2024-05-22 RX ADMIN — Medication 975 MILLIGRAM(S): at 12:36

## 2024-05-22 RX ADMIN — ENOXAPARIN SODIUM 40 MILLIGRAM(S): 100 INJECTION SUBCUTANEOUS at 17:12

## 2024-05-22 RX ADMIN — Medication 975 MILLIGRAM(S): at 17:10

## 2024-05-22 RX ADMIN — Medication 975 MILLIGRAM(S): at 00:34

## 2024-05-22 RX ADMIN — GABAPENTIN 300 MILLIGRAM(S): 400 CAPSULE ORAL at 22:30

## 2024-05-22 RX ADMIN — ENOXAPARIN SODIUM 40 MILLIGRAM(S): 100 INJECTION SUBCUTANEOUS at 06:07

## 2024-05-22 RX ADMIN — HYDROMORPHONE HYDROCHLORIDE 4 MILLIGRAM(S): 2 INJECTION INTRAMUSCULAR; INTRAVENOUS; SUBCUTANEOUS at 22:29

## 2024-05-22 RX ADMIN — HYDROMORPHONE HYDROCHLORIDE 4 MILLIGRAM(S): 2 INJECTION INTRAMUSCULAR; INTRAVENOUS; SUBCUTANEOUS at 03:31

## 2024-05-22 NOTE — PROGRESS NOTE ADULT - SUBJECTIVE AND OBJECTIVE BOX
INTERVAL HPI/OVERNIGHT EVENTS: Patient's splint was changed by Ortho last night. He has no complaints this morning.      MEDICATIONS  (STANDING):  acetaminophen     Tablet .. 975 milliGRAM(s) Oral every 6 hours  enoxaparin Injectable 40 milliGRAM(s) SubCutaneous every 12 hours  gabapentin 300 milliGRAM(s) Oral every 8 hours  lidocaine   4% Patch 2 Patch Transdermal daily  polyethylene glycol 3350 17 Gram(s) Oral daily  senna 2 Tablet(s) Oral at bedtime    MEDICATIONS  (PRN):  HYDROmorphone   Tablet 2 milliGRAM(s) Oral every 4 hours PRN Moderate Pain (4 - 6)  HYDROmorphone   Tablet 4 milliGRAM(s) Oral every 4 hours PRN Severe Pain (7 - 10)  lidocaine 2% Jelly 5 milliLiter(s) IntraUrethral three times a day PRN discomfort at meatus from catheter  melatonin 5 milliGRAM(s) Oral at bedtime PRN Sleep  oxybutynin 5 milliGRAM(s) Oral every 8 hours PRN Bladder spasms      Vital Signs Last 24 Hrs  T(C): 36.8 (22 May 2024 04:00), Max: 37 (21 May 2024 21:15)  T(F): 98.2 (22 May 2024 04:00), Max: 98.6 (21 May 2024 21:15)  HR: 62 (22 May 2024 05:52) (62 - 83)  BP: 125/74 (22 May 2024 05:52) (113/74 - 140/74)  BP(mean): 91 (21 May 2024 16:35) (91 - 91)  RR: 16 (22 May 2024 04:00) (16 - 18)  SpO2: 98% (22 May 2024 04:00) (97% - 98%)    Parameters below as of 22 May 2024 04:00  Patient On (Oxygen Delivery Method): room air        Physical Exam:    Respiratory:  no accessory muscle use    Cardiovascular: Regular rate     Gastrointestinal: Soft, non-tender, no rebound tenderness or guarding    Extremities: L arm in splint      I&O's Detail    21 May 2024 07:01  -  22 May 2024 07:00  --------------------------------------------------------  IN:  Total IN: 0 mL    OUT:    Indwelling Catheter - Urethral (mL): 300 mL  Total OUT: 300 mL    Total NET: -300 mL          LABS:                RADIOLOGY & ADDITIONAL STUDIES:
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
## 2024-05-23 PROCEDURE — 99232 SBSQ HOSP IP/OBS MODERATE 35: CPT

## 2024-05-23 RX ORDER — HYDROMORPHONE HYDROCHLORIDE 2 MG/ML
4 INJECTION INTRAMUSCULAR; INTRAVENOUS; SUBCUTANEOUS EVERY 4 HOURS
Refills: 0 | Status: DISCONTINUED | OUTPATIENT
Start: 2024-05-23 | End: 2024-05-30

## 2024-05-23 RX ORDER — HYDROMORPHONE HYDROCHLORIDE 2 MG/ML
2 INJECTION INTRAMUSCULAR; INTRAVENOUS; SUBCUTANEOUS EVERY 4 HOURS
Refills: 0 | Status: DISCONTINUED | OUTPATIENT
Start: 2024-05-23 | End: 2024-05-29

## 2024-05-23 RX ADMIN — HYDROMORPHONE HYDROCHLORIDE 4 MILLIGRAM(S): 2 INJECTION INTRAMUSCULAR; INTRAVENOUS; SUBCUTANEOUS at 12:50

## 2024-05-23 RX ADMIN — GABAPENTIN 300 MILLIGRAM(S): 400 CAPSULE ORAL at 22:05

## 2024-05-23 RX ADMIN — SENNA PLUS 2 TABLET(S): 8.6 TABLET ORAL at 22:05

## 2024-05-23 RX ADMIN — HYDROMORPHONE HYDROCHLORIDE 4 MILLIGRAM(S): 2 INJECTION INTRAMUSCULAR; INTRAVENOUS; SUBCUTANEOUS at 10:52

## 2024-05-23 RX ADMIN — GABAPENTIN 300 MILLIGRAM(S): 400 CAPSULE ORAL at 05:54

## 2024-05-23 RX ADMIN — Medication 975 MILLIGRAM(S): at 06:54

## 2024-05-23 RX ADMIN — Medication 975 MILLIGRAM(S): at 01:37

## 2024-05-23 RX ADMIN — Medication 975 MILLIGRAM(S): at 16:44

## 2024-05-23 RX ADMIN — ENOXAPARIN SODIUM 40 MILLIGRAM(S): 100 INJECTION SUBCUTANEOUS at 17:43

## 2024-05-23 RX ADMIN — Medication 5 MILLIGRAM(S): at 22:05

## 2024-05-23 RX ADMIN — ENOXAPARIN SODIUM 40 MILLIGRAM(S): 100 INJECTION SUBCUTANEOUS at 05:54

## 2024-05-23 RX ADMIN — Medication 975 MILLIGRAM(S): at 10:52

## 2024-05-23 RX ADMIN — Medication 975 MILLIGRAM(S): at 00:37

## 2024-05-23 RX ADMIN — Medication 975 MILLIGRAM(S): at 05:54

## 2024-05-23 RX ADMIN — Medication 975 MILLIGRAM(S): at 12:48

## 2024-05-23 RX ADMIN — Medication 975 MILLIGRAM(S): at 17:59
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
## 2024-05-23 NOTE — PROGRESS NOTE ADULT - SUBJECTIVE AND OBJECTIVE BOX
INTERVAL HPI/OVERNIGHT EVENTS: Patient slept well. Denies any pain. Tolerating regular diet. +BM yesterday. Louis in place, UOP 2.4L.         MEDICATIONS  (STANDING):  acetaminophen     Tablet .. 975 milliGRAM(s) Oral every 6 hours  enoxaparin Injectable 40 milliGRAM(s) SubCutaneous every 12 hours  gabapentin 300 milliGRAM(s) Oral every 8 hours  lidocaine   4% Patch 2 Patch Transdermal daily  polyethylene glycol 3350 17 Gram(s) Oral daily  senna 2 Tablet(s) Oral at bedtime    MEDICATIONS  (PRN):  HYDROmorphone   Tablet 2 milliGRAM(s) Oral every 4 hours PRN Moderate Pain (4 - 6)  HYDROmorphone   Tablet 4 milliGRAM(s) Oral every 4 hours PRN Severe Pain (7 - 10)  lidocaine 2% Jelly 5 milliLiter(s) IntraUrethral three times a day PRN discomfort at meatus from catheter  melatonin 5 milliGRAM(s) Oral at bedtime PRN Sleep  oxybutynin 5 milliGRAM(s) Oral every 8 hours PRN Bladder spasms      Vital Signs Last 24 Hrs  T(C): 36.9 (23 May 2024 05:00), Max: 37 (22 May 2024 22:15)  T(F): 98.5 (23 May 2024 05:00), Max: 98.6 (22 May 2024 22:15)  HR: 74 (23 May 2024 05:00) (65 - 86)  BP: 121/70 (23 May 2024 05:00) (118/72 - 157/66)  BP(mean): 101 (22 May 2024 16:39) (101 - 101)  RR: 18 (23 May 2024 05:00) (17 - 18)  SpO2: 97% (23 May 2024 05:00) (97% - 99%)    Parameters below as of 23 May 2024 05:00  Patient On (Oxygen Delivery Method): BiPAP/CPAP        Physical Exam:  Cons: NAD, resting comfortably     Neurological:  No neuro deficits     Respiratory: Unlabored, no accessory muscle use    Cardiovascular: NSR    Gastrointestinal: Soft, nttp, nd     : louis    Ext: LUE splint     Skin: No rashes      I&O's Detail    22 May 2024 07:01  -  23 May 2024 07:00  --------------------------------------------------------  IN:    Oral Fluid: 450 mL  Total IN: 450 mL    OUT:    Indwelling Catheter - Urethral (mL): 2400 mL  Total OUT: 2400 mL    Total NET: -1950 mL          LABS:                RADIOLOGY & ADDITIONAL STUDIES:

## 2024-05-24 PROCEDURE — 72170 X-RAY EXAM OF PELVIS: CPT | Mod: 26

## 2024-05-24 PROCEDURE — 99232 SBSQ HOSP IP/OBS MODERATE 35: CPT

## 2024-05-24 RX ORDER — OXYBUTYNIN CHLORIDE 5 MG
5 TABLET ORAL
Refills: 0 | Status: DISCONTINUED | OUTPATIENT
Start: 2024-05-24 | End: 2024-05-26

## 2024-05-24 RX ADMIN — HYDROMORPHONE HYDROCHLORIDE 2 MILLIGRAM(S): 2 INJECTION INTRAMUSCULAR; INTRAVENOUS; SUBCUTANEOUS at 22:27

## 2024-05-24 RX ADMIN — SENNA PLUS 2 TABLET(S): 8.6 TABLET ORAL at 22:28

## 2024-05-24 RX ADMIN — Medication 975 MILLIGRAM(S): at 06:13

## 2024-05-24 RX ADMIN — Medication 975 MILLIGRAM(S): at 16:59

## 2024-05-24 RX ADMIN — GABAPENTIN 300 MILLIGRAM(S): 400 CAPSULE ORAL at 14:30

## 2024-05-24 RX ADMIN — Medication 5 MILLIGRAM(S): at 17:00

## 2024-05-24 RX ADMIN — HYDROMORPHONE HYDROCHLORIDE 2 MILLIGRAM(S): 2 INJECTION INTRAMUSCULAR; INTRAVENOUS; SUBCUTANEOUS at 23:00

## 2024-05-24 RX ADMIN — GABAPENTIN 300 MILLIGRAM(S): 400 CAPSULE ORAL at 22:27

## 2024-05-24 RX ADMIN — GABAPENTIN 300 MILLIGRAM(S): 400 CAPSULE ORAL at 06:13

## 2024-05-24 RX ADMIN — Medication 975 MILLIGRAM(S): at 17:50

## 2024-05-24 RX ADMIN — Medication 975 MILLIGRAM(S): at 12:08

## 2024-05-24 RX ADMIN — Medication 975 MILLIGRAM(S): at 11:13

## 2024-05-24 RX ADMIN — Medication 5 MILLIGRAM(S): at 14:03

## 2024-05-24 RX ADMIN — HYDROMORPHONE HYDROCHLORIDE 4 MILLIGRAM(S): 2 INJECTION INTRAMUSCULAR; INTRAVENOUS; SUBCUTANEOUS at 00:18

## 2024-05-24 RX ADMIN — ENOXAPARIN SODIUM 40 MILLIGRAM(S): 100 INJECTION SUBCUTANEOUS at 17:00

## 2024-05-24 RX ADMIN — ENOXAPARIN SODIUM 40 MILLIGRAM(S): 100 INJECTION SUBCUTANEOUS at 06:14

## 2024-05-24 RX ADMIN — Medication 975 MILLIGRAM(S): at 06:47

## 2024-05-24 NOTE — PROGRESS NOTE ADULT - SUBJECTIVE AND OBJECTIVE BOX
New x-rays reviewed with Dr. Esqueda   Updated Weight bearing status - patient will be WBAT for the bilateral lower extremities for TRANSFERS ONLY. All other times, patient with be TTWB of the LLE and WBAT of the RLE   Will likely advance to full WBAT one month post op

## 2024-05-24 NOTE — PROGRESS NOTE ADULT - SUBJECTIVE AND OBJECTIVE BOX
Subjective: Patient was seen and examined this AM. Resting comfortably in bed with no acute events or complaints overnight. Louis remains in place with some sediment, but continues to function well with clear, yellow urine. Tolerating regular diet. Pain is well controlled.     MEDICATIONS  (STANDING):  acetaminophen     Tablet .. 975 milliGRAM(s) Oral every 6 hours  enoxaparin Injectable 40 milliGRAM(s) SubCutaneous every 12 hours  gabapentin 300 milliGRAM(s) Oral every 8 hours  lidocaine   4% Patch 2 Patch Transdermal daily  polyethylene glycol 3350 17 Gram(s) Oral daily  senna 2 Tablet(s) Oral at bedtime    MEDICATIONS  (PRN):  HYDROmorphone   Tablet 2 milliGRAM(s) Oral every 4 hours PRN Moderate Pain (4 - 6)  HYDROmorphone   Tablet 4 milliGRAM(s) Oral every 4 hours PRN Severe Pain (7 - 10)  lidocaine 2% Jelly 5 milliLiter(s) IntraUrethral three times a day PRN discomfort at meatus from catheter  melatonin 5 milliGRAM(s) Oral at bedtime PRN Sleep  oxybutynin 5 milliGRAM(s) Oral every 8 hours PRN Bladder spasms      Vital Signs Last 24 Hrs  T(C): 36.8 (24 May 2024 08:00), Max: 37.1 (23 May 2024 20:00)  T(F): 98.2 (24 May 2024 08:00), Max: 98.8 (23 May 2024 20:00)  HR: 66 (24 May 2024 08:00) (64 - 75)  BP: 107/70 (24 May 2024 08:00) (107/70 - 145/67)  BP(mean): --  RR: 18 (24 May 2024 08:00) (16 - 18)  SpO2: 97% (24 May 2024 08:00) (97% - 100%)    Parameters below as of 24 May 2024 08:00  Patient On (Oxygen Delivery Method): room air      05-23 - 05-24  --------------------------------------------------------  IN:    Oral Fluid: 1100 mL  Total IN: 1100 mL    OUT:    Indwelling Catheter - Urethral (mL): 2400 mL  Total OUT: 2400 mL    Total NET: -1300 mL      05-24 - 05-24  --------------------------------------------------------  IN:    Oral Fluid: 200 mL  Total IN: 200 mL    OUT:  Total OUT: 0 mL    Total NET: 200 mL      Physical Exam:    Cons: NAD, resting comfortably   Neurological:  No neuro deficits   Respiratory: Unlabored, no accessory muscle use  Cardiovascular: NSR  Gastrointestinal: Soft, nttp, nd   : louis in place  Ext: LUE splint   Skin: No rashes
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
## 2024-05-25 PROCEDURE — 99232 SBSQ HOSP IP/OBS MODERATE 35: CPT

## 2024-05-25 RX ORDER — HYDROMORPHONE HYDROCHLORIDE 2 MG/ML
1 INJECTION INTRAMUSCULAR; INTRAVENOUS; SUBCUTANEOUS ONCE
Refills: 0 | Status: DISCONTINUED | OUTPATIENT
Start: 2024-05-25 | End: 2024-05-25

## 2024-05-25 RX ADMIN — Medication 975 MILLIGRAM(S): at 07:00

## 2024-05-25 RX ADMIN — Medication 975 MILLIGRAM(S): at 01:40

## 2024-05-25 RX ADMIN — HYDROMORPHONE HYDROCHLORIDE 4 MILLIGRAM(S): 2 INJECTION INTRAMUSCULAR; INTRAVENOUS; SUBCUTANEOUS at 22:00

## 2024-05-25 RX ADMIN — HYDROMORPHONE HYDROCHLORIDE 1 MILLIGRAM(S): 2 INJECTION INTRAMUSCULAR; INTRAVENOUS; SUBCUTANEOUS at 01:38

## 2024-05-25 RX ADMIN — ENOXAPARIN SODIUM 40 MILLIGRAM(S): 100 INJECTION SUBCUTANEOUS at 17:30

## 2024-05-25 RX ADMIN — HYDROMORPHONE HYDROCHLORIDE 1 MILLIGRAM(S): 2 INJECTION INTRAMUSCULAR; INTRAVENOUS; SUBCUTANEOUS at 23:28

## 2024-05-25 RX ADMIN — HYDROMORPHONE HYDROCHLORIDE 4 MILLIGRAM(S): 2 INJECTION INTRAMUSCULAR; INTRAVENOUS; SUBCUTANEOUS at 21:39

## 2024-05-25 RX ADMIN — Medication 975 MILLIGRAM(S): at 06:14

## 2024-05-25 RX ADMIN — GABAPENTIN 300 MILLIGRAM(S): 400 CAPSULE ORAL at 06:15

## 2024-05-25 RX ADMIN — SENNA PLUS 2 TABLET(S): 8.6 TABLET ORAL at 21:39

## 2024-05-25 RX ADMIN — Medication 5 MILLIGRAM(S): at 17:30

## 2024-05-25 RX ADMIN — Medication 5 MILLIGRAM(S): at 00:41

## 2024-05-25 RX ADMIN — GABAPENTIN 300 MILLIGRAM(S): 400 CAPSULE ORAL at 21:39

## 2024-05-25 RX ADMIN — HYDROMORPHONE HYDROCHLORIDE 1 MILLIGRAM(S): 2 INJECTION INTRAMUSCULAR; INTRAVENOUS; SUBCUTANEOUS at 02:38

## 2024-05-25 RX ADMIN — GABAPENTIN 300 MILLIGRAM(S): 400 CAPSULE ORAL at 17:29

## 2024-05-25 RX ADMIN — Medication 5 MILLIGRAM(S): at 06:15

## 2024-05-25 RX ADMIN — Medication 975 MILLIGRAM(S): at 00:41

## 2024-05-25 RX ADMIN — Medication 975 MILLIGRAM(S): at 17:29

## 2024-05-25 RX ADMIN — ENOXAPARIN SODIUM 40 MILLIGRAM(S): 100 INJECTION SUBCUTANEOUS at 06:15

## 2024-05-25 NOTE — PROGRESS NOTE ADULT - SUBJECTIVE AND OBJECTIVE BOX
Subjective: Patient seen at bedside, complaint of some hand and pelvic pain, no overnight events, denies n/v/cp/sob. Tolerating diet, louis in place       MEDICATIONS  (STANDING):  acetaminophen     Tablet .. 975 milliGRAM(s) Oral every 6 hours  enoxaparin Injectable 40 milliGRAM(s) SubCutaneous every 12 hours  gabapentin 300 milliGRAM(s) Oral every 8 hours  HYDROmorphone  Injectable 1 milliGRAM(s) IV Push once  lidocaine   4% Patch 2 Patch Transdermal daily  oxybutynin 5 milliGRAM(s) Oral four times a day  polyethylene glycol 3350 17 Gram(s) Oral daily  senna 2 Tablet(s) Oral at bedtime    MEDICATIONS  (PRN):  HYDROmorphone   Tablet 2 milliGRAM(s) Oral every 4 hours PRN Moderate Pain (4 - 6)  HYDROmorphone   Tablet 4 milliGRAM(s) Oral every 4 hours PRN Severe Pain (7 - 10)  lidocaine 2% Jelly 5 milliLiter(s) IntraUrethral three times a day PRN discomfort at meatus from catheter  melatonin 5 milliGRAM(s) Oral at bedtime PRN Sleep      Vital Signs Last 24 Hrs  T(C): 37.1 (24 May 2024 21:00), Max: 37.1 (24 May 2024 21:00)  T(F): 98.7 (24 May 2024 21:00), Max: 98.7 (24 May 2024 21:00)  HR: 76 (25 May 2024 01:24) (64 - 86)  BP: 137/71 (25 May 2024 01:24) (107/70 - 137/71)  BP(mean): --  RR: 20 (25 May 2024 01:24) (18 - 20)  SpO2: 96% (25 May 2024 01:24) (96% - 100%)    Parameters below as of 25 May 2024 01:24  Patient On (Oxygen Delivery Method): room air        Physical Exam:    Constitutional: NAD  HEENT: PERRL, EOMI  Respiratory: Respirations non-labored, no accessory muscle use  Gastrointestinal: Soft, non-tender, non-distended  EXT: LUE ace/split in place, RUE in splint  Neurological: A&O x 3
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
## 2024-05-25 NOTE — PROGRESS NOTE ADULT - NS ATTEND AMEND GEN_ALL_CORE FT
I have seen and examined this patient with the surgery team at bedside.  No acute events overnight.  Patient appears well this morning.  Denies pain in his extremities.  No other complaints at this time.  Will engage CM and SW for DCP.

## 2024-05-26 PROCEDURE — 99231 SBSQ HOSP IP/OBS SF/LOW 25: CPT

## 2024-05-26 PROCEDURE — 76870 US EXAM SCROTUM: CPT | Mod: 26

## 2024-05-26 RX ORDER — METHOCARBAMOL 500 MG/1
750 TABLET, FILM COATED ORAL EVERY 4 HOURS
Refills: 0 | Status: COMPLETED | OUTPATIENT
Start: 2024-05-26 | End: 2024-05-27

## 2024-05-26 RX ORDER — HYDROMORPHONE HYDROCHLORIDE 2 MG/ML
0.5 INJECTION INTRAMUSCULAR; INTRAVENOUS; SUBCUTANEOUS ONCE
Refills: 0 | Status: DISCONTINUED | OUTPATIENT
Start: 2024-05-26 | End: 2024-05-26

## 2024-05-26 RX ORDER — OXYBUTYNIN CHLORIDE 5 MG
5 TABLET ORAL ONCE
Refills: 0 | Status: COMPLETED | OUTPATIENT
Start: 2024-05-26 | End: 2024-05-26

## 2024-05-26 RX ORDER — KETOROLAC TROMETHAMINE 30 MG/ML
15 SYRINGE (ML) INJECTION ONCE
Refills: 0 | Status: DISCONTINUED | OUTPATIENT
Start: 2024-05-26 | End: 2024-05-26

## 2024-05-26 RX ADMIN — HYDROMORPHONE HYDROCHLORIDE 4 MILLIGRAM(S): 2 INJECTION INTRAMUSCULAR; INTRAVENOUS; SUBCUTANEOUS at 17:15

## 2024-05-26 RX ADMIN — SENNA PLUS 2 TABLET(S): 8.6 TABLET ORAL at 21:26

## 2024-05-26 RX ADMIN — Medication 975 MILLIGRAM(S): at 01:00

## 2024-05-26 RX ADMIN — Medication 15 MILLIGRAM(S): at 23:21

## 2024-05-26 RX ADMIN — Medication 5 MILLIGRAM(S): at 00:18

## 2024-05-26 RX ADMIN — Medication 975 MILLIGRAM(S): at 14:25

## 2024-05-26 RX ADMIN — Medication 5 MILLIGRAM(S): at 17:30

## 2024-05-26 RX ADMIN — Medication 975 MILLIGRAM(S): at 17:29

## 2024-05-26 RX ADMIN — HYDROMORPHONE HYDROCHLORIDE 4 MILLIGRAM(S): 2 INJECTION INTRAMUSCULAR; INTRAVENOUS; SUBCUTANEOUS at 22:00

## 2024-05-26 RX ADMIN — Medication 975 MILLIGRAM(S): at 00:18

## 2024-05-26 RX ADMIN — Medication 975 MILLIGRAM(S): at 05:16

## 2024-05-26 RX ADMIN — GABAPENTIN 300 MILLIGRAM(S): 400 CAPSULE ORAL at 14:27

## 2024-05-26 RX ADMIN — ENOXAPARIN SODIUM 40 MILLIGRAM(S): 100 INJECTION SUBCUTANEOUS at 05:16

## 2024-05-26 RX ADMIN — Medication 975 MILLIGRAM(S): at 11:54

## 2024-05-26 RX ADMIN — GABAPENTIN 300 MILLIGRAM(S): 400 CAPSULE ORAL at 21:27

## 2024-05-26 RX ADMIN — HYDROMORPHONE HYDROCHLORIDE 4 MILLIGRAM(S): 2 INJECTION INTRAMUSCULAR; INTRAVENOUS; SUBCUTANEOUS at 11:53

## 2024-05-26 RX ADMIN — Medication 5 MILLIGRAM(S): at 05:16

## 2024-05-26 RX ADMIN — Medication 5 MILLIGRAM(S): at 11:54

## 2024-05-26 RX ADMIN — HYDROMORPHONE HYDROCHLORIDE 1 MILLIGRAM(S): 2 INJECTION INTRAMUSCULAR; INTRAVENOUS; SUBCUTANEOUS at 00:00

## 2024-05-26 RX ADMIN — Medication 975 MILLIGRAM(S): at 06:00

## 2024-05-26 RX ADMIN — GABAPENTIN 300 MILLIGRAM(S): 400 CAPSULE ORAL at 05:16

## 2024-05-26 RX ADMIN — HYDROMORPHONE HYDROCHLORIDE 4 MILLIGRAM(S): 2 INJECTION INTRAMUSCULAR; INTRAVENOUS; SUBCUTANEOUS at 21:27

## 2024-05-26 RX ADMIN — Medication 5 MILLIGRAM(S): at 19:50

## 2024-05-26 RX ADMIN — ENOXAPARIN SODIUM 40 MILLIGRAM(S): 100 INJECTION SUBCUTANEOUS at 17:30

## 2024-05-26 RX ADMIN — HYDROMORPHONE HYDROCHLORIDE 0.5 MILLIGRAM(S): 2 INJECTION INTRAMUSCULAR; INTRAVENOUS; SUBCUTANEOUS at 23:21

## 2024-05-26 NOTE — PROGRESS NOTE ADULT - SUBJECTIVE AND OBJECTIVE BOX
Patient seen and eval at bedside. Patient states he has severe pain when he urinates and urine leaks from louis catheter. Patient also notes pain in pubic area on the right side with some swelling that started yesterday. Denies fever chills. Patient states he has not been walking but transferring from bed to chair only.    Vital Signs Last 24 Hrs  T(C): 36.9 (26 May 2024 16:37), Max: 37.2 (26 May 2024 12:50)  T(F): 98.4 (26 May 2024 16:37), Max: 99 (26 May 2024 12:50)  HR: 86 (26 May 2024 16:37) (55 - 86)  BP: 132/73 (26 May 2024 16:37) (106/60 - 162/81)  BP(mean): 92 (26 May 2024 16:37) (92 - 98)  RR: 18 (26 May 2024 16:37) (16 - 20)  SpO2: 95% (26 May 2024 16:37) (95% - 98%)    PE: NAD, alert awake  Anterior lower abdomen/Pubis - incision site healing well, steristrips intact, no drainage or bleeding or s/o infection. Small area of swelling just distal and to right of incision mildly erythematous and tender to touch. Otherwise incision is nontender and left side contralateral to swollen tender area is nontender without swelling or redness. Louis catheter intact. HF intact B/L able to SLR.     A/P: s/p pubic symphysis ORIF POD#12  ·	D/w Dr. Esqueda - seems unrelated to orthopedic surgery. Defer to primary team for further evaluation  ·	Continue care as per primary team  ·	WB status unchanged.   ·	Pain control  ·	DVT propx
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
## 2024-05-26 NOTE — PROGRESS NOTE ADULT - NS ATTEND AMEND GEN_ALL_CORE FT
Patient seen and examined on am rounds. No acute events, c/o LUE pain and some pain at pelvic fx incision site, no erthema or induration at this site, steri's in place. Will contact ortho to evaluate. dispo planning.

## 2024-05-27 PROCEDURE — 99231 SBSQ HOSP IP/OBS SF/LOW 25: CPT

## 2024-05-27 RX ORDER — CEFTRIAXONE 500 MG/1
1000 INJECTION, POWDER, FOR SOLUTION INTRAMUSCULAR; INTRAVENOUS EVERY 24 HOURS
Refills: 0 | Status: COMPLETED | OUTPATIENT
Start: 2024-05-28 | End: 2024-05-30

## 2024-05-27 RX ORDER — CEFTRIAXONE 500 MG/1
1000 INJECTION, POWDER, FOR SOLUTION INTRAMUSCULAR; INTRAVENOUS ONCE
Refills: 0 | Status: COMPLETED | OUTPATIENT
Start: 2024-05-27 | End: 2024-05-27

## 2024-05-27 RX ORDER — HYDROMORPHONE HYDROCHLORIDE 2 MG/ML
0.5 INJECTION INTRAMUSCULAR; INTRAVENOUS; SUBCUTANEOUS EVERY 8 HOURS
Refills: 0 | Status: DISCONTINUED | OUTPATIENT
Start: 2024-05-27 | End: 2024-05-29

## 2024-05-27 RX ORDER — CEFTRIAXONE 500 MG/1
INJECTION, POWDER, FOR SOLUTION INTRAMUSCULAR; INTRAVENOUS
Refills: 0 | Status: COMPLETED | OUTPATIENT
Start: 2024-05-27 | End: 2024-05-31

## 2024-05-27 RX ADMIN — Medication 975 MILLIGRAM(S): at 07:00

## 2024-05-27 RX ADMIN — Medication 975 MILLIGRAM(S): at 13:00

## 2024-05-27 RX ADMIN — HYDROMORPHONE HYDROCHLORIDE 4 MILLIGRAM(S): 2 INJECTION INTRAMUSCULAR; INTRAVENOUS; SUBCUTANEOUS at 06:09

## 2024-05-27 RX ADMIN — HYDROMORPHONE HYDROCHLORIDE 0.5 MILLIGRAM(S): 2 INJECTION INTRAMUSCULAR; INTRAVENOUS; SUBCUTANEOUS at 22:00

## 2024-05-27 RX ADMIN — GABAPENTIN 300 MILLIGRAM(S): 400 CAPSULE ORAL at 22:09

## 2024-05-27 RX ADMIN — Medication 975 MILLIGRAM(S): at 01:00

## 2024-05-27 RX ADMIN — METHOCARBAMOL 750 MILLIGRAM(S): 500 TABLET, FILM COATED ORAL at 17:36

## 2024-05-27 RX ADMIN — GABAPENTIN 300 MILLIGRAM(S): 400 CAPSULE ORAL at 06:09

## 2024-05-27 RX ADMIN — HYDROMORPHONE HYDROCHLORIDE 4 MILLIGRAM(S): 2 INJECTION INTRAMUSCULAR; INTRAVENOUS; SUBCUTANEOUS at 07:00

## 2024-05-27 RX ADMIN — Medication 975 MILLIGRAM(S): at 00:15

## 2024-05-27 RX ADMIN — METHOCARBAMOL 750 MILLIGRAM(S): 500 TABLET, FILM COATED ORAL at 13:00

## 2024-05-27 RX ADMIN — GABAPENTIN 300 MILLIGRAM(S): 400 CAPSULE ORAL at 13:03

## 2024-05-27 RX ADMIN — HYDROMORPHONE HYDROCHLORIDE 0.5 MILLIGRAM(S): 2 INJECTION INTRAMUSCULAR; INTRAVENOUS; SUBCUTANEOUS at 22:15

## 2024-05-27 RX ADMIN — HYDROMORPHONE HYDROCHLORIDE 0.5 MILLIGRAM(S): 2 INJECTION INTRAMUSCULAR; INTRAVENOUS; SUBCUTANEOUS at 00:00

## 2024-05-27 RX ADMIN — HYDROMORPHONE HYDROCHLORIDE 0.5 MILLIGRAM(S): 2 INJECTION INTRAMUSCULAR; INTRAVENOUS; SUBCUTANEOUS at 13:00

## 2024-05-27 RX ADMIN — CEFTRIAXONE 1000 MILLIGRAM(S): 500 INJECTION, POWDER, FOR SOLUTION INTRAMUSCULAR; INTRAVENOUS at 13:18

## 2024-05-27 RX ADMIN — Medication 15 MILLIGRAM(S): at 00:00

## 2024-05-27 RX ADMIN — ENOXAPARIN SODIUM 40 MILLIGRAM(S): 100 INJECTION SUBCUTANEOUS at 17:36

## 2024-05-27 RX ADMIN — Medication 975 MILLIGRAM(S): at 17:36

## 2024-05-27 RX ADMIN — METHOCARBAMOL 750 MILLIGRAM(S): 500 TABLET, FILM COATED ORAL at 13:03

## 2024-05-27 RX ADMIN — METHOCARBAMOL 750 MILLIGRAM(S): 500 TABLET, FILM COATED ORAL at 02:21

## 2024-05-27 RX ADMIN — METHOCARBAMOL 750 MILLIGRAM(S): 500 TABLET, FILM COATED ORAL at 22:09

## 2024-05-27 RX ADMIN — HYDROMORPHONE HYDROCHLORIDE 4 MILLIGRAM(S): 2 INJECTION INTRAMUSCULAR; INTRAVENOUS; SUBCUTANEOUS at 19:50

## 2024-05-27 RX ADMIN — Medication 975 MILLIGRAM(S): at 06:09

## 2024-05-27 RX ADMIN — METHOCARBAMOL 750 MILLIGRAM(S): 500 TABLET, FILM COATED ORAL at 06:09

## 2024-05-27 RX ADMIN — ENOXAPARIN SODIUM 40 MILLIGRAM(S): 100 INJECTION SUBCUTANEOUS at 06:09
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
## 2024-05-28 PROCEDURE — 99231 SBSQ HOSP IP/OBS SF/LOW 25: CPT

## 2024-05-28 RX ORDER — OXYBUTYNIN CHLORIDE 5 MG
10 TABLET ORAL ONCE
Refills: 0 | Status: COMPLETED | OUTPATIENT
Start: 2024-05-28 | End: 2024-05-28

## 2024-05-28 RX ADMIN — Medication 975 MILLIGRAM(S): at 23:49

## 2024-05-28 RX ADMIN — HYDROMORPHONE HYDROCHLORIDE 0.5 MILLIGRAM(S): 2 INJECTION INTRAMUSCULAR; INTRAVENOUS; SUBCUTANEOUS at 15:34

## 2024-05-28 RX ADMIN — Medication 975 MILLIGRAM(S): at 03:33

## 2024-05-28 RX ADMIN — HYDROMORPHONE HYDROCHLORIDE 0.5 MILLIGRAM(S): 2 INJECTION INTRAMUSCULAR; INTRAVENOUS; SUBCUTANEOUS at 23:43

## 2024-05-28 RX ADMIN — Medication 10 MILLIGRAM(S): at 02:30

## 2024-05-28 RX ADMIN — Medication 975 MILLIGRAM(S): at 11:26

## 2024-05-28 RX ADMIN — HYDROMORPHONE HYDROCHLORIDE 2 MILLIGRAM(S): 2 INJECTION INTRAMUSCULAR; INTRAVENOUS; SUBCUTANEOUS at 02:30

## 2024-05-28 RX ADMIN — Medication 975 MILLIGRAM(S): at 20:00

## 2024-05-28 RX ADMIN — Medication 975 MILLIGRAM(S): at 02:33

## 2024-05-28 RX ADMIN — HYDROMORPHONE HYDROCHLORIDE 2 MILLIGRAM(S): 2 INJECTION INTRAMUSCULAR; INTRAVENOUS; SUBCUTANEOUS at 03:45

## 2024-05-28 RX ADMIN — HYDROMORPHONE HYDROCHLORIDE 4 MILLIGRAM(S): 2 INJECTION INTRAMUSCULAR; INTRAVENOUS; SUBCUTANEOUS at 11:25

## 2024-05-28 RX ADMIN — GABAPENTIN 300 MILLIGRAM(S): 400 CAPSULE ORAL at 22:15

## 2024-05-28 RX ADMIN — HYDROMORPHONE HYDROCHLORIDE 0.5 MILLIGRAM(S): 2 INJECTION INTRAMUSCULAR; INTRAVENOUS; SUBCUTANEOUS at 06:25

## 2024-05-28 RX ADMIN — HYDROMORPHONE HYDROCHLORIDE 4 MILLIGRAM(S): 2 INJECTION INTRAMUSCULAR; INTRAVENOUS; SUBCUTANEOUS at 12:30

## 2024-05-28 RX ADMIN — CEFTRIAXONE 1000 MILLIGRAM(S): 500 INJECTION, POWDER, FOR SOLUTION INTRAMUSCULAR; INTRAVENOUS at 14:30

## 2024-05-28 RX ADMIN — GABAPENTIN 300 MILLIGRAM(S): 400 CAPSULE ORAL at 14:30

## 2024-05-28 RX ADMIN — GABAPENTIN 300 MILLIGRAM(S): 400 CAPSULE ORAL at 06:09

## 2024-05-28 RX ADMIN — HYDROMORPHONE HYDROCHLORIDE 2 MILLIGRAM(S): 2 INJECTION INTRAMUSCULAR; INTRAVENOUS; SUBCUTANEOUS at 23:15

## 2024-05-28 RX ADMIN — HYDROMORPHONE HYDROCHLORIDE 0.5 MILLIGRAM(S): 2 INJECTION INTRAMUSCULAR; INTRAVENOUS; SUBCUTANEOUS at 16:30

## 2024-05-28 RX ADMIN — HYDROMORPHONE HYDROCHLORIDE 0.5 MILLIGRAM(S): 2 INJECTION INTRAMUSCULAR; INTRAVENOUS; SUBCUTANEOUS at 06:09

## 2024-05-28 RX ADMIN — Medication 975 MILLIGRAM(S): at 18:55

## 2024-05-28 RX ADMIN — ENOXAPARIN SODIUM 40 MILLIGRAM(S): 100 INJECTION SUBCUTANEOUS at 06:09

## 2024-05-28 RX ADMIN — ENOXAPARIN SODIUM 40 MILLIGRAM(S): 100 INJECTION SUBCUTANEOUS at 18:55

## 2024-05-28 RX ADMIN — HYDROMORPHONE HYDROCHLORIDE 2 MILLIGRAM(S): 2 INJECTION INTRAMUSCULAR; INTRAVENOUS; SUBCUTANEOUS at 22:15

## 2024-05-28 RX ADMIN — Medication 975 MILLIGRAM(S): at 12:30

## 2024-05-28 NOTE — PROGRESS NOTE ADULT - SUBJECTIVE AND OBJECTIVE BOX
INTERVAL HPI/OVERNIGHT EVENTS: Patient still refusing to take oral pain medications intermittently.  Also c/o of bladder spasms - oxybutinin prn is assisting with this. Vitals remain stable. Started on abx yesterday with mild improvement in scrotal pain.         MEDICATIONS  (STANDING):  acetaminophen     Tablet .. 975 milliGRAM(s) Oral every 6 hours  cefTRIAXone Injectable.      cefTRIAXone Injectable. 1000 milliGRAM(s) IV Push every 24 hours  enoxaparin Injectable 40 milliGRAM(s) SubCutaneous every 12 hours  gabapentin 300 milliGRAM(s) Oral every 8 hours  lidocaine   4% Patch 2 Patch Transdermal daily  polyethylene glycol 3350 17 Gram(s) Oral daily  senna 2 Tablet(s) Oral at bedtime    MEDICATIONS  (PRN):  HYDROmorphone   Tablet 4 milliGRAM(s) Oral every 4 hours PRN Severe Pain (7 - 10)  HYDROmorphone   Tablet 2 milliGRAM(s) Oral every 4 hours PRN Moderate Pain (4 - 6)  HYDROmorphone  Injectable 0.5 milliGRAM(s) IV Push every 8 hours PRN breakthrough pain  lidocaine 2% Jelly 5 milliLiter(s) IntraUrethral three times a day PRN discomfort at meatus from catheter  melatonin 5 milliGRAM(s) Oral at bedtime PRN Sleep      Vital Signs Last 24 Hrs  T(C): 36.7 (28 May 2024 05:07), Max: 36.9 (28 May 2024 00:43)  T(F): 98.1 (28 May 2024 05:07), Max: 98.4 (28 May 2024 00:43)  HR: 65 (28 May 2024 05:07) (57 - 78)  BP: 126/76 (28 May 2024 05:07) (103/66 - 149/67)  BP(mean): 78 (27 May 2024 19:47) (78 - 78)  RR: 18 (28 May 2024 05:07) (16 - 18)  SpO2: 100% (28 May 2024 05:07) (96% - 100%)    Parameters below as of 28 May 2024 05:07  Patient On (Oxygen Delivery Method): room air        Physical Exam:  Cons: NAD, resting comfortably     Neurological:  No neuro deficits    Respiratory: Unlabored, no accessory muscle use    Cardiovascular: NSR    Gastrointestinal: Soft,  nttp, nd    : Nguyen in place, R scrotal pain and erythema improving     Extremities:  LUE ace, RUE splint    Skin: No rashes      I&O's Detail    26 May 2024 07:01  -  27 May 2024 07:00  --------------------------------------------------------  IN:  Total IN: 0 mL    OUT:    Indwelling Catheter - Urethral (mL): 800 mL  Total OUT: 800 mL    Total NET: -800 mL      27 May 2024 07:01  -  28 May 2024 06:42  --------------------------------------------------------  IN:    Oral Fluid: 900 mL  Total IN: 900 mL    OUT:    Indwelling Catheter - Urethral (mL): 2500 mL    Voided (mL): 500 mL  Total OUT: 3000 mL    Total NET: -2100 mL          LABS:                RADIOLOGY & ADDITIONAL STUDIES:
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
## 2024-05-28 NOTE — PROGRESS NOTE ADULT - NS ATTEND AMEND GEN_ALL_CORE FT
I have seen and examined this patient with the surgery team on morning rounds.  No acute events overnight.  Patient appears unchanged this morning.  Endorsing pain in his right testicle in his right inguinal canal.  Started on Rocephin for orchitis.  Denies nausea/vomiting.  Will consult urology for orchitis and for possible DC Nguyen.  Will follow up with CM for DCP.
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
## 2024-05-29 ENCOUNTER — TRANSCRIPTION ENCOUNTER (OUTPATIENT)
Age: 29
End: 2024-05-29

## 2024-05-29 PROCEDURE — 99231 SBSQ HOSP IP/OBS SF/LOW 25: CPT

## 2024-05-29 RX ADMIN — GABAPENTIN 300 MILLIGRAM(S): 400 CAPSULE ORAL at 05:49

## 2024-05-29 RX ADMIN — Medication 975 MILLIGRAM(S): at 06:48

## 2024-05-29 RX ADMIN — HYDROMORPHONE HYDROCHLORIDE 2 MILLIGRAM(S): 2 INJECTION INTRAMUSCULAR; INTRAVENOUS; SUBCUTANEOUS at 19:22

## 2024-05-29 RX ADMIN — HYDROMORPHONE HYDROCHLORIDE 2 MILLIGRAM(S): 2 INJECTION INTRAMUSCULAR; INTRAVENOUS; SUBCUTANEOUS at 22:36

## 2024-05-29 RX ADMIN — HYDROMORPHONE HYDROCHLORIDE 4 MILLIGRAM(S): 2 INJECTION INTRAMUSCULAR; INTRAVENOUS; SUBCUTANEOUS at 06:49

## 2024-05-29 RX ADMIN — HYDROMORPHONE HYDROCHLORIDE 0.5 MILLIGRAM(S): 2 INJECTION INTRAMUSCULAR; INTRAVENOUS; SUBCUTANEOUS at 13:26

## 2024-05-29 RX ADMIN — Medication 975 MILLIGRAM(S): at 00:49

## 2024-05-29 RX ADMIN — Medication 975 MILLIGRAM(S): at 14:19

## 2024-05-29 RX ADMIN — HYDROMORPHONE HYDROCHLORIDE 2 MILLIGRAM(S): 2 INJECTION INTRAMUSCULAR; INTRAVENOUS; SUBCUTANEOUS at 23:36

## 2024-05-29 RX ADMIN — Medication 975 MILLIGRAM(S): at 21:22

## 2024-05-29 RX ADMIN — HYDROMORPHONE HYDROCHLORIDE 0.5 MILLIGRAM(S): 2 INJECTION INTRAMUSCULAR; INTRAVENOUS; SUBCUTANEOUS at 00:00

## 2024-05-29 RX ADMIN — HYDROMORPHONE HYDROCHLORIDE 4 MILLIGRAM(S): 2 INJECTION INTRAMUSCULAR; INTRAVENOUS; SUBCUTANEOUS at 05:49

## 2024-05-29 RX ADMIN — GABAPENTIN 300 MILLIGRAM(S): 400 CAPSULE ORAL at 14:18

## 2024-05-29 RX ADMIN — Medication 975 MILLIGRAM(S): at 05:48

## 2024-05-29 RX ADMIN — HYDROMORPHONE HYDROCHLORIDE 0.5 MILLIGRAM(S): 2 INJECTION INTRAMUSCULAR; INTRAVENOUS; SUBCUTANEOUS at 09:57

## 2024-05-29 RX ADMIN — HYDROMORPHONE HYDROCHLORIDE 2 MILLIGRAM(S): 2 INJECTION INTRAMUSCULAR; INTRAVENOUS; SUBCUTANEOUS at 18:04

## 2024-05-29 RX ADMIN — Medication 975 MILLIGRAM(S): at 15:20

## 2024-05-29 RX ADMIN — Medication 975 MILLIGRAM(S): at 22:22

## 2024-05-29 RX ADMIN — CEFTRIAXONE 1000 MILLIGRAM(S): 500 INJECTION, POWDER, FOR SOLUTION INTRAMUSCULAR; INTRAVENOUS at 14:19

## 2024-05-29 RX ADMIN — ENOXAPARIN SODIUM 40 MILLIGRAM(S): 100 INJECTION SUBCUTANEOUS at 17:58

## 2024-05-29 RX ADMIN — GABAPENTIN 300 MILLIGRAM(S): 400 CAPSULE ORAL at 21:22

## 2024-05-29 RX ADMIN — ENOXAPARIN SODIUM 40 MILLIGRAM(S): 100 INJECTION SUBCUTANEOUS at 05:48

## 2024-05-29 NOTE — DISCHARGE NOTE NURSING/CASE MANAGEMENT/SOCIAL WORK - NSDCFUADDAPPT_GEN_ALL_CORE_FT
Appt. has been scheduled for 06/03 at 10:15am with Kitty Odom NP in [place]. Address: [address] [place] [address], [place] [address]. Get Directions. Phone: [phone].

## 2024-05-29 NOTE — DISCHARGE NOTE NURSING/CASE MANAGEMENT/SOCIAL WORK - NSCORESITESY/N_GEN_A_CORE_RD
[facility] EMERGENCY DEPARTMENT ENCOUNTER    Basic Information  Name: [place] Wilhelm Age: 72 year old Sex: male   MRN: [id number] Encounter: 2023, 4:15 PM  PCP: Marlene Castillo MD    Chief Complaint  Chief Complaint   Patient presents with   • Wound Check       History of Present Illness    [place] Wilhelm is a 72 year old male who presented to the ED today with a c/o infection right foot/toes.  He notes he has had an ulcer/infection right great toe.  He was admitted in early January for same and completed course of antibiotics.  He has been going to the wound care center.  He followed up with Dr. Butler today in the office, who called and noted the patient was being sent over for admission for IV antibiotics and further evaulation.  He has noted peripheral arterial disease and Dr. Butler noted he may need to do an angiogram during admission.  Patient has no further concerns.  Has some pain in the foot.  No fever noted.  He has no other concerns.    Offered , patient is comfortable with family interpreting for him.      History given by/obtained from independent historians including: family at bedside    Available triage/nursing notes were reviewed by me.    I have reviewed prior documentation/external notes/records includin23 discharge summary, 23 MRI foot, 23 CTA lower extremity.    Health Status  Allergies:  ALLERGIES:   Allergen Reactions   • Morphine Other (See Comments)     SOB and palpitations (noted during hosp on 10/28/2022)       Current Medications:  (Not in a hospital admission)      Past Medical History    Past Medical History:   Diagnosis Date   • Cerebral infarction (CMS/HCC)    • Coronary artery disease    • Diabetes mellitus (CMS/HCC)    • Essential (primary) hypertension    • Heartburn    • High cholesterol        Past Surgical History:   Procedure Laterality Date   • Cardiac surgery      CABG x 4   • Removal gallbladder     • Vascular surgery      stent in  the right leg, vessel grafting on the left leg       History reviewed. No pertinent family history.    Social History     Tobacco Use   • Smoking status: Former     Types: Cigarettes     Quit date: 2008     Years since quitting: 15.0   • Smokeless tobacco: Never   Vaping Use   • Vaping Use: never used   Substance Use Topics   • Alcohol use: Not Currently   • Drug use: Never       Review of Systems/Physical Exam    REVIEW OF SYSTEMS:    ROS reviewed, pertinent positives/negatives as documented in HPI.    PHYSICAL EXAM:   ED Triage Vitals [02/01/23 1354]   BP (!) 162/71   Heart Rate 86   Resp 19   Temp 98.4 °F (36.9 °C)   SpO2 97 %      General:  No acute distress, Awake, Alert, Well appearing.  Skin:  Warm, dry.  Head:  Normocephalic, atraumatic.  Eye:  Vision grossly normal.  Ears, nose, mouth and throat:  Normal appearing nose.  Cardiovascular:  Normal peripheral pulses bilaterally except as noted below.  Respiratory:  Respirations are non-labored  Musculoskeletal:  Superficial ulcer medial aspect right great toe and toe is tender.  Some redness to area.  Right dorsalis pedis pulse faint (RN obtained via doppler, per Dr. Butler, obtained via doppler in office as well).  Left dorsalis pedis pulse palpable.  Otherwise normal ROM, normal strength, no tenderness, no swelling, no deformity.  Neurological:  Alert and oriented to person, place, time, and situation, No focal neurological deficit observed, normal speech observed, 5/5 strength in all extremities.  Psych: Cooperative      Diagnostics    Laboratory Results:  Results for orders placed or performed during the hospital encounter of 02/01/23   Comprehensive Metabolic Panel   Result Value Ref Range    Fasting Status      Sodium 135 135 - 145 mmol/L    Potassium 4.0 3.4 - 5.1 mmol/L    Chloride 99 97 - 110 mmol/L    Carbon Dioxide 30 21 - 32 mmol/L    Anion Gap 10 7 - 19 mmol/L    Glucose 340 (H) 70 - 99 mg/dL    BUN 21 (H) 6 - 20 mg/dL    Creatinine 1.15 0.67 -  1.17 mg/dL    Glomerular Filtration Rate 68 >=60    BUN/ Creatinine Ratio 18 7 - 25    Calcium 8.9 8.4 - 10.2 mg/dL    Bilirubin, Total 0.3 0.2 - 1.0 mg/dL    GOT/AST 17 <=37 Units/L    GPT/ALT 27 <64 Units/L    Alkaline Phosphatase 141 (H) 45 - 117 Units/L    Albumin 3.6 3.6 - 5.1 g/dL    Protein, Total 7.4 6.4 - 8.2 g/dL    Globulin 3.8 2.0 - 4.0 g/dL    A/G Ratio 0.9 (L) 1.0 - 2.4   Magnesium   Result Value Ref Range    Magnesium 2.1 1.7 - 2.4 mg/dL   CBC with Automated Differential (performable only)   Result Value Ref Range    WBC 9.7 4.2 - 11.0 K/mcL    RBC 4.73 4.50 - 5.90 mil/mcL    HGB 13.8 13.0 - 17.0 g/dL    HCT 40.8 39.0 - 51.0 %    MCV 86.3 78.0 - 100.0 fl    MCH 29.2 26.0 - 34.0 pg    MCHC 33.8 32.0 - 36.5 g/dL    RDW-CV 13.1 11.0 - 15.0 %    RDW-SD 40.5 39.0 - 50.0 fL     140 - 450 K/mcL    NRBC 0 <=0 /100 WBC    Neutrophil, Percent 66 %    Lymphocytes, Percent 19 %    Mono, Percent 10 %    Eosinophils, Percent 4 %    Basophils, Percent 1 %    Immature Granulocytes 0 %    Absolute Neutrophils 6.4 1.8 - 7.7 K/mcL    Absolute Lymphocytes 1.9 1.0 - 4.0 K/mcL    Absolute Monocytes 1.0 (H) 0.3 - 0.9 K/mcL    Absolute Eosinophils  0.3 0.0 - 0.5 K/mcL    Absolute Basophils 0.1 0.0 - 0.3 K/mcL    Absolute Immmature Granulocytes 0.0 0.0 - 0.2 K/mcL   Lactic Acid Venous With Reflex   Result Value Ref Range    Lactate, Venous 1.6 0.0 - 2.0 mmol/L       Radiology Results:  XR FOOT MIN 3 VIEWS RIGHT   Final Result   1. Degenerative changes and evidence of old trauma without evidence of   acute osseous injury or obvious bony destruction..      Electronically Signed by: DOM PFEIFFER M.D.    Signed on: 2/1/2023 3:05 PM              Medications Ordered/Given  Medications   cefepime (MAXIPIME) 2,000 mg in sodium chloride 0.9 % 100 mL IVPB (has no administration in time range)   metroNIDAZOLE (FLAGYL) premix IVPB 500 mg (has no administration in time range)   vancomycin (VANCOCIN) 1,000 mg in sodium chloride  0.9 % 250 mL IVPB (has no administration in time range)       ED Course  Vitals:    02/01/23 1354 02/01/23 1413 02/01/23 1416 02/01/23 1421   BP: (!) 162/71   (!) 177/71   BP Location: RUE - Right upper extremity   RUE - Right upper extremity   Patient Position: Sitting/High-Diaz's   Sitting/High-Diaz's   Pulse: 86   83   Resp: 19   18   Temp: 98.4 °F (36.9 °C)  97.7 °F (36.5 °C)    TempSrc: Oral  Oral    SpO2: 97%   98%   Weight:  69.2 kg (152 lb 8.9 oz)     Height:  5' 2\" (1.575 m)         ED Course as of 02/01/23 1625 Wed Feb 01, 2023 1615 I spoke with ED pharmacist, rec cefepime/vanc/flagyl. []   1621 I spoke with Dr. Curtis, accepts admit.    Perfectserve sent to Dr. Butler to update, will consult. []      ED Course User Index  [JH] Eddie Gutierrez, DO       MEDICAL DECISION MAKING  Multiple differential diagnoses were considered. The patient / caregivers were apprised of diagnostic / treatment options including alternate modes of care, in addition to the risks and benefits, for this evaluation. Based on this discussion the patient / caregiver agrees with this chosen diagnostic and treatment plan.    [place] Wilhelm as noted presented for evaluation and admit for right toe infection/ulcer.  Has been chronic issue.  Admitted recently for same.  Has known PAD.  No fever.  Labs reassuring.  No obvious sign of osteomyelitis.  Started on IV abx.  Pt will be admitted for further treatment and likely angiogram from cardiology to further assess his PAD.  No indication at this time for emergent repeat CTA leg.  No ischemic changes to leg/foot.  Pt/family have no other concerns.      Multiple differential diagnoses were considered including: cellulitis, PAD, diabetic ulcer, osteomyelitis    Comorbidities and chronic medical conditions that impacted care included but were not limited to: DM, HTN, PAD    Laboratory results as above were independently reviewed and interpreted by myself.    Radiology results as  above were independently reviewed by myself.    Further testing/treatment that was considered but not indicated to be performed emergently at this time in the emergency room included: repeat CTA lower extremity      Impression and Plan  Diagnosis:  1. Ulcer of toe of right foot, unspecified ulcer stage (CMS/HCC)    2. Peripheral arterial disease (CMS/HCC)    3. Failure of outpatient treatment        Condition:  STABLE    Disposition:  Admit 2/1/2023  4:24 PM  Telemetry Bed?: No  Admitting Physician: DINORA KELLY [[id number]]  Is this a telephone or verbal order?: This is a telephone order from the admitting physician       Eddie Gutierrez DO  02/01/23 2029     No

## 2024-05-29 NOTE — PROGRESS NOTE ADULT - GENITOURINARY COMMENTS
mild discomfort over right testis in inguinal canal, no erythema, minimal scrotal swelling, incision healing well
louis in place, mild tenderness over right testis in inguinal canal, no erythema, resolving edema of scrotum
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
## 2024-05-29 NOTE — PROGRESS NOTE ADULT - SUBJECTIVE AND OBJECTIVE BOX
INTERVAL HPI/OVERNIGHT EVENTS: Patient refusing to take oral pain medications at night. Nguyen removed yesterday. The patient passed his trial of void. He denies pain upon urination.      MEDICATIONS  (STANDING):  acetaminophen     Tablet .. 975 milliGRAM(s) Oral every 6 hours  cefTRIAXone Injectable.      cefTRIAXone Injectable. 1000 milliGRAM(s) IV Push every 24 hours  enoxaparin Injectable 40 milliGRAM(s) SubCutaneous every 12 hours  gabapentin 300 milliGRAM(s) Oral every 8 hours  lidocaine   4% Patch 2 Patch Transdermal daily  polyethylene glycol 3350 17 Gram(s) Oral daily  senna 2 Tablet(s) Oral at bedtime    MEDICATIONS  (PRN):  HYDROmorphone   Tablet 2 milliGRAM(s) Oral every 4 hours PRN Moderate Pain (4 - 6)  HYDROmorphone   Tablet 4 milliGRAM(s) Oral every 4 hours PRN Severe Pain (7 - 10)  HYDROmorphone  Injectable 0.5 milliGRAM(s) IV Push every 8 hours PRN breakthrough pain  lidocaine 2% Jelly 5 milliLiter(s) IntraUrethral three times a day PRN discomfort at meatus from catheter  melatonin 5 milliGRAM(s) Oral at bedtime PRN Sleep      Vital Signs Last 24 Hrs  T(C): 36.7 (29 May 2024 04:15), Max: 37 (28 May 2024 16:40)  T(F): 98 (29 May 2024 04:15), Max: 98.6 (28 May 2024 16:40)  HR: 70 (29 May 2024 04:15) (53 - 78)  BP: 112/70 (29 May 2024 04:15) (109/62 - 143/88)  BP(mean): --  RR: 18 (29 May 2024 04:15) (18 - 19)  SpO2: 96% (29 May 2024 04:15) (96% - 99%)    Parameters below as of 29 May 2024 04:15  Patient On (Oxygen Delivery Method): room air        Physical Exam:    Respiratory:  no accessory muscle use    Cardiovascular: Regular rate    Gastrointestinal: Soft, non-tender, no rebound tenderness or guarding, right groin tenderness with palpable testicle      I&O's Detail    27 May 2024 07:01  -  28 May 2024 07:00  --------------------------------------------------------  IN:    Oral Fluid: 900 mL  Total IN: 900 mL    OUT:    Indwelling Catheter - Urethral (mL): 2500 mL    Voided (mL): 500 mL  Total OUT: 3000 mL    Total NET: -2100 mL      28 May 2024 07:01  -  29 May 2024 06:55  --------------------------------------------------------  IN:  Total IN: 0 mL    OUT:    Voided (mL): 750 mL  Total OUT: 750 mL    Total NET: -750 mL     INTERVAL HPI/OVERNIGHT EVENTS: Patient refusing to take oral pain medications at night. Nguyen removed yesterday. The patient passed his trial of void. He denies pain upon urination.      MEDICATIONS  (STANDING):  acetaminophen     Tablet .. 975 milliGRAM(s) Oral every 6 hours  cefTRIAXone Injectable.      cefTRIAXone Injectable. 1000 milliGRAM(s) IV Push every 24 hours  enoxaparin Injectable 40 milliGRAM(s) SubCutaneous every 12 hours  gabapentin 300 milliGRAM(s) Oral every 8 hours  lidocaine   4% Patch 2 Patch Transdermal daily  polyethylene glycol 3350 17 Gram(s) Oral daily  senna 2 Tablet(s) Oral at bedtime    MEDICATIONS  (PRN):  HYDROmorphone   Tablet 2 milliGRAM(s) Oral every 4 hours PRN Moderate Pain (4 - 6)  HYDROmorphone   Tablet 4 milliGRAM(s) Oral every 4 hours PRN Severe Pain (7 - 10)  HYDROmorphone  Injectable 0.5 milliGRAM(s) IV Push every 8 hours PRN breakthrough pain  lidocaine 2% Jelly 5 milliLiter(s) IntraUrethral three times a day PRN discomfort at meatus from catheter  melatonin 5 milliGRAM(s) Oral at bedtime PRN Sleep      Vital Signs Last 24 Hrs  T(C): 36.7 (29 May 2024 04:15), Max: 37 (28 May 2024 16:40)  T(F): 98 (29 May 2024 04:15), Max: 98.6 (28 May 2024 16:40)  HR: 70 (29 May 2024 04:15) (53 - 78)  BP: 112/70 (29 May 2024 04:15) (109/62 - 143/88)  BP(mean): --  RR: 18 (29 May 2024 04:15) (18 - 19)  SpO2: 96% (29 May 2024 04:15) (96% - 99%)    Parameters below as of 29 May 2024 04:15  Patient On (Oxygen Delivery Method): room air        Physical Exam:    Respiratory:  no accessory muscle use    Cardiovascular: Regular rate    Gastrointestinal: Soft, non-tender, no rebound tenderness or guarding, right groin tenderness with palpable testicle    Extremities:  LUE ace, RUE splint    I&O's Detail    27 May 2024 07:01  -  28 May 2024 07:00  --------------------------------------------------------  IN:    Oral Fluid: 900 mL  Total IN: 900 mL    OUT:    Indwelling Catheter - Urethral (mL): 2500 mL    Voided (mL): 500 mL  Total OUT: 3000 mL    Total NET: -2100 mL      28 May 2024 07:01  -  29 May 2024 06:55  --------------------------------------------------------  IN:  Total IN: 0 mL    OUT:    Voided (mL): 750 mL  Total OUT: 750 mL    Total NET: -750 mL     INTERVAL HPI/OVERNIGHT EVENTS: Patient refusing to take oral pain medications at night. Nguyen removed yesterday. The patient passed his trial of void. He denies pain upon urination.      MEDICATIONS  (STANDING):  acetaminophen     Tablet .. 975 milliGRAM(s) Oral every 6 hours  cefTRIAXone Injectable.      cefTRIAXone Injectable. 1000 milliGRAM(s) IV Push every 24 hours  enoxaparin Injectable 40 milliGRAM(s) SubCutaneous every 12 hours  gabapentin 300 milliGRAM(s) Oral every 8 hours  lidocaine   4% Patch 2 Patch Transdermal daily  polyethylene glycol 3350 17 Gram(s) Oral daily  senna 2 Tablet(s) Oral at bedtime    MEDICATIONS  (PRN):  HYDROmorphone   Tablet 2 milliGRAM(s) Oral every 4 hours PRN Moderate Pain (4 - 6)  HYDROmorphone   Tablet 4 milliGRAM(s) Oral every 4 hours PRN Severe Pain (7 - 10)  HYDROmorphone  Injectable 0.5 milliGRAM(s) IV Push every 8 hours PRN breakthrough pain  lidocaine 2% Jelly 5 milliLiter(s) IntraUrethral three times a day PRN discomfort at meatus from catheter  melatonin 5 milliGRAM(s) Oral at bedtime PRN Sleep      Vital Signs Last 24 Hrs  T(C): 36.7 (29 May 2024 04:15), Max: 37 (28 May 2024 16:40)  T(F): 98 (29 May 2024 04:15), Max: 98.6 (28 May 2024 16:40)  HR: 70 (29 May 2024 04:15) (53 - 78)  BP: 112/70 (29 May 2024 04:15) (109/62 - 143/88)  BP(mean): --  RR: 18 (29 May 2024 04:15) (18 - 19)  SpO2: 96% (29 May 2024 04:15) (96% - 99%)    Parameters below as of 29 May 2024 04:15  Patient On (Oxygen Delivery Method): room air        Physical Exam:    Respiratory:  no accessory muscle use    Cardiovascular: Regular rate    Gastrointestinal: Soft, non-tender, no rebound tenderness or guarding    Extremities:  LUE ace, RUE splint    : right groin tenderness with palpable testicle in inguinal canal    I&O's Detail    27 May 2024 07:01  -  28 May 2024 07:00  --------------------------------------------------------  IN:    Oral Fluid: 900 mL  Total IN: 900 mL    OUT:    Indwelling Catheter - Urethral (mL): 2500 mL    Voided (mL): 500 mL  Total OUT: 3000 mL    Total NET: -2100 mL      28 May 2024 07:01  -  29 May 2024 06:55  --------------------------------------------------------  IN:  Total IN: 0 mL    OUT:    Voided (mL): 750 mL  Total OUT: 750 mL    Total NET: -750 mL

## 2024-05-29 NOTE — DISCHARGE NOTE NURSING/CASE MANAGEMENT/SOCIAL WORK - PATIENT PORTAL LINK FT
You can access the FollowMyHealth Patient Portal offered by [facility] by registering at the following website: [email or web address]. By joining [facility]s FollowMyHealth portal, you will also be able to view your health information using other applications (apps) compatible with our system.
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
## 2024-05-29 NOTE — DISCHARGE NOTE NURSING/CASE MANAGEMENT/SOCIAL WORK - NSDCVIVACCINE_GEN_ALL_CORE_FT
Tdap; 07-May-2024 02:28; Tricia Shaw (RN); [employer]; [id number] (Exp. Date: 25-Jul-2025); IntraMuscular; Deltoid Left.; 0.5 milliLiter(s); VIS (VIS Published: 09-May-2013, VIS Presented: 07-May-2024);

## 2024-05-29 NOTE — PROGRESS NOTE ADULT - NS ATTEND AMEND GEN_ALL_CORE FT
Patient was seen and examined at bedside with the surgery team on morning rounds.  No acute events overnight.  Patient appears well this morning.  Pain in testicle improving.  NTD per urology, will recommend follow-up.  Denies nausea/vomiting.  No pain elsewhere.  On Rocephin for Orchitis, will finish today.  Nguyen DC and TOV successful.  Will plan for DC home today.

## 2024-05-30 VITALS
HEART RATE: 65 BPM | OXYGEN SATURATION: 98 % | DIASTOLIC BLOOD PRESSURE: 64 MMHG | TEMPERATURE: 98 F | RESPIRATION RATE: 16 BRPM | SYSTOLIC BLOOD PRESSURE: 108 MMHG

## 2024-05-30 PROCEDURE — 99231 SBSQ HOSP IP/OBS SF/LOW 25: CPT

## 2024-05-30 RX ORDER — ACETAMINOPHEN 500 MG
3 TABLET ORAL
Qty: 60 | Refills: 0
Start: 2024-05-30 | End: 2024-06-03

## 2024-05-30 RX ORDER — GABAPENTIN 400 MG/1
3 CAPSULE ORAL
Qty: 45 | Refills: 0
Start: 2024-05-30 | End: 2024-06-03

## 2024-05-30 RX ORDER — LIDOCAINE 4 G/100G
1 CREAM TOPICAL
Qty: 2 | Refills: 0
Start: 2024-05-30 | End: 2024-06-05

## 2024-05-30 RX ORDER — HYDROMORPHONE HYDROCHLORIDE 2 MG/ML
1 INJECTION INTRAMUSCULAR; INTRAVENOUS; SUBCUTANEOUS
Qty: 9 | Refills: 0
Start: 2024-05-30 | End: 2024-06-01

## 2024-05-30 RX ORDER — ENOXAPARIN SODIUM 100 MG/ML
40 INJECTION SUBCUTANEOUS
Qty: 84 | Refills: 0
Start: 2024-05-30 | End: 2024-07-10

## 2024-05-30 RX ADMIN — Medication 975 MILLIGRAM(S): at 02:13

## 2024-05-30 RX ADMIN — HYDROMORPHONE HYDROCHLORIDE 4 MILLIGRAM(S): 2 INJECTION INTRAMUSCULAR; INTRAVENOUS; SUBCUTANEOUS at 05:46

## 2024-05-30 RX ADMIN — Medication 975 MILLIGRAM(S): at 12:51

## 2024-05-30 RX ADMIN — HYDROMORPHONE HYDROCHLORIDE 4 MILLIGRAM(S): 2 INJECTION INTRAMUSCULAR; INTRAVENOUS; SUBCUTANEOUS at 04:46

## 2024-05-30 RX ADMIN — Medication 975 MILLIGRAM(S): at 12:54

## 2024-05-30 RX ADMIN — GABAPENTIN 300 MILLIGRAM(S): 400 CAPSULE ORAL at 04:46

## 2024-05-30 RX ADMIN — ENOXAPARIN SODIUM 40 MILLIGRAM(S): 100 INJECTION SUBCUTANEOUS at 04:46

## 2024-05-30 RX ADMIN — Medication 975 MILLIGRAM(S): at 03:13

## 2024-05-30 RX ADMIN — GABAPENTIN 300 MILLIGRAM(S): 400 CAPSULE ORAL at 12:51

## 2024-05-30 NOTE — PROGRESS NOTE ADULT - SUBJECTIVE AND OBJECTIVE BOX
SURGERY        INTERVAL EVENTS/SUBJECTIVE:   No acute events overnight.  Pt resting comfortably this morning on rounds.  Pain currently well controlled on PO hydromorphone.  Did not require IV medication overnight.    ______________________________________________  OBJECTIVE:   T(C): 36.6 (05-30-24 @ 04:40), Max: 37 (05-29-24 @ 21:20)  HR: 61 (05-30-24 @ 04:40) (61 - 81)  BP: 114/74 (05-30-24 @ 04:40) (114/74 - 128/73)  RR: 17 (05-30-24 @ 04:40) (16 - 19)  SpO2: 99% (05-30-24 @ 04:40) (96% - 99%)  Wt(kg): --  CAPILLARY BLOOD GLUCOSE        I&O's Detail    28 May 2024 07:01  -  29 May 2024 07:00  --------------------------------------------------------  IN:  Total IN: 0 mL    OUT:    Voided (mL): 750 mL  Total OUT: 750 mL    Total NET: -750 mL      29 May 2024 07:01  -  30 May 2024 06:49  --------------------------------------------------------  IN:    Oral Fluid: 720 mL  Total IN: 720 mL    OUT:    Voided (mL): 500 mL  Total OUT: 500 mL    Total NET: 220 mL          Physical exam:  General: resting comfortably in NAD  Respiratory:  no accessory muscle use  Cardiovascular: Regular rate  Gastrointestinal: Soft, non-tender, no rebound tenderness or guarding  Extremities:  LUE ace, RUE splint  : right groin tenderness with palpable testicle in inguinal canal

## 2024-05-30 NOTE — PROGRESS NOTE ADULT - NS ATTEND OPT1 GEN_ALL_CORE

## 2024-05-30 NOTE — PROGRESS NOTE ADULT - NS ATTEND AMEND GEN_ALL_CORE FT
Patient was seen and examined at bedside with the surgery team on morning rounds.  No acute events overnight.  Patient appears well this morning.  Pain in testicle improving.  NTD per urology, will recommend follow-up.  Denies nausea/vomiting.  No pain elsewhere.  Voiding appropriately.  Will plan for DC home today.
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
## 2024-05-30 NOTE — PROGRESS NOTE ADULT - SUBJECTIVE AND OBJECTIVE BOX
Patient seen at bedside for dressing change to JOSE GARCIA: well padded splint in place, splint/dressing removed-pin sites c/d/i, no erythema, drainage or discharge. xeroform, gauze(soft dressing placed), webril, volar splint and ace wrap placed. SILT, compartments compressible, radial pulse intact, bcr. f/u with Dr. Mccauley in 2 weeks.

## 2024-06-05 PROBLEM — Z00.00 ENCOUNTER FOR PREVENTIVE HEALTH EXAMINATION: Status: ACTIVE | Noted: 2024-06-05

## 2024-06-11 ENCOUNTER — APPOINTMENT (OUTPATIENT)
Dept: UROLOGY | Facility: CLINIC | Age: 29
End: 2024-06-11
Payer: SELF-PAY

## 2024-06-11 ENCOUNTER — APPOINTMENT (OUTPATIENT)
Dept: TRAUMA SURGERY | Facility: CLINIC | Age: 29
End: 2024-06-11

## 2024-06-11 DIAGNOSIS — Q53.9 UNDESCENDED TESTICLE, UNSPECIFIED: ICD-10-CM

## 2024-06-11 PROCEDURE — 99203 OFFICE O/P NEW LOW 30 MIN: CPT

## 2024-06-11 NOTE — PHYSICAL EXAM
[General Appearance - Well Developed] : well developed [General Appearance - Well Nourished] : well nourished [Normal Appearance] : normal appearance [Well Groomed] : well groomed [Edema] : no peripheral edema [] : no respiratory distress [Abdomen Soft] : soft [Urethral Meatus] : meatus normal [Penis Abnormality] : normal uncircumcised penis [Normal Station and Gait] : the gait and station were normal for the patient's age [Skin Color & Pigmentation] : normal skin color and pigmentation [No Focal Deficits] : no focal deficits [Oriented To Time, Place, And Person] : oriented to person, place, and time [Affect] : the affect was normal [Mood] : the mood was normal [Not Anxious] : not anxious [de-identified] : Well-healed lower abdominal incision [de-identified] : s/p L orchiectomy. Scrotal incision c/d/i. Right testicle palpable in inguinal canal
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
## 2024-06-12 ENCOUNTER — APPOINTMENT (OUTPATIENT)
Dept: ORTHOPEDIC SURGERY | Facility: CLINIC | Age: 29
End: 2024-06-12

## 2024-06-12 DIAGNOSIS — S39.93XD: ICD-10-CM

## 2024-06-12 PROCEDURE — 99024 POSTOP FOLLOW-UP VISIT: CPT

## 2024-06-12 PROCEDURE — 72190 X-RAY EXAM OF PELVIS: CPT

## 2024-06-12 RX ORDER — GABAPENTIN 300 MG/1
300 TABLET ORAL
Qty: 30 | Refills: 2 | Status: ACTIVE | COMMUNITY
Start: 2024-06-12 | End: 1900-01-01

## 2024-06-12 RX ORDER — TRAMADOL HYDROCHLORIDE 50 MG/1
50 TABLET, COATED ORAL
Qty: 60 | Refills: 0 | Status: ACTIVE | COMMUNITY
Start: 2024-06-12 | End: 1900-01-01
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
## 2024-06-14 ENCOUNTER — APPOINTMENT (OUTPATIENT)
Dept: UROLOGY | Facility: CLINIC | Age: 29
End: 2024-06-14

## 2024-08-22 PROCEDURE — 73560 X-RAY EXAM OF KNEE 1 OR 2: CPT

## 2024-08-22 PROCEDURE — 86965 POOLING BLOOD PLATELETS: CPT

## 2024-08-22 PROCEDURE — 75894 X-RAYS TRANSCATH THERAPY: CPT

## 2024-08-22 PROCEDURE — 76870 US EXAM SCROTUM: CPT

## 2024-08-22 PROCEDURE — 76000 FLUOROSCOPY <1 HR PHYS/QHP: CPT

## 2024-08-22 PROCEDURE — 76942 ECHO GUIDE FOR BIOPSY: CPT

## 2024-08-22 PROCEDURE — C1713: CPT

## 2024-08-22 PROCEDURE — 72125 CT NECK SPINE W/O DYE: CPT | Mod: MC

## 2024-08-22 PROCEDURE — 82962 GLUCOSE BLOOD TEST: CPT

## 2024-08-22 PROCEDURE — 71045 X-RAY EXAM CHEST 1 VIEW: CPT

## 2024-08-22 PROCEDURE — 80048 BASIC METABOLIC PNL TOTAL CA: CPT

## 2024-08-22 PROCEDURE — 72170 X-RAY EXAM OF PELVIS: CPT

## 2024-08-22 PROCEDURE — C9399: CPT

## 2024-08-22 PROCEDURE — 81001 URINALYSIS AUTO W/SCOPE: CPT

## 2024-08-22 PROCEDURE — C1760: CPT

## 2024-08-22 PROCEDURE — 85730 THROMBOPLASTIN TIME PARTIAL: CPT

## 2024-08-22 PROCEDURE — 72192 CT PELVIS W/O DYE: CPT | Mod: MC

## 2024-08-22 PROCEDURE — 83605 ASSAY OF LACTIC ACID: CPT

## 2024-08-22 PROCEDURE — 73080 X-RAY EXAM OF ELBOW: CPT

## 2024-08-22 PROCEDURE — 90715 TDAP VACCINE 7 YRS/> IM: CPT

## 2024-08-22 PROCEDURE — 87641 MR-STAPH DNA AMP PROBE: CPT

## 2024-08-22 PROCEDURE — 70498 CT ANGIOGRAPHY NECK: CPT | Mod: MC

## 2024-08-22 PROCEDURE — 99285 EMERGENCY DEPT VISIT HI MDM: CPT | Mod: 25

## 2024-08-22 PROCEDURE — C1769: CPT

## 2024-08-22 PROCEDURE — 86923 COMPATIBILITY TEST ELECTRIC: CPT

## 2024-08-22 PROCEDURE — 85384 FIBRINOGEN ACTIVITY: CPT

## 2024-08-22 PROCEDURE — P9016: CPT

## 2024-08-22 PROCEDURE — 97163 PT EVAL HIGH COMPLEX 45 MIN: CPT

## 2024-08-22 PROCEDURE — 80053 COMPREHEN METABOLIC PANEL: CPT

## 2024-08-22 PROCEDURE — 70496 CT ANGIOGRAPHY HEAD: CPT | Mod: MC

## 2024-08-22 PROCEDURE — 73030 X-RAY EXAM OF SHOULDER: CPT

## 2024-08-22 PROCEDURE — 84156 ASSAY OF PROTEIN URINE: CPT

## 2024-08-22 PROCEDURE — 83735 ASSAY OF MAGNESIUM: CPT

## 2024-08-22 PROCEDURE — 73070 X-RAY EXAM OF ELBOW: CPT

## 2024-08-22 PROCEDURE — 97110 THERAPEUTIC EXERCISES: CPT

## 2024-08-22 PROCEDURE — 97542 WHEELCHAIR MNGMENT TRAINING: CPT

## 2024-08-22 PROCEDURE — 85027 COMPLETE CBC AUTOMATED: CPT

## 2024-08-22 PROCEDURE — 85025 COMPLETE CBC W/AUTO DIFF WBC: CPT

## 2024-08-22 PROCEDURE — 84300 ASSAY OF URINE SODIUM: CPT

## 2024-08-22 PROCEDURE — 75736 ARTERY X-RAYS PELVIS: CPT

## 2024-08-22 PROCEDURE — 82803 BLOOD GASES ANY COMBINATION: CPT

## 2024-08-22 PROCEDURE — 36415 COLL VENOUS BLD VENIPUNCTURE: CPT

## 2024-08-22 PROCEDURE — 88305 TISSUE EXAM BY PATHOLOGIST: CPT

## 2024-08-22 PROCEDURE — 82550 ASSAY OF CK (CPK): CPT

## 2024-08-22 PROCEDURE — 85610 PROTHROMBIN TIME: CPT

## 2024-08-22 PROCEDURE — 83935 ASSAY OF URINE OSMOLALITY: CPT

## 2024-08-22 PROCEDURE — 73600 X-RAY EXAM OF ANKLE: CPT

## 2024-08-22 PROCEDURE — 86850 RBC ANTIBODY SCREEN: CPT

## 2024-08-22 PROCEDURE — 73090 X-RAY EXAM OF FOREARM: CPT

## 2024-08-22 PROCEDURE — 72131 CT LUMBAR SPINE W/O DYE: CPT | Mod: MC

## 2024-08-22 PROCEDURE — 82553 CREATINE MB FRACTION: CPT

## 2024-08-22 PROCEDURE — 70450 CT HEAD/BRAIN W/O DYE: CPT | Mod: MC

## 2024-08-22 PROCEDURE — 86901 BLOOD TYPING SEROLOGIC RH(D): CPT

## 2024-08-22 PROCEDURE — P9012: CPT

## 2024-08-22 PROCEDURE — 36430 TRANSFUSION BLD/BLD COMPNT: CPT

## 2024-08-22 PROCEDURE — 86900 BLOOD TYPING SEROLOGIC ABO: CPT

## 2024-08-22 PROCEDURE — 83930 ASSAY OF BLOOD OSMOLALITY: CPT

## 2024-08-22 PROCEDURE — 96374 THER/PROPH/DIAG INJ IV PUSH: CPT

## 2024-08-22 PROCEDURE — 80307 DRUG TEST PRSMV CHEM ANLYZR: CPT

## 2024-08-22 PROCEDURE — 82570 ASSAY OF URINE CREATININE: CPT

## 2024-08-22 PROCEDURE — 73200 CT UPPER EXTREMITY W/O DYE: CPT | Mod: MC

## 2024-08-22 PROCEDURE — 93005 ELECTROCARDIOGRAM TRACING: CPT

## 2024-08-22 PROCEDURE — C1894: CPT

## 2024-08-22 PROCEDURE — 80076 HEPATIC FUNCTION PANEL: CPT

## 2024-08-22 PROCEDURE — C1889: CPT

## 2024-08-22 PROCEDURE — 84100 ASSAY OF PHOSPHORUS: CPT

## 2024-08-22 PROCEDURE — 97530 THERAPEUTIC ACTIVITIES: CPT

## 2024-08-22 PROCEDURE — 83690 ASSAY OF LIPASE: CPT

## 2024-08-22 PROCEDURE — 97535 SELF CARE MNGMENT TRAINING: CPT

## 2024-08-22 PROCEDURE — 87640 STAPH A DNA AMP PROBE: CPT

## 2024-08-22 PROCEDURE — 73130 X-RAY EXAM OF HAND: CPT

## 2024-08-22 PROCEDURE — 72128 CT CHEST SPINE W/O DYE: CPT | Mod: MC

## 2025-02-26 NOTE — PROGRESS NOTE ADULT - PROVIDER SPECIALTY LIST ADULT
What Type Of Note Output Would You Prefer (Optional)?: Standard Output What Is The Reason For Today's Visit?: Annual Full Body Skin Examination with No Concerns Neurosurgery

## (undated) DEVICE — NDL HYPO REGULAR BEVEL 25G X 1.5" (BLUE)

## (undated) DEVICE — SUT MONOCRYL 3-0 27" PS-2 UNDYED

## (undated) DEVICE — SUT MONOCRYL 4-0 27" PS-2 UNDYED

## (undated) DEVICE — SUT VICRYL 2-0 27" CT-2 UNDYED

## (undated) DEVICE — DRSG COBAN 6"

## (undated) DEVICE — FOLEY TRAY 16FR 5CC LF UMETER CLOSED

## (undated) DEVICE — NDL HYPO SAFE 25G X 1" (ORANGE)

## (undated) DEVICE — WARMING BLANKET UPPER ADULT

## (undated) DEVICE — PACK EXTREMITY

## (undated) DEVICE — DRAPE 3/4 SHEET 52X76"

## (undated) DEVICE — GLV 7.5 PROTEXIS (WHITE)

## (undated) DEVICE — FRAZIER SUCTION TIP 10FR

## (undated) DEVICE — ELCTR BOVIE PENCIL SMOKE EVACUATION 15FT

## (undated) DEVICE — VENODYNE/SCD SLEEVE CALF MEDIUM

## (undated) DEVICE — SOL IRR POUR NS 0.9% 1000ML

## (undated) DEVICE — GLV 8 PROTEXIS (BLUE)

## (undated) DEVICE — DRAPE MAYO STAND 23"

## (undated) DEVICE — DRAPE XL SHEET 77X98"

## (undated) DEVICE — DRSG ACE BANDAGE 4"

## (undated) DEVICE — DRSG STOCKINETTE TUBULAR COTTON 2PLY 4X60"

## (undated) DEVICE — DRAPE SPLIT SHEET 77" X 108"

## (undated) DEVICE — DRAPE C ARM UNIVERSAL

## (undated) DEVICE — SUT VICRYL 0 27" CT-2 UNDYED

## (undated) DEVICE — DRAPE IOBAN 33" X 23"

## (undated) DEVICE — DRAPE C ARM MINI

## (undated) DEVICE — DRAPE HAND 77" X 146"

## (undated) DEVICE — GLV 8 PROTEXIS (WHITE)

## (undated) DEVICE — SOL IRR POUR H2O 1000ML

## (undated) DEVICE — SUT VICRYL PLUS 0 27" CT-2 UNDYED

## (undated) DEVICE — DRILL BIT SYNTHES ORTHO 2.5MM 3 FLUTED CALIB

## (undated) DEVICE — TOURNIQUET ESMARK 4"

## (undated) DEVICE — SUT MONOCRYL 2-0 27" SH UNDYED

## (undated) DEVICE — DRAPE C ARM C-ARMOUR

## (undated) DEVICE — STAPLER SKIN PROXIMATE

## (undated) DEVICE — DRAPE TOWEL BLUE 17" X 24"

## (undated) DEVICE — YELLOW PIN COVER